# Patient Record
Sex: FEMALE | Race: WHITE | Employment: PART TIME | URBAN - METROPOLITAN AREA
[De-identification: names, ages, dates, MRNs, and addresses within clinical notes are randomized per-mention and may not be internally consistent; named-entity substitution may affect disease eponyms.]

---

## 2018-03-11 ENCOUNTER — HOSPITAL ENCOUNTER (EMERGENCY)
Age: 43
Discharge: HOME | End: 2018-03-11
Attending: EMERGENCY MEDICINE
Payer: MEDICAID

## 2018-03-11 VITALS
WEIGHT: 120 LBS | DIASTOLIC BLOOD PRESSURE: 62 MMHG | TEMPERATURE: 98.4 F | RESPIRATION RATE: 16 BRPM | OXYGEN SATURATION: 100 % | HEART RATE: 78 BPM | SYSTOLIC BLOOD PRESSURE: 120 MMHG | HEIGHT: 66 IN | BODY MASS INDEX: 19.29 KG/M2

## 2018-03-11 DIAGNOSIS — R10.2 CHRONIC PELVIC PAIN IN FEMALE: Primary | ICD-10-CM

## 2018-03-11 DIAGNOSIS — G89.29 CHRONIC PELVIC PAIN IN FEMALE: Primary | ICD-10-CM

## 2018-03-11 LAB
BACTERIA: ABNORMAL /HPF
BILIRUBIN URINE: NEGATIVE
BLOOD, URINE: NORMAL
CHP ED QC CHECK: NORMAL
CLARITY: NORMAL
COLOR: YELLOW
EPITHELIAL CELLS, UA: ABNORMAL /HPF
GLUCOSE URINE: NEGATIVE MG/DL
KETONES, URINE: NEGATIVE MG/DL
LEUKOCYTE ESTERASE, URINE: NEGATIVE
NITRITE, URINE: NEGATIVE
PH UA: 5 (ref 5–9)
PREGNANCY TEST URINE, POC: NEGATIVE
PROTEIN UA: NEGATIVE MG/DL
RBC UA: ABNORMAL /HPF (ref 0–2)
SPECIFIC GRAVITY UA: >=1.03 (ref 1–1.03)
UROBILINOGEN, URINE: 0.2 E.U./DL
WBC UA: ABNORMAL /HPF (ref 0–5)

## 2018-03-11 PROCEDURE — 96372 THER/PROPH/DIAG INJ SC/IM: CPT

## 2018-03-11 PROCEDURE — 81003 URINALYSIS AUTO W/O SCOPE: CPT

## 2018-03-11 PROCEDURE — 99283 EMERGENCY DEPT VISIT LOW MDM: CPT

## 2018-03-11 PROCEDURE — 81015 MICROSCOPIC EXAM OF URINE: CPT

## 2018-03-11 PROCEDURE — 6360000002 HC RX W HCPCS: Performed by: NURSE PRACTITIONER

## 2018-03-11 RX ORDER — KETOROLAC TROMETHAMINE 30 MG/ML
30 INJECTION, SOLUTION INTRAMUSCULAR; INTRAVENOUS ONCE
Status: COMPLETED | OUTPATIENT
Start: 2018-03-11 | End: 2018-03-11

## 2018-03-11 RX ORDER — NAPROXEN 500 MG/1
500 TABLET ORAL 2 TIMES DAILY
Qty: 14 TABLET | Refills: 0 | Status: SHIPPED | OUTPATIENT
Start: 2018-03-11 | End: 2019-06-24 | Stop reason: ALTCHOICE

## 2018-03-11 RX ORDER — NAPROXEN 250 MG/1
250 TABLET ORAL 2 TIMES DAILY PRN
COMMUNITY
End: 2018-03-11

## 2018-03-11 RX ADMIN — KETOROLAC TROMETHAMINE 30 MG: 30 INJECTION, SOLUTION INTRAMUSCULAR at 12:41

## 2018-03-11 ASSESSMENT — PAIN DESCRIPTION - ONSET: ONSET: ON-GOING

## 2018-03-11 ASSESSMENT — PAIN DESCRIPTION - FREQUENCY: FREQUENCY: CONTINUOUS

## 2018-03-11 ASSESSMENT — PAIN DESCRIPTION - LOCATION: LOCATION: PELVIS;BACK

## 2018-03-11 ASSESSMENT — PAIN SCALES - GENERAL
PAINLEVEL_OUTOF10: 9
PAINLEVEL_OUTOF10: 5
PAINLEVEL_OUTOF10: 9

## 2018-03-11 ASSESSMENT — PAIN DESCRIPTION - DESCRIPTORS: DESCRIPTORS: ACHING;THROBBING

## 2018-03-11 ASSESSMENT — PAIN DESCRIPTION - PROGRESSION: CLINICAL_PROGRESSION: GRADUALLY WORSENING

## 2018-03-11 ASSESSMENT — PAIN DESCRIPTION - PAIN TYPE: TYPE: ACUTE PAIN

## 2018-03-11 ASSESSMENT — PAIN DESCRIPTION - ORIENTATION: ORIENTATION: LOWER

## 2018-03-11 NOTE — ED PROVIDER NOTES
Procedure Laterality Date    BREAST SURGERY  2004    lumpectomy of the L    Social History:  reports that she has been smoking Cigarettes. She has a 4.00 pack-year smoking history. She has never used smokeless tobacco. She reports that she does not drink alcohol or use drugs. Family History: family history is not on file. Allergies: Patient has no known allergies. Physical Exam           ED Triage Vitals [03/11/18 1120]   BP Temp Temp Source Pulse Resp SpO2 Height Weight   121/62 98.4 °F (36.9 °C) Oral 78 16 100 % 5' 6\" (1.676 m) 120 lb (54.4 kg)      Oxygen Saturation Interpretation: Normal.    · General Appearance/Constitutional:  Alert, development consistent with age. · HEENT:  NC/NT. PERRLA. Airway patent. · Neck:  Supple. No lymphadenopathy. · Respiratory:  No retractions. Lungs Clear to auscultation and breath sounds equal.  · CV:  Regular rate and rhythm. · GI:  General Appearance: normal.         Bowel sounds: normal bowel sounds. Distension:  None. Tenderness: Mild bilateral pelvic tenderness. Liver: non-tender. Spleen:  non-palpable and non-tender. Pulsatile Mass: absent. Hernia:  no inguinal or femoral hernias noted. · Back: CVA Tenderness: No.  · : (chaperone present during examination). External Genitalia: General appearance; normal, Hair distribution; normal, Lesions absent. Urethral Meatus: Size normal, Location normal, Lesions absent, Prolapse absent. bilaterally\"}. Anus/Perineum:  normal.  · Integument:  Normal turgor. Warm, dry, without visible rash, unless noted elsewhere. · Lymphatics: No edema, cap.refill <3sec. · Neurological:  Orientation age-appropriate. Motor functions intact.     Lab / Imaging Results   (All laboratory and radiology results have been personally reviewed by myself)  Labs:  Results for orders placed or performed during the hospital encounter of home  Patient condition is good    New Medications     Discharge Medication List as of 3/11/2018  1:29 PM        Electronically signed by Sarah Galeana NP   DD: 3/11/18  **This report was transcribed using voice recognition software. Every effort was made to ensure accuracy; however, inadvertent computerized transcription errors may be present. END OF ED PROVIDER NOTE     Bharath Edmond NP  03/11/18 8251  ATTENDING PROVIDER ATTESTATION:     Sina Hunter presented to the emergency department for evaluation of Abdominal Pain (Pt recently diagnosed with endometriosis by OB; pt has constant pelvic and back pain that has recently changed and worsened since Friday; pt is concerned something may have ruptured; pt claims to have ovarian cysts and fibroid tumor) and Nausea    I have reviewed and discussed the case, including pertinent history (medical, surgical, family and social) and exam findings with the Midlevel and the Nurse assigned to Sina Hunter. I have personally performed and/or participated in the history, exam, medical decision making, and procedures and agree with all pertinent clinical information. I have reviewed my findings and recommendations with Sina Hunter and members of family present at the time of disposition. My findings/plan: The encounter diagnosis was Chronic pelvic pain in female.   Discharge Medication List as of 3/11/2018  1:58 PM        Joni López DO    Critical Care:  no       Joni López DO  03/11/18 1628

## 2018-03-21 ENCOUNTER — HOSPITAL ENCOUNTER (OUTPATIENT)
Age: 43
Discharge: HOME | End: 2018-03-23
Payer: MEDICAID

## 2018-03-21 ENCOUNTER — HOSPITAL ENCOUNTER (OUTPATIENT)
Dept: MAMMOGRAPHY | Age: 43
Discharge: HOME | End: 2018-03-23
Payer: MEDICAID

## 2018-03-21 DIAGNOSIS — Z12.31 VISIT FOR SCREENING MAMMOGRAM: ICD-10-CM

## 2018-03-21 PROCEDURE — 77067 SCR MAMMO BI INCL CAD: CPT

## 2018-03-21 PROCEDURE — 88305 TISSUE EXAM BY PATHOLOGIST: CPT

## 2018-05-16 ENCOUNTER — HOSPITAL ENCOUNTER (OUTPATIENT)
Age: 43
Discharge: HOME | End: 2018-05-18

## 2018-05-16 ENCOUNTER — HOSPITAL ENCOUNTER (OUTPATIENT)
Age: 43
Discharge: HOME | End: 2018-05-18
Payer: COMMERCIAL

## 2018-05-16 LAB
ANION GAP SERPL CALCULATED.3IONS-SCNC: 11 MMOL/L (ref 7–16)
BUN BLDV-MCNC: 12 MG/DL (ref 6–20)
CALCIUM SERPL-MCNC: 8.9 MG/DL (ref 8.6–10.2)
CHLORIDE BLD-SCNC: 103 MMOL/L (ref 98–107)
CO2: 26 MMOL/L (ref 22–29)
CREAT SERPL-MCNC: 0.5 MG/DL (ref 0.5–1)
GFR AFRICAN AMERICAN: >60
GFR NON-AFRICAN AMERICAN: >60 ML/MIN/1.73
GLUCOSE BLD-MCNC: 85 MG/DL (ref 74–109)
HCT VFR BLD CALC: 36.6 % (ref 34–48)
HEMOGLOBIN: 12 G/DL (ref 11.5–15.5)
MCH RBC QN AUTO: 31 PG (ref 26–35)
MCHC RBC AUTO-ENTMCNC: 32.8 % (ref 32–34.5)
MCV RBC AUTO: 94.6 FL (ref 80–99.9)
PDW BLD-RTO: 13.1 FL (ref 11.5–15)
PLATELET # BLD: 260 E9/L (ref 130–450)
PMV BLD AUTO: 9.9 FL (ref 7–12)
POTASSIUM SERPL-SCNC: 4 MMOL/L (ref 3.5–5)
RBC # BLD: 3.87 E12/L (ref 3.5–5.5)
SODIUM BLD-SCNC: 140 MMOL/L (ref 132–146)
WBC # BLD: 5 E9/L (ref 4.5–11.5)

## 2018-05-16 PROCEDURE — 85027 COMPLETE CBC AUTOMATED: CPT

## 2018-05-16 PROCEDURE — 87081 CULTURE SCREEN ONLY: CPT

## 2018-05-16 PROCEDURE — 80048 BASIC METABOLIC PNL TOTAL CA: CPT

## 2018-05-18 LAB — MRSA CULTURE ONLY: NORMAL

## 2018-05-29 ENCOUNTER — HOSPITAL ENCOUNTER (OUTPATIENT)
Age: 43
Discharge: HOME | End: 2018-05-31
Payer: COMMERCIAL

## 2018-05-29 ENCOUNTER — HOSPITAL ENCOUNTER (OUTPATIENT)
Age: 43
Discharge: HOME | End: 2018-05-31

## 2018-05-29 LAB
ABO/RH: NORMAL
ANTIBODY SCREEN: NORMAL

## 2018-05-29 PROCEDURE — 86900 BLOOD TYPING SEROLOGIC ABO: CPT

## 2018-05-29 PROCEDURE — 86850 RBC ANTIBODY SCREEN: CPT

## 2018-05-29 PROCEDURE — 88307 TISSUE EXAM BY PATHOLOGIST: CPT

## 2018-05-29 PROCEDURE — 86901 BLOOD TYPING SEROLOGIC RH(D): CPT

## 2018-05-30 ENCOUNTER — HOSPITAL ENCOUNTER (INPATIENT)
Age: 43
LOS: 1 days | Discharge: HOME | DRG: 254 | End: 2018-06-01
Attending: EMERGENCY MEDICINE | Admitting: SPECIALIST
Payer: COMMERCIAL

## 2018-05-30 ENCOUNTER — APPOINTMENT (OUTPATIENT)
Dept: CT IMAGING | Age: 43
DRG: 254 | End: 2018-05-30
Payer: COMMERCIAL

## 2018-05-30 ENCOUNTER — HOSPITAL ENCOUNTER (OUTPATIENT)
Age: 43
Discharge: HOME | End: 2018-06-01

## 2018-05-30 DIAGNOSIS — G89.18 POST-OPERATIVE PAIN: Primary | ICD-10-CM

## 2018-05-30 LAB
ALBUMIN SERPL-MCNC: 3.3 G/DL (ref 3.5–5.2)
ALP BLD-CCNC: 72 U/L (ref 35–104)
ALT SERPL-CCNC: 14 U/L (ref 0–32)
ANION GAP SERPL CALCULATED.3IONS-SCNC: 11 MMOL/L (ref 7–16)
AST SERPL-CCNC: 16 U/L (ref 0–31)
BACTERIA: NORMAL /HPF
BASOPHILS ABSOLUTE: 0.01 E9/L (ref 0–0.2)
BASOPHILS RELATIVE PERCENT: 0.1 % (ref 0–2)
BILIRUB SERPL-MCNC: 0.3 MG/DL (ref 0–1.2)
BILIRUBIN URINE: NEGATIVE
BLOOD, URINE: NORMAL
BUN BLDV-MCNC: 7 MG/DL (ref 6–20)
CALCIUM SERPL-MCNC: 8.5 MG/DL (ref 8.6–10.2)
CHLORIDE BLD-SCNC: 101 MMOL/L (ref 98–107)
CHP ED QC CHECK: NORMAL
CLARITY: CLEAR
CO2: 26 MMOL/L (ref 22–29)
COLOR: YELLOW
CREAT SERPL-MCNC: 0.5 MG/DL (ref 0.5–1)
EKG ATRIAL RATE: 84 BPM
EKG P AXIS: 78 DEGREES
EKG P-R INTERVAL: 122 MS
EKG Q-T INTERVAL: 374 MS
EKG QRS DURATION: 72 MS
EKG QTC CALCULATION (BAZETT): 441 MS
EKG R AXIS: 68 DEGREES
EKG T AXIS: 37 DEGREES
EKG VENTRICULAR RATE: 84 BPM
EOSINOPHILS ABSOLUTE: 0.05 E9/L (ref 0.05–0.5)
EOSINOPHILS RELATIVE PERCENT: 0.4 % (ref 0–6)
EPITHELIAL CELLS, UA: NORMAL /HPF
GFR AFRICAN AMERICAN: >60
GFR NON-AFRICAN AMERICAN: >60 ML/MIN/1.73
GLUCOSE BLD-MCNC: 122 MG/DL (ref 74–109)
GLUCOSE URINE: NEGATIVE MG/DL
HCT VFR BLD CALC: 29.4 % (ref 34–48)
HCT VFR BLD CALC: 29.8 % (ref 34–48)
HEMOGLOBIN: 9.8 G/DL (ref 11.5–15.5)
HEMOGLOBIN: 9.9 G/DL (ref 11.5–15.5)
IMMATURE GRANULOCYTES #: 0.04 E9/L
IMMATURE GRANULOCYTES %: 0.3 % (ref 0–5)
INR BLD: 2
KETONES, URINE: NEGATIVE MG/DL
LACTIC ACID: 2 MMOL/L (ref 0.5–2.2)
LEUKOCYTE ESTERASE, URINE: NEGATIVE
LYMPHOCYTES ABSOLUTE: 1.07 E9/L (ref 1.5–4)
LYMPHOCYTES RELATIVE PERCENT: 8.6 % (ref 20–42)
MCH RBC QN AUTO: 31.6 PG (ref 26–35)
MCHC RBC AUTO-ENTMCNC: 32.9 % (ref 32–34.5)
MCV RBC AUTO: 96.1 FL (ref 80–99.9)
MONOCYTES ABSOLUTE: 0.75 E9/L (ref 0.1–0.95)
MONOCYTES RELATIVE PERCENT: 6 % (ref 2–12)
NEUTROPHILS ABSOLUTE: 10.5 E9/L (ref 1.8–7.3)
NEUTROPHILS RELATIVE PERCENT: 84.6 % (ref 43–80)
NITRITE, URINE: NEGATIVE
PDW BLD-RTO: 13.2 FL (ref 11.5–15)
PH UA: 5.5 (ref 5–9)
PLATELET # BLD: 254 E9/L (ref 130–450)
PMV BLD AUTO: 10.3 FL (ref 7–12)
POTASSIUM REFLEX MAGNESIUM: 3.9 MMOL/L (ref 3.5–5)
PREGNANCY TEST URINE, POC: NEGATIVE
PROTEIN UA: NEGATIVE MG/DL
PROTHROMBIN TIME: 22.4 SEC (ref 9.3–12.4)
RBC # BLD: 3.1 E12/L (ref 3.5–5.5)
RBC UA: NORMAL /HPF (ref 0–2)
SODIUM BLD-SCNC: 138 MMOL/L (ref 132–146)
SPECIFIC GRAVITY UA: 1.02 (ref 1–1.03)
TOTAL PROTEIN: 5.7 G/DL (ref 6.4–8.3)
UROBILINOGEN, URINE: 0.2 E.U./DL
WBC # BLD: 12.4 E9/L (ref 4.5–11.5)
WBC UA: NORMAL /HPF (ref 0–5)

## 2018-05-30 PROCEDURE — 83605 ASSAY OF LACTIC ACID: CPT

## 2018-05-30 PROCEDURE — 36415 COLL VENOUS BLD VENIPUNCTURE: CPT

## 2018-05-30 PROCEDURE — 2580000003 HC RX 258: Performed by: EMERGENCY MEDICINE

## 2018-05-30 PROCEDURE — 74177 CT ABD & PELVIS W/CONTRAST: CPT

## 2018-05-30 PROCEDURE — 87088 URINE BACTERIA CULTURE: CPT

## 2018-05-30 PROCEDURE — 85730 THROMBOPLASTIN TIME PARTIAL: CPT

## 2018-05-30 PROCEDURE — 85018 HEMOGLOBIN: CPT

## 2018-05-30 PROCEDURE — 99285 EMERGENCY DEPT VISIT HI MDM: CPT

## 2018-05-30 PROCEDURE — 81001 URINALYSIS AUTO W/SCOPE: CPT

## 2018-05-30 PROCEDURE — 80053 COMPREHEN METABOLIC PANEL: CPT

## 2018-05-30 PROCEDURE — 93005 ELECTROCARDIOGRAM TRACING: CPT | Performed by: EMERGENCY MEDICINE

## 2018-05-30 PROCEDURE — 85610 PROTHROMBIN TIME: CPT

## 2018-05-30 PROCEDURE — 85025 COMPLETE CBC W/AUTO DIFF WBC: CPT

## 2018-05-30 PROCEDURE — 85014 HEMATOCRIT: CPT

## 2018-05-30 PROCEDURE — 6360000004 HC RX CONTRAST MEDICATION: Performed by: RADIOLOGY

## 2018-05-30 RX ORDER — 0.9 % SODIUM CHLORIDE 0.9 %
1000 INTRAVENOUS SOLUTION INTRAVENOUS ONCE
Status: COMPLETED | OUTPATIENT
Start: 2018-05-30 | End: 2018-05-30

## 2018-05-30 RX ADMIN — IOPAMIDOL 110 ML: 755 INJECTION, SOLUTION INTRAVENOUS at 23:50

## 2018-05-30 RX ADMIN — SODIUM CHLORIDE 1000 ML: 9 INJECTION, SOLUTION INTRAVENOUS at 22:35

## 2018-05-30 ASSESSMENT — PAIN SCALES - GENERAL: PAINLEVEL_OUTOF10: 8

## 2018-05-31 PROBLEM — I95.9 HYPOTENSION: Status: ACTIVE | Noted: 2018-05-31

## 2018-05-31 LAB
APTT: 25.2 SEC (ref 24.5–35.1)
APTT: >240 SEC (ref 24.5–35.1)
INR BLD: 1.1
PROTHROMBIN TIME: 12.9 SEC (ref 9.3–12.4)

## 2018-05-31 PROCEDURE — 85730 THROMBOPLASTIN TIME PARTIAL: CPT

## 2018-05-31 PROCEDURE — 96375 TX/PRO/DX INJ NEW DRUG ADDON: CPT

## 2018-05-31 PROCEDURE — 96374 THER/PROPH/DIAG INJ IV PUSH: CPT

## 2018-05-31 PROCEDURE — 6370000000 HC RX 637 (ALT 250 FOR IP): Performed by: SPECIALIST

## 2018-05-31 PROCEDURE — 85610 PROTHROMBIN TIME: CPT

## 2018-05-31 PROCEDURE — 6360000002 HC RX W HCPCS: Performed by: EMERGENCY MEDICINE

## 2018-05-31 PROCEDURE — 2580000003 HC RX 258: Performed by: EMERGENCY MEDICINE

## 2018-05-31 PROCEDURE — 36415 COLL VENOUS BLD VENIPUNCTURE: CPT

## 2018-05-31 PROCEDURE — 6370000000 HC RX 637 (ALT 250 FOR IP)

## 2018-05-31 PROCEDURE — 2060000000 HC ICU INTERMEDIATE R&B

## 2018-05-31 PROCEDURE — 2580000003 HC RX 258: Performed by: OBSTETRICS & GYNECOLOGY

## 2018-05-31 PROCEDURE — 6360000002 HC RX W HCPCS: Performed by: OBSTETRICS & GYNECOLOGY

## 2018-05-31 PROCEDURE — G0378 HOSPITAL OBSERVATION PER HR: HCPCS

## 2018-05-31 PROCEDURE — 6370000000 HC RX 637 (ALT 250 FOR IP): Performed by: SURGERY

## 2018-05-31 RX ORDER — FENTANYL CITRATE 50 UG/ML
50 INJECTION, SOLUTION INTRAMUSCULAR; INTRAVENOUS ONCE
Status: COMPLETED | OUTPATIENT
Start: 2018-05-31 | End: 2018-05-31

## 2018-05-31 RX ORDER — SODIUM PHOSPHATE, DIBASIC AND SODIUM PHOSPHATE, MONOBASIC 7; 19 G/133ML; G/133ML
1 ENEMA RECTAL
Status: DISCONTINUED | OUTPATIENT
Start: 2018-05-31 | End: 2018-05-31

## 2018-05-31 RX ORDER — 0.9 % SODIUM CHLORIDE 0.9 %
1000 INTRAVENOUS SOLUTION INTRAVENOUS ONCE
Status: COMPLETED | OUTPATIENT
Start: 2018-05-31 | End: 2018-05-31

## 2018-05-31 RX ORDER — DIMETHICONE, OXYBENZONE, AND PADIMATE O 2; 2.5; 6.6 G/100G; G/100G; G/100G
STICK TOPICAL PRN
Status: DISCONTINUED | OUTPATIENT
Start: 2018-05-31 | End: 2018-06-01 | Stop reason: HOSPADM

## 2018-05-31 RX ORDER — DEXTROSE, SODIUM CHLORIDE, SODIUM LACTATE, POTASSIUM CHLORIDE, AND CALCIUM CHLORIDE 5; .6; .31; .03; .02 G/100ML; G/100ML; G/100ML; G/100ML; G/100ML
INJECTION, SOLUTION INTRAVENOUS CONTINUOUS
Status: DISCONTINUED | OUTPATIENT
Start: 2018-05-31 | End: 2018-06-01

## 2018-05-31 RX ORDER — SODIUM CHLORIDE 0.9 % (FLUSH) 0.9 %
10 SYRINGE (ML) INJECTION PRN
Status: DISCONTINUED | OUTPATIENT
Start: 2018-05-31 | End: 2018-06-01 | Stop reason: HOSPADM

## 2018-05-31 RX ORDER — SODIUM CHLORIDE 0.9 % (FLUSH) 0.9 %
10 SYRINGE (ML) INJECTION EVERY 12 HOURS SCHEDULED
Status: DISCONTINUED | OUTPATIENT
Start: 2018-05-31 | End: 2018-06-01 | Stop reason: HOSPADM

## 2018-05-31 RX ORDER — SODIUM PHOSPHATE, DIBASIC AND SODIUM PHOSPHATE, MONOBASIC 7; 19 G/133ML; G/133ML
1 ENEMA RECTAL
Status: COMPLETED | OUTPATIENT
Start: 2018-05-31 | End: 2018-05-31

## 2018-05-31 RX ORDER — ONDANSETRON 2 MG/ML
4 INJECTION INTRAMUSCULAR; INTRAVENOUS EVERY 6 HOURS PRN
Status: DISCONTINUED | OUTPATIENT
Start: 2018-05-31 | End: 2018-06-01 | Stop reason: HOSPADM

## 2018-05-31 RX ORDER — ACETAMINOPHEN 325 MG/1
650 TABLET ORAL EVERY 4 HOURS PRN
Status: DISCONTINUED | OUTPATIENT
Start: 2018-05-31 | End: 2018-06-01 | Stop reason: HOSPADM

## 2018-05-31 RX ORDER — DIMETHICONE, OXYBENZONE, AND PADIMATE O 2; 2.5; 6.6 G/100G; G/100G; G/100G
STICK TOPICAL
Status: COMPLETED
Start: 2018-05-31 | End: 2018-05-31

## 2018-05-31 RX ADMIN — HYDROMORPHONE HYDROCHLORIDE 0.5 MG: 1 INJECTION, SOLUTION INTRAMUSCULAR; INTRAVENOUS; SUBCUTANEOUS at 08:08

## 2018-05-31 RX ADMIN — POLYETHYLENE GLYCOL-3350 AND ELECTROLYTES 4000 ML: 236; 6.74; 5.86; 2.97; 22.74 POWDER, FOR SOLUTION ORAL at 20:05

## 2018-05-31 RX ADMIN — Medication: at 08:15

## 2018-05-31 RX ADMIN — SODIUM CHLORIDE, SODIUM LACTATE, POTASSIUM CHLORIDE, CALCIUM CHLORIDE AND DEXTROSE MONOHYDRATE: 5; 600; 310; 30; 20 INJECTION, SOLUTION INTRAVENOUS at 20:08

## 2018-05-31 RX ADMIN — SODIUM CHLORIDE, SODIUM LACTATE, POTASSIUM CHLORIDE, CALCIUM CHLORIDE AND DEXTROSE MONOHYDRATE: 5; 600; 310; 30; 20 INJECTION, SOLUTION INTRAVENOUS at 06:13

## 2018-05-31 RX ADMIN — MAGESIUM CITRATE 296 ML: 1.75 LIQUID ORAL at 12:36

## 2018-05-31 RX ADMIN — ACETAMINOPHEN 650 MG: 325 TABLET ORAL at 12:35

## 2018-05-31 RX ADMIN — SODIUM CHLORIDE, SODIUM LACTATE, POTASSIUM CHLORIDE, CALCIUM CHLORIDE AND DEXTROSE MONOHYDRATE: 5; 600; 310; 30; 20 INJECTION, SOLUTION INTRAVENOUS at 13:57

## 2018-05-31 RX ADMIN — ONDANSETRON 4 MG: 2 INJECTION INTRAMUSCULAR; INTRAVENOUS at 08:09

## 2018-05-31 RX ADMIN — SODIUM CHLORIDE 1000 ML: 9 INJECTION, SOLUTION INTRAVENOUS at 00:28

## 2018-05-31 RX ADMIN — SODIUM PHOSPHATE 1 ENEMA: 7; 19 ENEMA RECTAL at 17:01

## 2018-05-31 RX ADMIN — ONDANSETRON 4 MG: 2 INJECTION INTRAMUSCULAR; INTRAVENOUS at 00:28

## 2018-05-31 RX ADMIN — MAGESIUM CITRATE 296 ML: 1.75 LIQUID ORAL at 17:02

## 2018-05-31 RX ADMIN — ACETAMINOPHEN 650 MG: 325 TABLET ORAL at 20:15

## 2018-05-31 RX ADMIN — FENTANYL CITRATE 50 MCG: 50 INJECTION, SOLUTION INTRAMUSCULAR; INTRAVENOUS at 00:28

## 2018-05-31 ASSESSMENT — PAIN DESCRIPTION - ORIENTATION
ORIENTATION: MID

## 2018-05-31 ASSESSMENT — PAIN DESCRIPTION - LOCATION
LOCATION: HEAD
LOCATION: ABDOMEN;HEAD

## 2018-05-31 ASSESSMENT — PAIN SCALES - GENERAL
PAINLEVEL_OUTOF10: 9
PAINLEVEL_OUTOF10: 8
PAINLEVEL_OUTOF10: 9
PAINLEVEL_OUTOF10: 5
PAINLEVEL_OUTOF10: 9
PAINLEVEL_OUTOF10: 7
PAINLEVEL_OUTOF10: 6
PAINLEVEL_OUTOF10: 9
PAINLEVEL_OUTOF10: 0

## 2018-05-31 ASSESSMENT — PAIN DESCRIPTION - FREQUENCY
FREQUENCY: CONTINUOUS

## 2018-05-31 ASSESSMENT — PAIN DESCRIPTION - PAIN TYPE
TYPE: ACUTE PAIN

## 2018-05-31 ASSESSMENT — PAIN DESCRIPTION - ONSET
ONSET: PROGRESSIVE
ONSET: ON-GOING
ONSET: ON-GOING

## 2018-05-31 ASSESSMENT — PAIN DESCRIPTION - PROGRESSION
CLINICAL_PROGRESSION: GRADUALLY WORSENING
CLINICAL_PROGRESSION: GRADUALLY IMPROVING
CLINICAL_PROGRESSION: GRADUALLY WORSENING
CLINICAL_PROGRESSION: NOT CHANGED

## 2018-05-31 ASSESSMENT — PAIN DESCRIPTION - DESCRIPTORS
DESCRIPTORS: ACHING;DISCOMFORT;DULL;HEADACHE
DESCRIPTORS: ACHING;DISCOMFORT;HEADACHE;DULL

## 2018-06-01 VITALS
WEIGHT: 130.6 LBS | DIASTOLIC BLOOD PRESSURE: 53 MMHG | BODY MASS INDEX: 20.99 KG/M2 | SYSTOLIC BLOOD PRESSURE: 118 MMHG | HEIGHT: 66 IN | OXYGEN SATURATION: 99 % | RESPIRATION RATE: 18 BRPM | TEMPERATURE: 97.7 F | HEART RATE: 82 BPM

## 2018-06-01 PROCEDURE — 96376 TX/PRO/DX INJ SAME DRUG ADON: CPT

## 2018-06-01 PROCEDURE — 6370000000 HC RX 637 (ALT 250 FOR IP): Performed by: SPECIALIST

## 2018-06-01 PROCEDURE — G0378 HOSPITAL OBSERVATION PER HR: HCPCS

## 2018-06-01 PROCEDURE — 6360000002 HC RX W HCPCS: Performed by: OBSTETRICS & GYNECOLOGY

## 2018-06-01 PROCEDURE — 2580000003 HC RX 258: Performed by: OBSTETRICS & GYNECOLOGY

## 2018-06-01 RX ORDER — BISACODYL 10 MG
10 SUPPOSITORY, RECTAL RECTAL DAILY PRN
Status: DISCONTINUED | OUTPATIENT
Start: 2018-06-01 | End: 2018-06-01 | Stop reason: HOSPADM

## 2018-06-01 RX ORDER — BISACODYL 10 MG
10 SUPPOSITORY, RECTAL RECTAL DAILY
Status: DISCONTINUED | OUTPATIENT
Start: 2018-06-01 | End: 2018-06-01

## 2018-06-01 RX ORDER — BISACODYL 10 MG
10 SUPPOSITORY, RECTAL RECTAL DAILY PRN
Status: DISCONTINUED | OUTPATIENT
Start: 2018-06-01 | End: 2018-06-01

## 2018-06-01 RX ORDER — POLYETHYLENE GLYCOL 3350 17 G/17G
17 POWDER, FOR SOLUTION ORAL DAILY
Status: DISCONTINUED | OUTPATIENT
Start: 2018-06-01 | End: 2018-06-01 | Stop reason: HOSPADM

## 2018-06-01 RX ORDER — SENNA AND DOCUSATE SODIUM 50; 8.6 MG/1; MG/1
1 TABLET, FILM COATED ORAL EVERY EVENING
Status: DISCONTINUED | OUTPATIENT
Start: 2018-06-01 | End: 2018-06-01 | Stop reason: HOSPADM

## 2018-06-01 RX ORDER — DOCUSATE SODIUM 100 MG/1
100 CAPSULE, LIQUID FILLED ORAL 2 TIMES DAILY
Status: DISCONTINUED | OUTPATIENT
Start: 2018-06-01 | End: 2018-06-01 | Stop reason: HOSPADM

## 2018-06-01 RX ADMIN — ACETAMINOPHEN 650 MG: 325 TABLET ORAL at 04:20

## 2018-06-01 RX ADMIN — SODIUM CHLORIDE, SODIUM LACTATE, POTASSIUM CHLORIDE, CALCIUM CHLORIDE AND DEXTROSE MONOHYDRATE: 5; 600; 310; 30; 20 INJECTION, SOLUTION INTRAVENOUS at 04:21

## 2018-06-01 RX ADMIN — HYDROMORPHONE HYDROCHLORIDE 0.5 MG: 1 INJECTION, SOLUTION INTRAMUSCULAR; INTRAVENOUS; SUBCUTANEOUS at 09:48

## 2018-06-01 ASSESSMENT — PAIN SCALES - GENERAL
PAINLEVEL_OUTOF10: 8
PAINLEVEL_OUTOF10: 8
PAINLEVEL_OUTOF10: 7

## 2018-06-01 ASSESSMENT — PAIN DESCRIPTION - PROGRESSION
CLINICAL_PROGRESSION: NOT CHANGED
CLINICAL_PROGRESSION: GRADUALLY WORSENING
CLINICAL_PROGRESSION: NOT CHANGED

## 2018-06-01 ASSESSMENT — PAIN DESCRIPTION - PAIN TYPE
TYPE: SURGICAL PAIN
TYPE: ACUTE PAIN
TYPE: SURGICAL PAIN

## 2018-06-01 ASSESSMENT — PAIN DESCRIPTION - FREQUENCY
FREQUENCY: CONTINUOUS

## 2018-06-01 ASSESSMENT — PAIN DESCRIPTION - ONSET
ONSET: ON-GOING

## 2018-06-01 ASSESSMENT — PAIN DESCRIPTION - ORIENTATION
ORIENTATION: MID
ORIENTATION: LOWER;MID
ORIENTATION: LOWER;MID

## 2018-06-01 ASSESSMENT — PAIN DESCRIPTION - DESCRIPTORS
DESCRIPTORS: ACHING;DISCOMFORT;DULL;PENETRATING
DESCRIPTORS: ACHING;DISCOMFORT;HEADACHE;DULL
DESCRIPTORS: ACHING;DISCOMFORT;DULL

## 2018-06-01 ASSESSMENT — PAIN DESCRIPTION - LOCATION
LOCATION: ABDOMEN
LOCATION: HEAD
LOCATION: ABDOMEN

## 2018-06-02 LAB — URINE CULTURE, ROUTINE: NORMAL

## 2019-06-24 ENCOUNTER — OFFICE VISIT (OUTPATIENT)
Dept: PRIMARY CARE CLINIC | Age: 44
End: 2019-06-24
Payer: COMMERCIAL

## 2019-06-24 VITALS
WEIGHT: 115.9 LBS | HEIGHT: 66 IN | HEART RATE: 76 BPM | OXYGEN SATURATION: 98 % | DIASTOLIC BLOOD PRESSURE: 68 MMHG | BODY MASS INDEX: 18.63 KG/M2 | SYSTOLIC BLOOD PRESSURE: 102 MMHG | RESPIRATION RATE: 16 BRPM

## 2019-06-24 DIAGNOSIS — L30.9 ECZEMA, UNSPECIFIED TYPE: ICD-10-CM

## 2019-06-24 DIAGNOSIS — F12.90 MARIJUANA USE: ICD-10-CM

## 2019-06-24 DIAGNOSIS — G44.029 CHRONIC CLUSTER HEADACHE, NOT INTRACTABLE: ICD-10-CM

## 2019-06-24 DIAGNOSIS — R63.0 POOR APPETITE: ICD-10-CM

## 2019-06-24 DIAGNOSIS — K59.00 CONSTIPATION, UNSPECIFIED CONSTIPATION TYPE: Primary | ICD-10-CM

## 2019-06-24 PROCEDURE — G8420 CALC BMI NORM PARAMETERS: HCPCS | Performed by: NURSE PRACTITIONER

## 2019-06-24 PROCEDURE — 4004F PT TOBACCO SCREEN RCVD TLK: CPT | Performed by: NURSE PRACTITIONER

## 2019-06-24 PROCEDURE — G8427 DOCREV CUR MEDS BY ELIG CLIN: HCPCS | Performed by: NURSE PRACTITIONER

## 2019-06-24 PROCEDURE — 99204 OFFICE O/P NEW MOD 45 MIN: CPT | Performed by: NURSE PRACTITIONER

## 2019-06-24 RX ORDER — MIRTAZAPINE 7.5 MG/1
7.5 TABLET, FILM COATED ORAL NIGHTLY
Qty: 30 TABLET | Refills: 5 | Status: SHIPPED | OUTPATIENT
Start: 2019-06-24 | End: 2019-10-28

## 2019-06-24 RX ORDER — BUTALBITAL, ACETAMINOPHEN AND CAFFEINE 50; 325; 40 MG/1; MG/1; MG/1
1 TABLET ORAL EVERY 6 HOURS PRN
Qty: 60 TABLET | Refills: 1 | Status: SHIPPED | OUTPATIENT
Start: 2019-06-24 | End: 2019-08-26 | Stop reason: SDUPTHER

## 2019-06-24 RX ORDER — BISACODYL 10 MG
10 SUPPOSITORY, RECTAL RECTAL PRN
Qty: 10 SUPPOSITORY | Refills: 2 | Status: SHIPPED | OUTPATIENT
Start: 2019-06-24 | End: 2019-07-24

## 2019-06-24 ASSESSMENT — PATIENT HEALTH QUESTIONNAIRE - PHQ9
SUM OF ALL RESPONSES TO PHQ QUESTIONS 1-9: 0
SUM OF ALL RESPONSES TO PHQ QUESTIONS 1-9: 0
1. LITTLE INTEREST OR PLEASURE IN DOING THINGS: 0
2. FEELING DOWN, DEPRESSED OR HOPELESS: 0
SUM OF ALL RESPONSES TO PHQ9 QUESTIONS 1 & 2: 0

## 2019-06-24 ASSESSMENT — ENCOUNTER SYMPTOMS
PHOTOPHOBIA: 0
CONSTIPATION: 1
BACK PAIN: 1
DIARRHEA: 0
SHORTNESS OF BREATH: 0
SINUS PRESSURE: 0
NAUSEA: 0
VOMITING: 0
COUGH: 0

## 2019-06-24 NOTE — PROGRESS NOTES
[address] [address] PRIMARY CARE  [address] [facility] [place] [address]  Dept: [phone]    Elise Rowland is a 53 y.o. female who presents today as an new patient for her medical conditionsas noted below. Chief Complaint   Patient presents with    New Patient     Patient is here to get established, headaches, sleep issues and constipation.  Other     Patient has a rash on her left elbow that comes and goes usually will come out when she is stressed. She has tried Hydrocortisone cream and OTC cream.        HPI:     HPI  Headaches - everyday, last most of the day, Takes sinus pill and it helps but they come back, Not effected by weather, rates at  8/10 on bad days, not effected by light or sound, no nausea, states she has had them for years but getting worse,, stress makes them worse    Eczema- left elbow, OTC hydrocortisone helps a little. Constipation - once per week, sometimes more, makes back hurt, she has tried stool softness and suppositories. Problem all her life.     Does not sleep through the night  Marijuana use daily  No results found for: LDLCHOLESTEROL, LDLCALC    (goal LDL is <100)   AST (U/L)   Date Value   05/30/2018 16     ALT (U/L)   Date Value   05/30/2018 14     BUN (mg/dL)   Date Value   05/30/2018 7     BP Readings from Last 3 Encounters:   06/24/19 102/68   06/01/18 (!) 118/53   03/11/18 120/62          (goal 120/80)    Past Medical History:   Diagnosis Date    Endometriosis     Fibroid tumor 2017    Ovarian cyst       Past Surgical History:   Procedure Laterality Date    BREAST SURGERY  2004    lumpectomy of the L     HYSTERECTOMY      cervix was taken       Family History   Problem Relation Age of Onset    Colon Cancer Paternal Grandfather        Social History     Tobacco Use    Smoking status: Current Every Day Smoker     Packs/day: 0.50     Years: 8.00     Pack years: 4.00     Types: Cigarettes    Smokeless tobacco: Never Used   Substance Use Topics    Alcohol use: No     Comment: social      Current Outpatient Medications   Medication Sig Dispense Refill    triamcinolone (KENALOG) 0.1 % ointment Apply topically 2 times daily 1 Tube 3    bisacodyl (BISAC-EVAC) 10 MG suppository Place 1 suppository rectally as needed for Constipation 10 suppository 2    butalbital-acetaminophen-caffeine (FIORICET, ESGIC) -40 MG per tablet Take 1 tablet by mouth every 6 hours as needed for Headaches 60 tablet 1    mirtazapine (REMERON) 7.5 MG tablet Take 1 tablet by mouth nightly 30 tablet 5     No current facility-administered medications for this visit. Allergies   Allergen Reactions    Diphenhydramine Itching       Health Maintenance   Topic Date Due    Pneumococcal 0-64 years Vaccine (1 of 1 - PPSV23) 03/27/1979    HIV screen  03/27/1988    DTaP/Tdap/Td vaccine (1 - Tdap) 03/27/1992    Cervical cancer screen  03/27/1994    Lipid screen  03/27/2013    Flu vaccine (Season Ended) 09/01/2019       Subjective:     Review of Systems   Constitutional: Positive for fatigue. Negative for chills and fever. Poor appetite   HENT: Negative for congestion, ear pain, nosebleeds and sinus pressure. Eyes: Negative for photophobia and visual disturbance. Respiratory: Negative for cough and shortness of breath. Cardiovascular: Negative for chest pain, palpitations and leg swelling. Gastrointestinal: Positive for constipation. Negative for diarrhea, nausea and vomiting. Genitourinary: Negative for difficulty urinating and dysuria. Musculoskeletal: Positive for back pain. Negative for arthralgias. Skin: Positive for rash. Negative for wound. Neurological: Negative for dizziness, light-headedness and numbness. Psychiatric/Behavioral: Positive for sleep disturbance. Negative for dysphoric mood. The patient is not nervous/anxious.         Objective:     /68   Pulse 76   Resp 16   Ht 5' 6\" (1.676 m) Wt 115 lb 14.4 oz (52.6 kg)   LMP 02/26/2018   SpO2 98%   BMI 18.71 kg/m²   Physical Exam   Constitutional: She is oriented to person, place, and time. She appears well-developed and well-nourished. HENT:   Head: Normocephalic and atraumatic. Right Ear: External ear normal.   Left Ear: External ear normal.   Eyes: Pupils are equal, round, and reactive to light. EOM are normal.   Neck: Normal range of motion. Neck supple. No thyromegaly present. Cardiovascular: Normal rate, regular rhythm and normal heart sounds. No murmur heard. Pulses:       Dorsalis pedis pulses are 2+ on the right side, and 2+ on the left side. Posterior tibial pulses are 1+ on the right side, and 1+ on the left side. No carotid bruit   Pulmonary/Chest: Effort normal and breath sounds normal. No respiratory distress. Abdominal: Soft. Bowel sounds are normal. There is no tenderness. Musculoskeletal: Normal range of motion. She exhibits no edema. Feet:   Right Foot:   Skin Integrity: Negative for skin breakdown. Left Foot:   Skin Integrity: Negative for skin breakdown. Neurological: She is alert and oriented to person, place, and time. Skin: Skin is warm and dry. Capillary refill takes less than 2 seconds. Left elbow dry, erythema, flaking skin   Psychiatric: She has a normal mood and affect. Her behavior is normal. Thought content normal.   Nursing note and vitals reviewed. Assessment:       Diagnosis Orders   1. Constipation, unspecified constipation type  bisacodyl (BISAC-EVAC) 10 MG suppository   2. Eczema, unspecified type  triamcinolone (KENALOG) 0.1 % ointment   3. Chronic cluster headache, not intractable  butalbital-acetaminophen-caffeine (FIORICET, ESGIC) -40 MG per tablet   4. Poor appetite  mirtazapine (REMERON) 7.5 MG tablet   5.  Marijuana use          Plan:    Fioricet  Bisacodyl suppository  Triamcinolone ointment  Mirtazapine   Return in about 2 months (around 8/24/2019) for med check. No orders of the defined types were placed in this encounter. Orders Placed This Encounter   Medications    triamcinolone (KENALOG) 0.1 % ointment     Sig: Apply topically 2 times daily     Dispense:  1 Tube     Refill:  3    bisacodyl (BISAC-EVAC) 10 MG suppository     Sig: Place 1 suppository rectally as needed for Constipation     Dispense:  10 suppository     Refill:  2    butalbital-acetaminophen-caffeine (FIORICET, ESGIC) -40 MG per tablet     Sig: Take 1 tablet by mouth every 6 hours as needed for Headaches     Dispense:  60 tablet     Refill:  1    mirtazapine (REMERON) 7.5 MG tablet     Sig: Take 1 tablet by mouth nightly     Dispense:  30 tablet     Refill:  5       Patient given educationalmaterials - see patient instructions. Discussed use, benefit, and side effectsof prescribed medications. All patient questions answered. Pt voiced understanding. Reviewed health maintenance. Instructed to continue current medications, diet andexercise. Patient agreed with treatment plan. Follow up as directed.      Electronicallysigned by ANITA Castañeda CNP on 6/24/2019 at 10:39 PM

## 2019-06-24 NOTE — PATIENT INSTRUCTIONS
Patient Education        Dermatitis: Care Instructions  Your Care Instructions  Dermatitis is the general name used for any rash or inflammation of the skin. Different kinds of dermatitis cause different kinds of rashes. Common causes of a rash include new medicines, plants (such as poison oak or poison ivy), heat, and stress. Certain illnesses can also cause a rash. An allergic reaction to something that touches your skin, such as latex, nickel, or poison ivy, is called contact dermatitis. Contact dermatitis may also be caused by something that irritates the skin, such as bleach, a chemical, or soap. These types of rashes cannot be spread from person to person. How long your rash will last depends on what caused it. Rashes may last a few days or months. Follow-up care is a key part of your treatment and safety. Be sure to make and go to all appointments, and call your doctor if you are having problems. It's also a good idea to know your test results and keep a list of the medicines you take. How can you care for yourself at home? · Do not scratch the rash. Cut your nails short, and file them smooth. Or wear gloves if this helps keep you from scratching. · Wash the area with water only. Pat dry. · Put cold, wet cloths on the rash to reduce itching. · Keep cool, and stay out of the sun. · Leave the rash open to the air as much as possible. · If the rash itches, use hydrocortisone cream. Follow the directions on the label. Calamine lotion may help for plant rashes. · Take an over-the-counter antihistamine, such as diphenhydramine (Benadryl) or loratadine (Claritin), to help calm the itching. Read and follow all instructions on the label. · If your doctor prescribed a cream, use it as directed. If your doctor prescribed medicine, take it exactly as directed. When should you call for help?   Call your doctor now or seek immediate medical care if:    · You have symptoms of infection, such as:  ? Increased pain, swelling, warmth, or redness. ? Red streaks leading from the area. ? Pus draining from the area. ? A fever.     · You have joint pain along with the rash.    Watch closely for changes in your health, and be sure to contact your doctor if:    · Your rash is changing or getting worse.     · You are not getting better as expected. Where can you learn more? Go to https://[employer]. org and sign in to your [employer] account. Enter ([phone] in the [facility] box to learn more about \"Dermatitis: Care Instructions. \"     If you do not have an account, please click on the \"Sign Up Now\" link. Current as of: April 17, 2018  Content Version: 12.0  © 6401-6206 [employer]wise, Incorporated. Care instructions adapted under license by [facility] ([facility]). If you have questions about a medical condition or this instruction, always ask your healthcare professional. [place] [address] any warranty or liability for your use of this information.

## 2019-08-26 ENCOUNTER — TELEPHONE (OUTPATIENT)
Dept: PRIMARY CARE CLINIC | Age: 44
End: 2019-08-26

## 2019-08-26 ENCOUNTER — OFFICE VISIT (OUTPATIENT)
Dept: PRIMARY CARE CLINIC | Age: 44
End: 2019-08-26
Payer: COMMERCIAL

## 2019-08-26 VITALS
RESPIRATION RATE: 16 BRPM | OXYGEN SATURATION: 98 % | BODY MASS INDEX: 17.15 KG/M2 | HEIGHT: 66 IN | DIASTOLIC BLOOD PRESSURE: 88 MMHG | SYSTOLIC BLOOD PRESSURE: 128 MMHG | WEIGHT: 106.7 LBS | HEART RATE: 69 BPM

## 2019-08-26 DIAGNOSIS — Z79.899 MEDICATION MANAGEMENT: ICD-10-CM

## 2019-08-26 DIAGNOSIS — R11.0 NAUSEA: ICD-10-CM

## 2019-08-26 DIAGNOSIS — G44.029 CHRONIC CLUSTER HEADACHE, NOT INTRACTABLE: ICD-10-CM

## 2019-08-26 DIAGNOSIS — F41.9 ANXIETY: ICD-10-CM

## 2019-08-26 DIAGNOSIS — R63.4 WEIGHT LOSS: ICD-10-CM

## 2019-08-26 DIAGNOSIS — Z79.899 HIGH RISK MEDICATION USE: ICD-10-CM

## 2019-08-26 DIAGNOSIS — R63.0 POOR APPETITE: Primary | ICD-10-CM

## 2019-08-26 DIAGNOSIS — G89.29 CHRONIC MIDLINE LOW BACK PAIN WITHOUT SCIATICA: ICD-10-CM

## 2019-08-26 DIAGNOSIS — M54.50 CHRONIC MIDLINE LOW BACK PAIN WITHOUT SCIATICA: ICD-10-CM

## 2019-08-26 LAB
AMPHETAMINE SCREEN, URINE: NEGATIVE
BARBITURATE SCREEN, URINE: NEGATIVE
BENZODIAZEPINE SCREEN, URINE: POSITIVE
BUPRENORPHINE URINE: ABNORMAL
COCAINE METABOLITE SCREEN URINE: NEGATIVE
GABAPENTIN SCREEN, URINE: ABNORMAL
MDMA URINE: NEGATIVE
METHADONE SCREEN, URINE: NEGATIVE
METHAMPHETAMINE, URINE: NEGATIVE
OPIATE SCREEN URINE: NEGATIVE
OXYCODONE SCREEN URINE: NEGATIVE
PHENCYCLIDINE SCREEN URINE: NEGATIVE
PROPOXYPHENE SCREEN, URINE: ABNORMAL
THC SCREEN, URINE: POSITIVE
TRICYCLIC ANTIDEPRESSANTS, UR: NEGATIVE

## 2019-08-26 PROCEDURE — G8427 DOCREV CUR MEDS BY ELIG CLIN: HCPCS | Performed by: NURSE PRACTITIONER

## 2019-08-26 PROCEDURE — 80305 DRUG TEST PRSMV DIR OPT OBS: CPT | Performed by: NURSE PRACTITIONER

## 2019-08-26 PROCEDURE — 99214 OFFICE O/P EST MOD 30 MIN: CPT | Performed by: NURSE PRACTITIONER

## 2019-08-26 PROCEDURE — G8419 CALC BMI OUT NRM PARAM NOF/U: HCPCS | Performed by: NURSE PRACTITIONER

## 2019-08-26 PROCEDURE — 4004F PT TOBACCO SCREEN RCVD TLK: CPT | Performed by: NURSE PRACTITIONER

## 2019-08-26 RX ORDER — ALPRAZOLAM 0.25 MG/1
0.25 TABLET ORAL 3 TIMES DAILY PRN
Qty: 60 TABLET | Refills: 0 | Status: SHIPPED | OUTPATIENT
Start: 2019-08-26 | End: 2019-09-30 | Stop reason: SDUPTHER

## 2019-08-26 RX ORDER — BUTALBITAL, ACETAMINOPHEN AND CAFFEINE 50; 325; 40 MG/1; MG/1; MG/1
1 TABLET ORAL EVERY 6 HOURS PRN
Qty: 60 TABLET | Refills: 1 | Status: SHIPPED | OUTPATIENT
Start: 2019-08-26 | End: 2019-10-28 | Stop reason: SDUPTHER

## 2019-08-26 RX ORDER — IBUPROFEN 800 MG/1
800 TABLET ORAL
Qty: 90 TABLET | Refills: 1 | Status: SHIPPED | OUTPATIENT
Start: 2019-08-26 | End: 2020-03-10 | Stop reason: SDUPTHER

## 2019-08-26 RX ORDER — MEGESTROL ACETATE 40 MG/ML
400 SUSPENSION ORAL DAILY
Qty: 300 ML | Refills: 2 | Status: SHIPPED | OUTPATIENT
Start: 2019-08-26 | End: 2019-12-30

## 2019-08-26 RX ORDER — LACTOSE-REDUCED FOOD
1 LIQUID (ML) ORAL 2 TIMES DAILY
Qty: 36 CAN | Refills: 3 | Status: SHIPPED | OUTPATIENT
Start: 2019-08-26 | End: 2019-10-28

## 2019-08-26 RX ORDER — ONDANSETRON 4 MG/1
4 TABLET, FILM COATED ORAL 3 TIMES DAILY PRN
Qty: 15 TABLET | Refills: 0 | Status: SHIPPED | OUTPATIENT
Start: 2019-08-26 | End: 2019-09-25

## 2019-08-26 ASSESSMENT — ENCOUNTER SYMPTOMS
BACK PAIN: 1
EYE DISCHARGE: 0
EYE ITCHING: 0
DIARRHEA: 0
NAUSEA: 0
COUGH: 0
SHORTNESS OF BREATH: 0
CONSTIPATION: 0
EYE PAIN: 0

## 2019-08-26 NOTE — PROGRESS NOTES
[address] [address] PRIMARY CARE  [address]  Dept: [phone]    Kylie Tripp is a 53 y.o. female who presents today for her medical conditions/complaintsas noted below. Chief Complaint   Patient presents with    Medication Check     Patient is here for a med check. She is having a very hard time with sleeping and eating, since last Monday she has not been able to sleep or eat, states  is still addicted to crack and alcohol. She does not want any antidepressant pills the last time she was on one she said it messed with her head. HPI:     HPI   is still using crack - he is using about every two weeks. She can not eat or sleep. She has lost 9 lbs since last visit.  has used 2x since last visit. He was clean for 56 days but has messed up 3x since out of rehab. She states televisions and air conditioner are out of the house. She does not know what he did with them. He is drinking alcohol - There was a hidden bottle of Vodka at home. Last Monday - Friday she only ate a box of pop tarts. She is not smoking pot. No bad thoughts of hurting self or others. Daughter is 24 & local and one daughter out of town. She ate a little bit with her daughter  She took two melatonin last night and it did not work. She is getting 2-3 hours per night. She does not want any antidepressants because that last one she took it made crazy. Two weeks ago she had a fever and she was at home. And  went out and smoked crack. He has brought drugs into the house before. He hides alcohol bottles in house. She never took mirtazapine - her  hid it someplace due to fear of side effects. Headaches continue, back pain, neck hurts. Back hurts everyday - it comes and goes. She has thrown out back tieing show. Ibuprofen helps. She is staying at her dad's with her dog.     No results found for:

## 2019-09-03 ENCOUNTER — TELEPHONE (OUTPATIENT)
Dept: PRIMARY CARE CLINIC | Age: 44
End: 2019-09-03

## 2019-09-03 DIAGNOSIS — Z79.899 MEDICATION MANAGEMENT: Primary | ICD-10-CM

## 2019-09-03 DIAGNOSIS — F51.04 PSYCHOPHYSIOLOGICAL INSOMNIA: ICD-10-CM

## 2019-09-04 DIAGNOSIS — Z79.899 HIGH RISK MEDICATION USE: ICD-10-CM

## 2019-09-04 RX ORDER — ZOLPIDEM TARTRATE 5 MG/1
5 TABLET ORAL NIGHTLY PRN
Qty: 14 TABLET | Refills: 0 | Status: SHIPPED | OUTPATIENT
Start: 2019-09-04 | End: 2019-09-19 | Stop reason: SDUPTHER

## 2019-09-19 ENCOUNTER — TELEPHONE (OUTPATIENT)
Dept: PRIMARY CARE CLINIC | Age: 44
End: 2019-09-19

## 2019-09-19 DIAGNOSIS — F51.04 PSYCHOPHYSIOLOGICAL INSOMNIA: ICD-10-CM

## 2019-09-19 DIAGNOSIS — Z79.899 MEDICATION MANAGEMENT: ICD-10-CM

## 2019-09-19 RX ORDER — ZOLPIDEM TARTRATE 10 MG/1
10 TABLET ORAL NIGHTLY PRN
Qty: 14 TABLET | Refills: 0 | Status: SHIPPED | OUTPATIENT
Start: 2019-09-19 | End: 2019-09-30 | Stop reason: ALTCHOICE

## 2019-09-19 NOTE — TELEPHONE ENCOUNTER
Pt called stating she need a refill on her meds that helps her sleep. She also wants to know if the Dr can up her dose she cannot sleep at night. She only has 1 left.     Call patient to let her know if a script is sent over    Last 08/26/19  Next 09/30/19

## 2019-09-30 ENCOUNTER — OFFICE VISIT (OUTPATIENT)
Dept: PRIMARY CARE CLINIC | Age: 44
End: 2019-09-30
Payer: COMMERCIAL

## 2019-09-30 VITALS
DIASTOLIC BLOOD PRESSURE: 80 MMHG | RESPIRATION RATE: 16 BRPM | WEIGHT: 119.6 LBS | OXYGEN SATURATION: 97 % | SYSTOLIC BLOOD PRESSURE: 120 MMHG | HEIGHT: 66 IN | BODY MASS INDEX: 19.22 KG/M2 | HEART RATE: 70 BPM

## 2019-09-30 DIAGNOSIS — Z79.899 MEDICATION MANAGEMENT: ICD-10-CM

## 2019-09-30 DIAGNOSIS — Z23 NEED FOR IMMUNIZATION AGAINST INFLUENZA: ICD-10-CM

## 2019-09-30 DIAGNOSIS — F51.04 PSYCHOPHYSIOLOGICAL INSOMNIA: ICD-10-CM

## 2019-09-30 DIAGNOSIS — F17.219 CIGARETTE NICOTINE DEPENDENCE WITH NICOTINE-INDUCED DISORDER: ICD-10-CM

## 2019-09-30 DIAGNOSIS — F41.9 ANXIETY: ICD-10-CM

## 2019-09-30 DIAGNOSIS — Z23 ENCOUNTER FOR IMMUNIZATION: ICD-10-CM

## 2019-09-30 DIAGNOSIS — Z79.899 HIGH RISK MEDICATION USE: ICD-10-CM

## 2019-09-30 DIAGNOSIS — S93.402D SPRAIN OF LEFT ANKLE, UNSPECIFIED LIGAMENT, SUBSEQUENT ENCOUNTER: Primary | ICD-10-CM

## 2019-09-30 LAB
AMPHETAMINE SCREEN, URINE: NEGATIVE
BARBITURATE SCREEN, URINE: POSITIVE
BENZODIAZEPINE SCREEN, URINE: POSITIVE
BUPRENORPHINE URINE: NORMAL
COCAINE METABOLITE SCREEN URINE: NEGATIVE
GABAPENTIN SCREEN, URINE: NORMAL
MDMA URINE: NEGATIVE
METHADONE SCREEN, URINE: NEGATIVE
METHAMPHETAMINE, URINE: NEGATIVE
OPIATE SCREEN URINE: NEGATIVE
OXYCODONE SCREEN URINE: NEGATIVE
PHENCYCLIDINE SCREEN URINE: NEGATIVE
PROPOXYPHENE SCREEN, URINE: NORMAL
THC SCREEN, URINE: NEGATIVE
TRICYCLIC ANTIDEPRESSANTS, UR: NEGATIVE

## 2019-09-30 PROCEDURE — G8420 CALC BMI NORM PARAMETERS: HCPCS | Performed by: NURSE PRACTITIONER

## 2019-09-30 PROCEDURE — 80305 DRUG TEST PRSMV DIR OPT OBS: CPT | Performed by: NURSE PRACTITIONER

## 2019-09-30 PROCEDURE — G8427 DOCREV CUR MEDS BY ELIG CLIN: HCPCS | Performed by: NURSE PRACTITIONER

## 2019-09-30 PROCEDURE — 90472 IMMUNIZATION ADMIN EACH ADD: CPT | Performed by: NURSE PRACTITIONER

## 2019-09-30 PROCEDURE — 90732 PPSV23 VACC 2 YRS+ SUBQ/IM: CPT | Performed by: NURSE PRACTITIONER

## 2019-09-30 PROCEDURE — 99214 OFFICE O/P EST MOD 30 MIN: CPT | Performed by: NURSE PRACTITIONER

## 2019-09-30 PROCEDURE — 4004F PT TOBACCO SCREEN RCVD TLK: CPT | Performed by: NURSE PRACTITIONER

## 2019-09-30 PROCEDURE — 90471 IMMUNIZATION ADMIN: CPT | Performed by: NURSE PRACTITIONER

## 2019-09-30 PROCEDURE — 90686 IIV4 VACC NO PRSV 0.5 ML IM: CPT | Performed by: NURSE PRACTITIONER

## 2019-09-30 RX ORDER — ZOLPIDEM TARTRATE 12.5 MG/1
12.5 TABLET, FILM COATED, EXTENDED RELEASE ORAL NIGHTLY PRN
Qty: 14 TABLET | Refills: 0 | Status: SHIPPED | OUTPATIENT
Start: 2019-09-30 | End: 2019-10-11 | Stop reason: SDUPTHER

## 2019-09-30 RX ORDER — ALPRAZOLAM 0.5 MG/1
0.5 TABLET ORAL 3 TIMES DAILY PRN
Qty: 60 TABLET | Refills: 0 | Status: SHIPPED | OUTPATIENT
Start: 2019-09-30 | End: 2019-10-28 | Stop reason: SDUPTHER

## 2019-09-30 RX ORDER — TRAMADOL HYDROCHLORIDE 50 MG/1
50 TABLET ORAL EVERY 8 HOURS PRN
Qty: 20 TABLET | Refills: 0 | Status: SHIPPED | OUTPATIENT
Start: 2019-09-30 | End: 2019-10-11 | Stop reason: SDUPTHER

## 2019-09-30 ASSESSMENT — ENCOUNTER SYMPTOMS
CONSTIPATION: 0
EYE DISCHARGE: 0
BACK PAIN: 1
NAUSEA: 0
DIARRHEA: 0
SHORTNESS OF BREATH: 0
EYE ITCHING: 0
COUGH: 0

## 2019-09-30 NOTE — PROGRESS NOTES
[address] [address] PRIMARY CARE  [address]  Dept: [phone]    Francisca Blackwell is a 53 y.o. female who presents today for her medical conditions/complaintsas noted below. Chief Complaint   Patient presents with    Follow-up     Patient is here for a 1 month follow up, states she has gained weight after the new medication was prescribed.  Follow-Up from Hospital     Patient went to [facility] ED after dropping a plastic garbage can full of food while moving. She had bruisied her foot, bilateral arms, abrasion of right elbow, hit head. The hospital states she did not break any bone but severly sprained and bruised. HPI:     HPI  9/23 [facility]  - after dropping garbage can full of food on self    Ambien knocks her out but she wakes up at 11 - 11:30 pm each night and then can not go back to sleep.   Megestrol is working well for appetite  She is no smoking marijuana  Still very anxious   is rehab again after her spent all money over 2 week period    No results found for: LDLCHOLESTEROL, [facility]    (goal LDL is <100)   AST (U/L)   Date Value   05/30/2018 16     ALT (U/L)   Date Value   05/30/2018 14     BUN (mg/dL)   Date Value   05/30/2018 7     BP Readings from Last 3 Encounters:   09/30/19 120/80   08/26/19 128/88   06/24/19 102/68          (goal 120/80)    Past Medical History:   Diagnosis Date    Endometriosis     Fibroid tumor 2017    Ovarian cyst       Past Surgical History:   Procedure Laterality Date    BREAST SURGERY  2004    lumpectomy of the L     HYSTERECTOMY      cervix was taken       Family History   Problem Relation Age of Onset    Colon Cancer Paternal Grandfather        Social History     Tobacco Use    Smoking status: Current Every Day Smoker     Packs/day: 0.50     Years: 8.00     Pack years: 4.00     Types: Cigarettes    Smokeless tobacco: Never Used   Substance Use Topics  Alcohol use: No     Comment: social      Current Outpatient Medications   Medication Sig Dispense Refill    ALPRAZolam (XANAX) 0.5 MG tablet Take 1 tablet by mouth 3 times daily as needed for Sleep or Anxiety for up to 30 days. 60 tablet 0    zolpidem (AMBIEN CR) 12.5 MG extended release tablet Take 1 tablet by mouth nightly as needed for Sleep for up to 14 days. 14 tablet 0    traMADol (ULTRAM) 50 MG tablet Take 1 tablet by mouth every 8 hours as needed for Pain for up to 7 days. 20 tablet 0    megestrol (MEGACE) 40 MG/ML suspension Take 10 mLs by mouth daily 300 mL 2    ibuprofen (ADVIL;MOTRIN) 800 MG tablet Take 1 tablet by mouth 3 times daily (with meals) 90 tablet 1    butalbital-acetaminophen-caffeine (FIORICET, ESGIC) -40 MG per tablet Take 1 tablet by mouth every 6 hours as needed for Headaches 60 tablet 1    triamcinolone (KENALOG) 0.1 % ointment Apply topically 2 times daily 1 Tube 3    mirtazapine (REMERON) 7.5 MG tablet Take 1 tablet by mouth nightly 30 tablet 5    Nutritional Supplements (BOOST) LIQD Take 1 Can by mouth 2 times daily (Patient not taking: Reported on 9/30/2019) 36 Can 3     No current facility-administered medications for this visit. Allergies   Allergen Reactions    Diphenhydramine Itching       Health Maintenance   Topic Date Due    Pneumococcal 0-64 years Vaccine (1 of 1 - PPSV23) 03/27/1979    HIV screen  03/27/1988    DTaP/Tdap/Td vaccine (1 - Tdap) 03/27/1992    Cervical cancer screen  03/27/1994    Lipid screen  03/27/2013    Flu vaccine (1) 09/01/2019       Subjective:      Review of Systems   Constitutional: Positive for fatigue. Negative for chills and fever. HENT: Negative for congestion, ear pain, hearing loss and nosebleeds. Eyes: Negative for discharge and itching. Respiratory: Negative for cough and shortness of breath. Cardiovascular: Negative for chest pain, palpitations and leg swelling.    Gastrointestinal: Negative for drug abuse or diversion identified and OARRS report reviewed today- activity consistent with treatment plan. Urine drug screen    Assessment:       Diagnosis Orders   1. Sprain of left ankle, unspecified ligament, subsequent encounter  traMADol (ULTRAM) 50 MG tablet   2. Medication management  ALPRAZolam (XANAX) 0.5 MG tablet   3. Psychophysiological insomnia  zolpidem (AMBIEN CR) 12.5 MG extended release tablet   4. Anxiety  ALPRAZolam (XANAX) 0.5 MG tablet   5. High risk medication use  POCT Rapid Drug Screen   6. Cigarette nicotine dependence with nicotine-induced disorder  PNEUMOVAX 23 subcutaneous/IM (Pneumococcal polysaccharide vaccine 23-valent >= 3yo)   7. Need for immunization against influenza  INFLUENZA, QUADV, 3 YRS AND OLDER, IM PF, PREFILL SYR OR SDV, 0.5ML (AFLURIA QUADV, PF)   8. Encounter for immunization  PNEUMOVAX 23 subcutaneous/IM (Pneumococcal polysaccharide vaccine 23-valent >= 3yo)        Plan:    Increase alprazolam to 0. 5 mg  Change zolpidem to 12.5 mg ER  Continue megestrol and mirtazapine  Flu and pneumovax 23 vaccine today  Tramadol X 1 wk  Return in about 4 weeks (around 10/28/2019) for meed check. Orders Placed This Encounter   Procedures    INFLUENZA, QUADV, 3 YRS AND OLDER, IM PF, PREFILL SYR OR SDV, 0.5ML (AFLURIA QUADV, PF)    PNEUMOVAX 23 subcutaneous/IM (Pneumococcal polysaccharide vaccine 23-valent >= 3yo)    POCT Rapid Drug Screen     Orders Placed This Encounter   Medications    ALPRAZolam (XANAX) 0.5 MG tablet     Sig: Take 1 tablet by mouth 3 times daily as needed for Sleep or Anxiety for up to 30 days. Dispense:  60 tablet     Refill:  0    zolpidem (AMBIEN CR) 12.5 MG extended release tablet     Sig: Take 1 tablet by mouth nightly as needed for Sleep for up to 14 days. Dispense:  14 tablet     Refill:  0    traMADol (ULTRAM) 50 MG tablet     Sig: Take 1 tablet by mouth every 8 hours as needed for Pain for up to 7 days.      Dispense:  20 tablet     Refill:

## 2019-09-30 NOTE — PATIENT INSTRUCTIONS
exercises    1. While sitting, put your feet together flat on the floor. 2. Press your injured foot inward against your other foot. Hold for about 6 seconds, and relax. Repeat 8 to 12 times. 3. Then place the heel of your other foot on top of the injured one. Push down with the top heel while trying to push up with your injured foot. Hold for about 6 seconds, and relax. Repeat 8 to 12 times. Resisted ankle inversion    1. Sit on the floor with your good leg crossed over your other leg. 2. Hold both ends of an exercise band and loop the band around the inside of your affected foot. Then press your other foot against the band. 3. Keeping your legs crossed, slowly push your affected foot against the band so that foot moves away from your other foot. Then slowly relax. 4. Repeat 8 to 12 times. Resisted ankle eversion    1. Sit on the floor with your legs straight. 2. Hold both ends of an exercise band and loop the band around the outside of your affected foot. Then press your other foot against the band. 3. Keeping your leg straight, slowly push your affected foot outward against the band and away from your other foot without letting your leg rotate. Then slowly relax. 4. Repeat 8 to 12 times. Resisted ankle dorsiflexion    1. Tie the ends of an exercise band together to form a loop. Attach one end of the loop to a secure object or shut a door on it to hold it in place. (Or you can have someone hold one end of the loop to provide resistance.)  2. While sitting on the floor or in a chair, loop the other end of the band over the top of your affected foot. 3. Keeping your knee and leg straight, slowly flex your foot to pull back on the exercise band, and then slowly relax. 4. Repeat 8 to 12 times. Single-leg balance    1. Stand on a flat surface with your arms stretched out to your sides like you are making the letter \"T. \" Then lift your good leg off the floor, bending it at the knee.  If you are not steady on your feet, use one hand to hold on to a chair, counter, or wall. 2. Standing on the leg with your affected ankle, keep that knee straight. Try to balance on that leg for up to 30 seconds. Then rest for up to 10 seconds. 3. Repeat 6 to 8 times. 4. When you can balance on your affected leg for 30 seconds with your eyes open, try to balance on it with your eyes closed. 5. When you can do this exercise with your eyes closed for 30 seconds and with ease and no pain, try standing on a pillow or piece of foam, and repeat steps 1 through 4. Follow-up care is a key part of your treatment and safety. Be sure to make and go to all appointments, and call your doctor if you are having problems. It's also a good idea to know your test results and keep a list of the medicines you take. Where can you learn more? Go to [email or web address] and sign in to your [facility] account. Enter Tyree Ruth in the [facility] box to learn more about \"Ankle Sprain: Rehab Exercises. \"     If you do not have an account, please click on the \"Sign Up Now\" link. Current as of: September 20, 2018  Content Version: 12.1  © 5326-2276 Healthwise, Incorporated. Care instructions adapted under license by [facility] ([facility]). If you have questions about a medical condition or this instruction, always ask your healthcare professional. [place] [address] any warranty or liability for your use of this information. Patient Education        Influenza (Flu) Vaccine (Inactivated or Recombinant): What You Need to Know  Why get vaccinated? Influenza (\"flu\") is a contagious disease that spreads around the [place] every winter, usually between October and May. Flu is caused by influenza viruses and is spread mainly by coughing, sneezing, and close contact. Anyone can get flu. Flu strikes suddenly and can last several days. Symptoms vary by age, but can include:  · Fever/chills.   · Sore serious reactions are also possible. About half of people who get PPSV have mild side effects, such as redness or pain where the shot is given, which go away within about two days. Less than 1 out of 100 people develop a fever, muscle aches, or more severe local reactions. Problems that could happen after any vaccine:  · People sometimes faint after a medical procedure, including vaccination. Sitting or lying down for about 15 minutes can help prevent fainting, and injuries caused by a fall. Tell your doctor if you feel dizzy, or have vision changes or ringing in the ears. · Some people get severe pain in the shoulder and have difficulty moving the arm where a shot was given. This happens very rarely. · Any medication can cause a severe allergic reaction. Such reactions from a vaccine are very rare, estimated at about 1 in a million doses, and would happen within a few minutes to a few hours after the vaccination. As with any medicine, there is a very remote chance of a vaccine causing a serious injury or death. The safety of vaccines is always being monitored. For more information, visit: www.cdc.gov/vaccinesafety/  What if there is a serious reaction? What should I look for? Look for anything that concerns you, such as signs of a severe allergic reaction, very high fever, or unusual behavior. Signs of a severe allergic reaction can include hives, swelling of the face and throat, difficulty breathing, a fast heartbeat, dizziness, and weakness. These would usually start a few minutes to a few hours after the vaccination. What should I do? If you think it is a severe allergic reaction or other emergency that can't wait, call 9-1-1 or get to the nearest hospital. Otherwise, call your doctor. Afterward, the reaction should be reported to the Vaccine Adverse Event Reporting System (VAERS). Your doctor might file this report, or you can do it yourself through the VAERS web site at www.vaers. hhs.gov, or by calling 4-584.621.5861. PAT does not give medical advice. How can I learn more? · Ask your doctor. He or she can give you the vaccine package insert or suggest other sources of information. · Call your local or state health department. · Contact the Centers for Disease Control and Prevention (CDC):  ? Call 1-353.910.4977 (9-095-KDV-INFO) or  ? Visit CDC's website at www.cdc.gov/vaccines  Vaccine Information Statement  PPSV Vaccine  (04/24/2015)  Department of Health and Human Services  Centers for Disease Control and Prevention  Many Vaccine Information Statements are available in [place] and other languages. See www.immunize.org/vis. Hojas de información Sobre Vacunas están disponibles en español y en muchos otros idiomas. Visite Laurent.si. Care instructions adapted under license by [facility] ([facility]). If you have questions about a medical condition or this instruction, always ask your healthcare professional. [address] any warranty or liability for your use of this information.

## 2019-10-11 DIAGNOSIS — F51.04 PSYCHOPHYSIOLOGICAL INSOMNIA: ICD-10-CM

## 2019-10-11 DIAGNOSIS — S93.402D SPRAIN OF LEFT ANKLE, UNSPECIFIED LIGAMENT, SUBSEQUENT ENCOUNTER: ICD-10-CM

## 2019-10-11 RX ORDER — ZOLPIDEM TARTRATE 12.5 MG/1
12.5 TABLET, FILM COATED, EXTENDED RELEASE ORAL NIGHTLY PRN
Qty: 14 TABLET | Refills: 0 | Status: SHIPPED | OUTPATIENT
Start: 2019-10-11 | End: 2019-10-14 | Stop reason: SDUPTHER

## 2019-10-11 RX ORDER — TRAMADOL HYDROCHLORIDE 50 MG/1
50 TABLET ORAL EVERY 8 HOURS PRN
Qty: 20 TABLET | Refills: 0 | Status: SHIPPED | OUTPATIENT
Start: 2019-10-11 | End: 2019-10-18

## 2019-10-14 DIAGNOSIS — F51.04 PSYCHOPHYSIOLOGICAL INSOMNIA: ICD-10-CM

## 2019-10-14 RX ORDER — ZOLPIDEM TARTRATE 12.5 MG/1
12.5 TABLET, FILM COATED, EXTENDED RELEASE ORAL NIGHTLY PRN
Qty: 14 TABLET | Refills: 0 | Status: SHIPPED | OUTPATIENT
Start: 2019-10-14 | End: 2019-10-28 | Stop reason: SDUPTHER

## 2019-10-28 ENCOUNTER — OFFICE VISIT (OUTPATIENT)
Dept: PRIMARY CARE CLINIC | Age: 44
End: 2019-10-28
Payer: COMMERCIAL

## 2019-10-28 VITALS
WEIGHT: 127.3 LBS | DIASTOLIC BLOOD PRESSURE: 74 MMHG | HEART RATE: 95 BPM | SYSTOLIC BLOOD PRESSURE: 122 MMHG | BODY MASS INDEX: 20.46 KG/M2 | OXYGEN SATURATION: 98 % | HEIGHT: 66 IN

## 2019-10-28 DIAGNOSIS — F41.9 ANXIETY: ICD-10-CM

## 2019-10-28 DIAGNOSIS — G44.029 CHRONIC CLUSTER HEADACHE, NOT INTRACTABLE: ICD-10-CM

## 2019-10-28 DIAGNOSIS — F51.04 PSYCHOPHYSIOLOGICAL INSOMNIA: ICD-10-CM

## 2019-10-28 DIAGNOSIS — Z79.899 MEDICATION MANAGEMENT: ICD-10-CM

## 2019-10-28 PROCEDURE — G8420 CALC BMI NORM PARAMETERS: HCPCS | Performed by: NURSE PRACTITIONER

## 2019-10-28 PROCEDURE — 99213 OFFICE O/P EST LOW 20 MIN: CPT | Performed by: NURSE PRACTITIONER

## 2019-10-28 PROCEDURE — G8482 FLU IMMUNIZE ORDER/ADMIN: HCPCS | Performed by: NURSE PRACTITIONER

## 2019-10-28 PROCEDURE — G8427 DOCREV CUR MEDS BY ELIG CLIN: HCPCS | Performed by: NURSE PRACTITIONER

## 2019-10-28 PROCEDURE — 4004F PT TOBACCO SCREEN RCVD TLK: CPT | Performed by: NURSE PRACTITIONER

## 2019-10-28 RX ORDER — ZOLPIDEM TARTRATE 12.5 MG/1
12.5 TABLET, FILM COATED, EXTENDED RELEASE ORAL NIGHTLY PRN
Qty: 14 TABLET | Refills: 0 | Status: SHIPPED | OUTPATIENT
Start: 2019-10-28 | End: 2019-11-19 | Stop reason: SDUPTHER

## 2019-10-28 RX ORDER — ALPRAZOLAM 1 MG/1
1 TABLET ORAL 2 TIMES DAILY PRN
Qty: 60 TABLET | Refills: 0 | Status: SHIPPED | OUTPATIENT
Start: 2019-10-28 | End: 2019-12-11 | Stop reason: SDUPTHER

## 2019-10-28 RX ORDER — BUTALBITAL, ACETAMINOPHEN AND CAFFEINE 50; 325; 40 MG/1; MG/1; MG/1
1 TABLET ORAL EVERY 6 HOURS PRN
Qty: 60 TABLET | Refills: 1 | Status: SHIPPED | OUTPATIENT
Start: 2019-10-28 | End: 2019-12-11 | Stop reason: SDUPTHER

## 2019-10-28 ASSESSMENT — ENCOUNTER SYMPTOMS
COUGH: 0
NAUSEA: 0
SINUS PAIN: 0
SHORTNESS OF BREATH: 0
EYE DISCHARGE: 0
CONSTIPATION: 0
EYE ITCHING: 0
EYE PAIN: 0
DIARRHEA: 0

## 2019-11-19 DIAGNOSIS — F51.04 PSYCHOPHYSIOLOGICAL INSOMNIA: ICD-10-CM

## 2019-11-19 RX ORDER — ZOLPIDEM TARTRATE 12.5 MG/1
12.5 TABLET, FILM COATED, EXTENDED RELEASE ORAL NIGHTLY PRN
Qty: 14 TABLET | Refills: 0 | Status: SHIPPED | OUTPATIENT
Start: 2019-11-19 | End: 2019-12-11 | Stop reason: SDUPTHER

## 2019-12-11 DIAGNOSIS — F41.9 ANXIETY: ICD-10-CM

## 2019-12-11 DIAGNOSIS — F51.04 PSYCHOPHYSIOLOGICAL INSOMNIA: ICD-10-CM

## 2019-12-11 DIAGNOSIS — G44.029 CHRONIC CLUSTER HEADACHE, NOT INTRACTABLE: ICD-10-CM

## 2019-12-11 DIAGNOSIS — Z79.899 MEDICATION MANAGEMENT: ICD-10-CM

## 2019-12-11 RX ORDER — BUTALBITAL, ACETAMINOPHEN AND CAFFEINE 50; 325; 40 MG/1; MG/1; MG/1
1 TABLET ORAL EVERY 6 HOURS PRN
Qty: 60 TABLET | Refills: 1 | Status: SHIPPED | OUTPATIENT
Start: 2019-12-11 | End: 2020-03-18 | Stop reason: SDUPTHER

## 2019-12-11 RX ORDER — ALPRAZOLAM 1 MG/1
1 TABLET ORAL 2 TIMES DAILY PRN
Qty: 60 TABLET | Refills: 0 | Status: SHIPPED | OUTPATIENT
Start: 2019-12-11 | End: 2020-01-16

## 2019-12-11 RX ORDER — ZOLPIDEM TARTRATE 12.5 MG/1
12.5 TABLET, FILM COATED, EXTENDED RELEASE ORAL NIGHTLY PRN
Qty: 14 TABLET | Refills: 0 | Status: SHIPPED | OUTPATIENT
Start: 2019-12-11 | End: 2019-12-30 | Stop reason: SDUPTHER

## 2019-12-30 ENCOUNTER — OFFICE VISIT (OUTPATIENT)
Dept: PRIMARY CARE CLINIC | Age: 44
End: 2019-12-30
Payer: COMMERCIAL

## 2019-12-30 VITALS
OXYGEN SATURATION: 98 % | HEART RATE: 87 BPM | WEIGHT: 126.8 LBS | BODY MASS INDEX: 20.38 KG/M2 | HEIGHT: 66 IN | DIASTOLIC BLOOD PRESSURE: 80 MMHG | SYSTOLIC BLOOD PRESSURE: 118 MMHG | RESPIRATION RATE: 16 BRPM

## 2019-12-30 DIAGNOSIS — J01.00 ACUTE NON-RECURRENT MAXILLARY SINUSITIS: Primary | ICD-10-CM

## 2019-12-30 DIAGNOSIS — F51.01 PRIMARY INSOMNIA: ICD-10-CM

## 2019-12-30 DIAGNOSIS — F51.04 PSYCHOPHYSIOLOGICAL INSOMNIA: ICD-10-CM

## 2019-12-30 DIAGNOSIS — Z79.899 MEDICATION MANAGEMENT: ICD-10-CM

## 2019-12-30 DIAGNOSIS — F41.9 ANXIETY: ICD-10-CM

## 2019-12-30 PROBLEM — F12.90 MARIJUANA USE: Status: RESOLVED | Noted: 2019-06-24 | Resolved: 2019-12-30

## 2019-12-30 PROCEDURE — 4004F PT TOBACCO SCREEN RCVD TLK: CPT | Performed by: NURSE PRACTITIONER

## 2019-12-30 PROCEDURE — G8427 DOCREV CUR MEDS BY ELIG CLIN: HCPCS | Performed by: NURSE PRACTITIONER

## 2019-12-30 PROCEDURE — G8420 CALC BMI NORM PARAMETERS: HCPCS | Performed by: NURSE PRACTITIONER

## 2019-12-30 PROCEDURE — G8482 FLU IMMUNIZE ORDER/ADMIN: HCPCS | Performed by: NURSE PRACTITIONER

## 2019-12-30 PROCEDURE — 99214 OFFICE O/P EST MOD 30 MIN: CPT | Performed by: NURSE PRACTITIONER

## 2019-12-30 RX ORDER — ZOLPIDEM TARTRATE 12.5 MG/1
12.5 TABLET, FILM COATED, EXTENDED RELEASE ORAL NIGHTLY PRN
COMMUNITY
End: 2019-12-30 | Stop reason: SDUPTHER

## 2019-12-30 RX ORDER — BENZONATATE 200 MG/1
200 CAPSULE ORAL 3 TIMES DAILY PRN
Qty: 30 CAPSULE | Refills: 0 | Status: SHIPPED | OUTPATIENT
Start: 2019-12-30 | End: 2020-01-09

## 2019-12-30 RX ORDER — ZOLPIDEM TARTRATE 12.5 MG/1
12.5 TABLET, FILM COATED, EXTENDED RELEASE ORAL NIGHTLY PRN
Qty: 14 TABLET | Refills: 0 | Status: SHIPPED | OUTPATIENT
Start: 2019-12-30 | End: 2020-01-16

## 2019-12-30 RX ORDER — ECHINACEA PURPUREA EXTRACT 125 MG
TABLET ORAL
Qty: 1 BOTTLE | Refills: 3 | Status: SHIPPED | OUTPATIENT
Start: 2019-12-30 | End: 2020-06-05 | Stop reason: ALTCHOICE

## 2019-12-30 RX ORDER — AMOXICILLIN AND CLAVULANATE POTASSIUM 875; 125 MG/1; MG/1
1 TABLET, FILM COATED ORAL 2 TIMES DAILY
Qty: 14 TABLET | Refills: 0 | Status: SHIPPED | OUTPATIENT
Start: 2019-12-30 | End: 2020-02-13 | Stop reason: SDUPTHER

## 2019-12-30 ASSESSMENT — ENCOUNTER SYMPTOMS
CHOKING: 0
SHORTNESS OF BREATH: 0
BACK PAIN: 0
SINUS PAIN: 0
COUGH: 1
NAUSEA: 0
CONSTIPATION: 0
DIARRHEA: 0
EYE REDNESS: 0
SINUS PRESSURE: 1
EYE PAIN: 0

## 2020-01-16 RX ORDER — BENZONATATE 200 MG/1
200 CAPSULE ORAL
COMMUNITY
Start: 2019-12-06 | End: 2020-02-14 | Stop reason: SDUPTHER

## 2020-01-16 RX ORDER — ALPRAZOLAM 1 MG/1
TABLET ORAL
Qty: 60 TABLET | Refills: 0 | Status: SHIPPED | OUTPATIENT
Start: 2020-01-16 | End: 2020-02-18 | Stop reason: SDUPTHER

## 2020-01-16 RX ORDER — ALPRAZOLAM 1 MG/1
TABLET ORAL
COMMUNITY
Start: 2020-01-16 | End: 2020-01-16 | Stop reason: SDUPTHER

## 2020-01-16 RX ORDER — ZOLPIDEM TARTRATE 12.5 MG/1
12.5 TABLET, FILM COATED, EXTENDED RELEASE ORAL NIGHTLY PRN
Qty: 30 TABLET | Refills: 0 | Status: SHIPPED | OUTPATIENT
Start: 2020-01-16 | End: 2020-02-15

## 2020-01-16 RX ORDER — ZOLPIDEM TARTRATE 6.25 MG/1
6.25 TABLET, FILM COATED, EXTENDED RELEASE ORAL
COMMUNITY
End: 2020-01-16 | Stop reason: SDUPTHER

## 2020-01-16 RX ORDER — ALPRAZOLAM 0.5 MG/1
0.5 TABLET ORAL
COMMUNITY
End: 2020-01-16 | Stop reason: SDUPTHER

## 2020-02-13 RX ORDER — AMOXICILLIN AND CLAVULANATE POTASSIUM 875; 125 MG/1; MG/1
1 TABLET, FILM COATED ORAL 2 TIMES DAILY
Qty: 14 TABLET | Refills: 0 | Status: SHIPPED | OUTPATIENT
Start: 2020-02-13 | End: 2020-03-06 | Stop reason: SDUPTHER

## 2020-02-14 RX ORDER — BENZONATATE 200 MG/1
200 CAPSULE ORAL 3 TIMES DAILY PRN
Qty: 30 CAPSULE | Refills: 0 | Status: SHIPPED | OUTPATIENT
Start: 2020-02-14 | End: 2020-03-31

## 2020-02-18 RX ORDER — ALPRAZOLAM 1 MG/1
TABLET ORAL
Qty: 60 TABLET | Refills: 0 | Status: SHIPPED | OUTPATIENT
Start: 2020-02-18 | End: 2020-03-18 | Stop reason: SDUPTHER

## 2020-02-18 RX ORDER — ZOLPIDEM TARTRATE 10 MG/1
10 TABLET ORAL NIGHTLY PRN
Qty: 30 TABLET | Refills: 0 | Status: SHIPPED | OUTPATIENT
Start: 2020-02-18 | End: 2020-03-19

## 2020-02-18 NOTE — TELEPHONE ENCOUNTER
Zolpidem 10 mg sent to pharmacy. Take one tablet nightly as needed for insomnia. Please advise that it is not recommended to take it every night. She can  Become dependent it on it.

## 2020-03-01 ENCOUNTER — HOSPITAL ENCOUNTER (EMERGENCY)
Age: 45
Discharge: HOME | End: 2020-03-01
Attending: EMERGENCY MEDICINE
Payer: COMMERCIAL

## 2020-03-01 VITALS
DIASTOLIC BLOOD PRESSURE: 82 MMHG | WEIGHT: 130 LBS | HEIGHT: 66 IN | OXYGEN SATURATION: 98 % | HEART RATE: 113 BPM | RESPIRATION RATE: 20 BRPM | BODY MASS INDEX: 20.89 KG/M2 | SYSTOLIC BLOOD PRESSURE: 136 MMHG | TEMPERATURE: 100.8 F

## 2020-03-01 LAB
DIRECT EXAM: NORMAL
DIRECT EXAM: NORMAL
Lab: NORMAL
Lab: NORMAL
SPECIMEN DESCRIPTION: NORMAL
SPECIMEN DESCRIPTION: NORMAL

## 2020-03-01 PROCEDURE — 87804 INFLUENZA ASSAY W/OPTIC: CPT

## 2020-03-01 PROCEDURE — 87880 STREP A ASSAY W/OPTIC: CPT

## 2020-03-01 PROCEDURE — 6370000000 HC RX 637 (ALT 250 FOR IP): Performed by: EMERGENCY MEDICINE

## 2020-03-01 PROCEDURE — 99283 EMERGENCY DEPT VISIT LOW MDM: CPT

## 2020-03-01 RX ORDER — IBUPROFEN 800 MG/1
800 TABLET ORAL ONCE
Status: COMPLETED | OUTPATIENT
Start: 2020-03-01 | End: 2020-03-01

## 2020-03-01 RX ORDER — ACETAMINOPHEN 500 MG
1000 TABLET ORAL ONCE
Status: COMPLETED | OUTPATIENT
Start: 2020-03-01 | End: 2020-03-01

## 2020-03-01 RX ADMIN — IBUPROFEN 800 MG: 800 TABLET, FILM COATED ORAL at 09:19

## 2020-03-01 RX ADMIN — ACETAMINOPHEN 1000 MG: 500 TABLET, FILM COATED ORAL at 09:19

## 2020-03-01 ASSESSMENT — PAIN DESCRIPTION - PAIN TYPE: TYPE: ACUTE PAIN

## 2020-03-01 ASSESSMENT — PAIN SCALES - GENERAL
PAINLEVEL_OUTOF10: 9
PAINLEVEL_OUTOF10: 9

## 2020-03-01 ASSESSMENT — PAIN DESCRIPTION - LOCATION: LOCATION: THROAT

## 2020-03-01 NOTE — ED NOTES
Mode of arrival (squad #, walk in, police, etc) : walk in        Chief complaint(s): pharyngitis/fever/generalized body aches        Arrival Note (brief scenario, treatment PTA, etc). : Pt reports onset of symptoms yesterday. C= \"Have you ever felt that you should Cut down on your drinking? \"  No  A= \"Have people Annoyed you by criticizing your drinking? \"  No  G= \"Have you ever felt bad or Guilty about your drinking? \"  No  E= \"Have you ever had a drink as an Eye-opener first thing in the morning to steady your nerves or to help a hangover? \"  No      Deferred []      Reason for deferring: N/A    *If yes to two or more: probable alcohol abuse. Mary Benton RN  03/01/20 4242

## 2020-03-03 ASSESSMENT — ENCOUNTER SYMPTOMS
COUGH: 0
SINUS PRESSURE: 0
SHORTNESS OF BREATH: 0
DIARRHEA: 0
COLOR CHANGE: 0
NAUSEA: 0
ABDOMINAL PAIN: 0
PHOTOPHOBIA: 0
SORE THROAT: 1

## 2020-03-04 NOTE — ED PROVIDER NOTES
MG tablet Take 1 tablet by mouth 3 times daily (with meals)  ANITA Little CNP   triamcinolone (KENALOG) 0.1 % ointment Apply topically 2 times daily  ANITA Little CNP     Please note that medications prescribed at discharge will auto-populate into this medication list when note is refreshed. Please look at prescription date andprescriber to clarify. Family History:family history includes Colon Cancer in her paternal grandfather. Social History: She reports that she has been smoking cigarettes. She has a 4.00 pack-year smoking history. She has never used smokeless tobacco. She reports current drug use. Frequency: 7.00 times per week. Drug: Marijuana. She reports that she does not drink alcohol. She has no history on file for sexual activity. REVIEW OF SYSTEMS    (2-9 systems for level 4, 10 or more for level 5)      Review of Systems   Constitutional: Positive for chills, fatigue and fever. HENT: Positive for sore throat. Negative for congestion, nosebleeds and sinus pressure. Eyes: Negative for photophobia and visual disturbance. Respiratory: Negative for cough and shortness of breath. Cardiovascular: Negative for chest pain and palpitations. Gastrointestinal: Negative for abdominal pain, diarrhea and nausea. Genitourinary: Negative for difficulty urinating. Musculoskeletal: Positive for myalgias. Negative for arthralgias. Skin: Negative for color change and rash. Neurological: Positive for headaches. Negative for dizziness and light-headedness. PHYSICAL EXAM   (up to 7 for level 4, 8 or more for level 5)      Initial Vitals   ED Triage Vitals [03/01/20 0905]   BP Temp Temp src Pulse Resp SpO2 Height Weight   136/82 100.8 °F (38.2 °C) -- 113 20 98 % 5' 6\" (1.676 m) 130 lb (59 kg)       Physical Exam  Vitals signs and nursing note reviewed. Constitutional:       General: She is not in acute distress. Appearance: Normal appearance.    HENT:      Head: CNP  [address] [address]  [phone]    Schedule an appointment as soon as possible for a visit       [facility] ED  [place] [address]  [phone]  Go to   As needed, If symptoms worsen      DISCHARGE MEDICATIONS:  Discharge Medication List as of 3/1/2020 10:34 AM          Jolene Tran MD  Emergency Medicine Attending      (Please note that portions of this note were completed with a voice recognition program.  Efforts were made to edit the dictations but occasionallywords are mis-transcribed.)          Jolene Tran MD  03/03/20 2033

## 2020-03-06 ENCOUNTER — TELEPHONE (OUTPATIENT)
Dept: PRIMARY CARE CLINIC | Age: 45
End: 2020-03-06

## 2020-03-06 RX ORDER — AMOXICILLIN AND CLAVULANATE POTASSIUM 875; 125 MG/1; MG/1
1 TABLET, FILM COATED ORAL 2 TIMES DAILY
Qty: 14 TABLET | Refills: 0 | Status: SHIPPED | OUTPATIENT
Start: 2020-03-06 | End: 2020-03-31

## 2020-03-10 RX ORDER — IBUPROFEN 800 MG/1
800 TABLET ORAL
Qty: 90 TABLET | Refills: 1 | Status: SHIPPED | OUTPATIENT
Start: 2020-03-10 | End: 2020-06-05 | Stop reason: ALTCHOICE

## 2020-03-18 RX ORDER — BUTALBITAL, ACETAMINOPHEN AND CAFFEINE 50; 325; 40 MG/1; MG/1; MG/1
1 TABLET ORAL EVERY 6 HOURS PRN
Qty: 60 TABLET | Refills: 1 | Status: SHIPPED | OUTPATIENT
Start: 2020-03-18 | End: 2020-07-28 | Stop reason: ALTCHOICE

## 2020-03-18 RX ORDER — ALPRAZOLAM 1 MG/1
TABLET ORAL
Qty: 60 TABLET | Refills: 0 | Status: SHIPPED | OUTPATIENT
Start: 2020-03-18 | End: 2020-04-20 | Stop reason: SDUPTHER

## 2020-03-31 ENCOUNTER — OFFICE VISIT (OUTPATIENT)
Dept: PRIMARY CARE CLINIC | Age: 45
End: 2020-03-31
Payer: COMMERCIAL

## 2020-03-31 VITALS
SYSTOLIC BLOOD PRESSURE: 122 MMHG | OXYGEN SATURATION: 98 % | WEIGHT: 121 LBS | DIASTOLIC BLOOD PRESSURE: 76 MMHG | HEART RATE: 99 BPM | BODY MASS INDEX: 19.53 KG/M2

## 2020-03-31 PROCEDURE — G8482 FLU IMMUNIZE ORDER/ADMIN: HCPCS | Performed by: FAMILY MEDICINE

## 2020-03-31 PROCEDURE — 99213 OFFICE O/P EST LOW 20 MIN: CPT | Performed by: FAMILY MEDICINE

## 2020-03-31 PROCEDURE — G8420 CALC BMI NORM PARAMETERS: HCPCS | Performed by: FAMILY MEDICINE

## 2020-03-31 PROCEDURE — 4004F PT TOBACCO SCREEN RCVD TLK: CPT | Performed by: FAMILY MEDICINE

## 2020-03-31 PROCEDURE — G8427 DOCREV CUR MEDS BY ELIG CLIN: HCPCS | Performed by: FAMILY MEDICINE

## 2020-03-31 RX ORDER — BENZONATATE 200 MG/1
200 CAPSULE ORAL 3 TIMES DAILY PRN
Qty: 60 CAPSULE | Refills: 0 | Status: SHIPPED | OUTPATIENT
Start: 2020-03-31 | End: 2020-06-05 | Stop reason: ALTCHOICE

## 2020-03-31 RX ORDER — ZOLPIDEM TARTRATE 10 MG/1
TABLET ORAL NIGHTLY PRN
COMMUNITY
End: 2020-03-31

## 2020-03-31 RX ORDER — MEGESTROL ACETATE 40 MG/ML
10 SUSPENSION ORAL DAILY
COMMUNITY
Start: 2020-03-06 | End: 2021-07-12

## 2020-03-31 RX ORDER — AMOXICILLIN AND CLAVULANATE POTASSIUM 875; 125 MG/1; MG/1
1 TABLET, FILM COATED ORAL 2 TIMES DAILY
Qty: 28 TABLET | Refills: 0 | Status: SHIPPED | OUTPATIENT
Start: 2020-03-31 | End: 2020-04-14

## 2020-03-31 RX ORDER — ESZOPICLONE 3 MG/1
3 TABLET, FILM COATED ORAL NIGHTLY
Qty: 30 TABLET | Refills: 0 | Status: SHIPPED | OUTPATIENT
Start: 2020-03-31 | End: 2020-04-29 | Stop reason: SDUPTHER

## 2020-03-31 RX ORDER — ASCORBIC ACID 500 MG
500 TABLET ORAL 2 TIMES DAILY
COMMUNITY
End: 2020-06-05 | Stop reason: ALTCHOICE

## 2020-03-31 ASSESSMENT — PATIENT HEALTH QUESTIONNAIRE - PHQ9
SUM OF ALL RESPONSES TO PHQ QUESTIONS 1-9: 0
2. FEELING DOWN, DEPRESSED OR HOPELESS: 0
1. LITTLE INTEREST OR PLEASURE IN DOING THINGS: 0
SUM OF ALL RESPONSES TO PHQ9 QUESTIONS 1 & 2: 0
SUM OF ALL RESPONSES TO PHQ QUESTIONS 1-9: 0

## 2020-03-31 ASSESSMENT — ENCOUNTER SYMPTOMS
EYE REDNESS: 0
COUGH: 0
VOMITING: 0
SHORTNESS OF BREATH: 0
DIARRHEA: 0
SORE THROAT: 0
WHEEZING: 0
EYE DISCHARGE: 0
RHINORRHEA: 0
ABDOMINAL PAIN: 0
NAUSEA: 0

## 2020-03-31 NOTE — PROGRESS NOTES
abdominal pain, diarrhea, nausea and vomiting. Genitourinary: Negative for dysuria and frequency. Musculoskeletal: Negative for arthralgias and myalgias. Neurological: Negative for dizziness, light-headedness and headaches. Psychiatric/Behavioral: Negative for sleep disturbance. Objective:     /76   Pulse 99   Wt 121 lb (54.9 kg)   LMP 02/26/2018   SpO2 98%   BMI 19.53 kg/m²   Physical Exam  Vitals signs and nursing note reviewed. Constitutional:       General: She is not in acute distress. Appearance: She is well-developed. HENT:      Head: Normocephalic and atraumatic. Eyes:      General: No scleral icterus. Right eye: No discharge. Left eye: No discharge. Conjunctiva/sclera: Conjunctivae normal.      Pupils: Pupils are equal, round, and reactive to light. Neck:      Thyroid: No thyromegaly. Trachea: No tracheal deviation. Cardiovascular:      Rate and Rhythm: Normal rate and regular rhythm. Heart sounds: Normal heart sounds. Comments: No carotid bruits  Pulmonary:      Effort: Pulmonary effort is normal. No respiratory distress. Breath sounds: Normal breath sounds. No wheezing. Lymphadenopathy:      Cervical: No cervical adenopathy. Skin:     General: Skin is warm. Findings: No rash. Neurological:      Mental Status: She is alert and oriented to person, place, and time. Psychiatric:         Behavior: Behavior normal.         Thought Content: Thought content normal.         Assessment:       Diagnosis Orders   1. Primary insomnia  eszopiclone (ESZOPICLONE) 3 MG TABS   2. Acute non-recurrent maxillary sinusitis  amoxicillin-clavulanate (AUGMENTIN) 875-125 MG per tablet    benzonatate (TESSALON) 200 MG capsule        Plan:    Augmentin for 2 weeks for sinus infection. Trial of Lunesta 3 mg at bedtime for sleep. Tessalon Perles for cough. Return if symptoms worsen or fail to improve.     No orders of the defined types were

## 2020-04-20 RX ORDER — ALPRAZOLAM 1 MG/1
TABLET ORAL
Qty: 60 TABLET | Refills: 0 | Status: SHIPPED | OUTPATIENT
Start: 2020-04-20 | End: 2020-05-19

## 2020-04-29 RX ORDER — ESZOPICLONE 3 MG/1
3 TABLET, FILM COATED ORAL NIGHTLY
Qty: 30 TABLET | Refills: 0 | Status: SHIPPED | OUTPATIENT
Start: 2020-04-29 | End: 2020-05-21 | Stop reason: SDUPTHER

## 2020-05-21 RX ORDER — ALPRAZOLAM 0.5 MG/1
0.5 TABLET ORAL 2 TIMES DAILY PRN
Qty: 60 TABLET | Refills: 0 | Status: SHIPPED | OUTPATIENT
Start: 2020-05-21 | End: 2020-06-19 | Stop reason: SDUPTHER

## 2020-05-21 RX ORDER — ESZOPICLONE 3 MG/1
3 TABLET, FILM COATED ORAL NIGHTLY
Qty: 30 TABLET | Refills: 0 | Status: SHIPPED | OUTPATIENT
Start: 2020-05-21 | End: 2020-06-05 | Stop reason: SDUPTHER

## 2020-05-21 NOTE — TELEPHONE ENCOUNTER
LOV 03/31/20-  Last Rx 04/29/20- Not due until Monday but we are closed she is asking for it to be filled early due to this.    C/J Luis Camacho   She is asking for a refill on Xanax but she would like this to be lowered to the 0.5 mg.

## 2020-05-28 ENCOUNTER — TELEPHONE (OUTPATIENT)
Dept: PRIMARY CARE CLINIC | Age: 45
End: 2020-05-28

## 2020-06-05 ENCOUNTER — OFFICE VISIT (OUTPATIENT)
Dept: PRIMARY CARE CLINIC | Age: 45
End: 2020-06-05
Payer: COMMERCIAL

## 2020-06-05 ENCOUNTER — TELEPHONE (OUTPATIENT)
Dept: PRIMARY CARE CLINIC | Age: 45
End: 2020-06-05

## 2020-06-05 VITALS
OXYGEN SATURATION: 98 % | TEMPERATURE: 98.1 F | BODY MASS INDEX: 20.11 KG/M2 | SYSTOLIC BLOOD PRESSURE: 116 MMHG | DIASTOLIC BLOOD PRESSURE: 72 MMHG | WEIGHT: 124.6 LBS | HEART RATE: 90 BPM

## 2020-06-05 PROCEDURE — 4004F PT TOBACCO SCREEN RCVD TLK: CPT | Performed by: NURSE PRACTITIONER

## 2020-06-05 PROCEDURE — 99214 OFFICE O/P EST MOD 30 MIN: CPT | Performed by: NURSE PRACTITIONER

## 2020-06-05 PROCEDURE — G8427 DOCREV CUR MEDS BY ELIG CLIN: HCPCS | Performed by: NURSE PRACTITIONER

## 2020-06-05 PROCEDURE — G8420 CALC BMI NORM PARAMETERS: HCPCS | Performed by: NURSE PRACTITIONER

## 2020-06-05 RX ORDER — TEMAZEPAM 15 MG/1
15 CAPSULE ORAL NIGHTLY PRN
Qty: 30 CAPSULE | Refills: 0 | Status: SHIPPED | OUTPATIENT
Start: 2020-06-05 | End: 2020-07-02 | Stop reason: SDUPTHER

## 2020-06-05 RX ORDER — FLUTICASONE PROPIONATE 50 MCG
2 SPRAY, SUSPENSION (ML) NASAL DAILY
Qty: 1 BOTTLE | Refills: 1 | Status: SHIPPED | OUTPATIENT
Start: 2020-06-05 | End: 2020-09-01

## 2020-06-05 RX ORDER — ESZOPICLONE 2 MG/1
2 TABLET, FILM COATED ORAL NIGHTLY
Qty: 30 TABLET | Refills: 0 | Status: SHIPPED | OUTPATIENT
Start: 2020-06-05 | End: 2020-06-05

## 2020-06-05 RX ORDER — LIDOCAINE 4 G/G
1 PATCH TOPICAL DAILY
Qty: 30 PATCH | Refills: 1 | Status: SHIPPED | OUTPATIENT
Start: 2020-06-05 | End: 2020-07-05

## 2020-06-05 ASSESSMENT — ENCOUNTER SYMPTOMS
VOMITING: 0
ABDOMINAL DISTENTION: 0
DIARRHEA: 0
RHINORRHEA: 1
NAUSEA: 1
ABDOMINAL PAIN: 1
BACK PAIN: 1
CONSTIPATION: 0

## 2020-06-05 NOTE — PROGRESS NOTES
Outpatient Medications   Medication Sig Dispense Refill    eszopiclone (LUNESTA) 2 MG TABS Take 1 tablet by mouth nightly for 30 days. 30 tablet 0    diclofenac (VOLTAREN) 50 MG EC tablet Take 1 tablet by mouth 3 times daily (with meals) 90 tablet 1    lidocaine 4 % external patch Place 1 patch onto the skin daily 30 patch 1    fluticasone (FLONASE) 50 MCG/ACT nasal spray 2 sprays by Each Nostril route daily 1 Bottle 1    ALPRAZolam (XANAX) 0.5 MG tablet Take 1 tablet by mouth 2 times daily as needed for Sleep or Anxiety for up to 30 days. 60 tablet 0    Chlorpheniramine-Phenylephrine (SINUS & ALLERGY PO) Take by mouth      megestrol (MEGACE) 40 MG/ML suspension Take 10 mLs by mouth daily      butalbital-acetaminophen-caffeine (FIORICET, ESGIC) -40 MG per tablet Take 1 tablet by mouth every 6 hours as needed for Headaches 60 tablet 1    triamcinolone (KENALOG) 0.1 % ointment Apply topically 2 times daily 1 Tube 3     No current facility-administered medications for this visit. Allergies   Allergen Reactions    Diphenhydramine Itching    Sleep Aid [Diphenhydramine Hcl (Sleep)]        Health Maintenance   Topic Date Due    HIV screen  03/27/1988    DTaP/Tdap/Td vaccine (1 - Tdap) 03/27/1992    Cervical cancer screen  03/27/1994    Lipid screen  03/27/2013    Flu vaccine  Completed    Pneumococcal 0-64 years Vaccine  Completed    Hepatitis A vaccine  Aged Out    Hepatitis B vaccine  Aged Out    Hib vaccine  Aged Out    Meningococcal (ACWY) vaccine  Aged Out       Subjective:      Review of Systems   Constitutional: Negative for chills, fatigue and fever. HENT: Positive for congestion and rhinorrhea. Negative for ear pain and nosebleeds. Cardiovascular: Negative for chest pain, palpitations and leg swelling. Gastrointestinal: Positive for abdominal pain and nausea. Negative for abdominal distention, constipation, diarrhea and vomiting.    Genitourinary: Negative for difficulty urinating and dysuria. Musculoskeletal: Positive for back pain. Negative for gait problem. Skin: Negative for rash and wound. Neurological: Negative for dizziness, light-headedness and headaches. Hematological: Negative for adenopathy. Does not bruise/bleed easily. Psychiatric/Behavioral: Positive for sleep disturbance. Negative for dysphoric mood. The patient is nervous/anxious. Objective:     /72   Pulse 90   Temp 98.1 °F (36.7 °C)   Wt 124 lb 9.6 oz (56.5 kg)   LMP 02/26/2018   SpO2 98%   BMI 20.11 kg/m²   Physical Exam  Vitals signs and nursing note reviewed. Constitutional:       Appearance: Normal appearance. HENT:      Head: Normocephalic and atraumatic. Right Ear: External ear normal.      Left Ear: External ear normal.      Nose: Nose normal.   Eyes:      Extraocular Movements: Extraocular movements intact. Conjunctiva/sclera: Conjunctivae normal.   Neck:      Musculoskeletal: Normal range of motion and neck supple. Cardiovascular:      Rate and Rhythm: Normal rate and regular rhythm. Heart sounds: Normal heart sounds. Pulmonary:      Effort: Pulmonary effort is normal.      Breath sounds: Normal breath sounds. Abdominal:      General: Abdomen is flat. Bowel sounds are normal.      Palpations: There is no hepatomegaly or splenomegaly. Tenderness: There is abdominal tenderness in the right lower quadrant and suprapubic area. Musculoskeletal: Normal range of motion. Right lower leg: No edema. Left lower leg: No edema. Skin:     General: Skin is warm and dry. Neurological:      Mental Status: She is alert and oriented to person, place, and time. Motor: No weakness. Psychiatric:         Mood and Affect: Mood normal.         Thought Content:  Thought content normal.         Judgment: Judgment normal.     Controlled substances monitoring: possible medication side effects, risk of tolerance and/or dependence, and alternative

## 2020-06-05 NOTE — PATIENT INSTRUCTIONS
Refer to Dr. Kaylee Connolly, GYN  Lunesta 2 mg nightly as needed for sleep  Lidocaine patch for back pain  Diclofenac 3x/day as needed for pain  Fluticasone for allergies    Patient Education        Functional Ovarian Cyst: Care Instructions  Your Care Instructions     A functional ovarian cyst is a sac that forms on the surface of a woman's ovary during ovulation. The sac holds a maturing egg. Usually the sac goes away after the egg is released. But if the egg is not released, or if the sac closes up after the egg is released, the sac can swell up with fluid and form a cyst.  Functional ovarian cysts are different than ovarian growths caused by other problems, such as cancer. Most functional ovarian cysts cause no symptoms and go away on their own. Some cause mild pain. Others can cause severe pain when they rupture or bleed. Follow-up care is a key part of your treatment and safety. Be sure to make and go to all appointments, and call your doctor if you are having problems. It's also a good idea to know your test results and keep a list of the medicines you take. How can you care for yourself at home? · Use heat, such as a hot water bottle, a heating pad set on low, or a warm bath, to relax tense muscles and relieve cramping. · Be safe with medicines. Take pain medicines exactly as directed. ? If the doctor gave you a prescription medicine for pain, take it as prescribed. ? If you are not taking a prescription pain medicine, ask your doctor if you can take an over-the-counter medicine. · Avoid constipation. Make sure you drink enough fluids and include fruits, vegetables, and fiber in your diet each day. Constipation does not cause ovarian cysts, but it may make your pelvic pain worse. When should you call for help? Call your doctor now or seek immediate medical care if:  · You have severe vaginal bleeding. · You have new or worse belly or pelvic pain.   Watch closely for changes in your health, and be sure to

## 2020-06-19 RX ORDER — ALPRAZOLAM 0.5 MG/1
0.5 TABLET ORAL 2 TIMES DAILY PRN
Qty: 60 TABLET | Refills: 0 | Status: SHIPPED | OUTPATIENT
Start: 2020-06-19 | End: 2020-07-13 | Stop reason: SDUPTHER

## 2020-06-22 ENCOUNTER — OFFICE VISIT (OUTPATIENT)
Dept: OBGYN CLINIC | Age: 45
End: 2020-06-22
Payer: COMMERCIAL

## 2020-06-22 ENCOUNTER — HOSPITAL ENCOUNTER (OUTPATIENT)
Age: 45
Setting detail: SPECIMEN
Discharge: HOME | End: 2020-06-22
Payer: COMMERCIAL

## 2020-06-22 VITALS
DIASTOLIC BLOOD PRESSURE: 80 MMHG | HEIGHT: 66 IN | SYSTOLIC BLOOD PRESSURE: 116 MMHG | TEMPERATURE: 97.5 F | WEIGHT: 122 LBS | BODY MASS INDEX: 19.61 KG/M2

## 2020-06-22 LAB
-: NORMAL
AMORPHOUS: NORMAL
BACTERIA: NORMAL
BILIRUBIN URINE: NEGATIVE
CASTS UA: NORMAL /LPF
COLOR: YELLOW
COMMENT UA: ABNORMAL
CRYSTALS, UA: NORMAL /HPF
DIRECT EXAM: ABNORMAL
EPITHELIAL CELLS UA: NORMAL /HPF
GLUCOSE URINE: NEGATIVE
KETONES, URINE: NEGATIVE
LEUKOCYTE ESTERASE, URINE: ABNORMAL
Lab: ABNORMAL
MUCUS: NORMAL
NITRITE, URINE: NEGATIVE
OTHER OBSERVATIONS UA: NORMAL
PH UA: 5.5 (ref 5–8)
PROTEIN UA: NEGATIVE
RBC UA: NORMAL /HPF
RENAL EPITHELIAL, UA: NORMAL /HPF
SPECIFIC GRAVITY UA: 1.03 (ref 1–1.03)
SPECIMEN DESCRIPTION: ABNORMAL
TRICHOMONAS: NORMAL
TURBIDITY: CLEAR
URINE HGB: NEGATIVE
UROBILINOGEN, URINE: NORMAL
WBC UA: NORMAL /HPF
YEAST: NORMAL

## 2020-06-22 PROCEDURE — 87591 N.GONORRHOEAE DNA AMP PROB: CPT

## 2020-06-22 PROCEDURE — 81001 URINALYSIS AUTO W/SCOPE: CPT

## 2020-06-22 PROCEDURE — 99386 PREV VISIT NEW AGE 40-64: CPT | Performed by: NURSE PRACTITIONER

## 2020-06-22 PROCEDURE — 87491 CHLMYD TRACH DNA AMP PROBE: CPT

## 2020-06-22 PROCEDURE — 87480 CANDIDA DNA DIR PROBE: CPT

## 2020-06-22 PROCEDURE — 87510 GARDNER VAG DNA DIR PROBE: CPT

## 2020-06-22 PROCEDURE — 87660 TRICHOMONAS VAGIN DIR PROBE: CPT

## 2020-06-22 ASSESSMENT — PATIENT HEALTH QUESTIONNAIRE - PHQ9
SUM OF ALL RESPONSES TO PHQ9 QUESTIONS 1 & 2: 0
SUM OF ALL RESPONSES TO PHQ QUESTIONS 1-9: 0
2. FEELING DOWN, DEPRESSED OR HOPELESS: 0
SUM OF ALL RESPONSES TO PHQ QUESTIONS 1-9: 0
1. LITTLE INTEREST OR PLEASURE IN DOING THINGS: 0

## 2020-06-22 NOTE — PROGRESS NOTES
Take 1 capsule by mouth nightly as needed for Sleep for up to 30 days. 30 capsule 0    Chlorpheniramine-Phenylephrine (SINUS & ALLERGY PO) Take by mouth      megestrol (MEGACE) 40 MG/ML suspension Take 10 mLs by mouth daily      butalbital-acetaminophen-caffeine (FIORICET, ESGIC) -40 MG per tablet Take 1 tablet by mouth every 6 hours as needed for Headaches 60 tablet 1    triamcinolone (KENALOG) 0.1 % ointment Apply topically 2 times daily 1 Tube 3     No current facility-administered medications for this visit. ALLERGIES:  Allergies as of 06/22/2020 - Review Complete 06/22/2020   Allergen Reaction Noted    Diphenhydramine Itching 06/01/2018    Sleep aid [diphenhydramine hcl (sleep)]  12/30/2019       Symptoms of decreased mood absent  Symptoms of anhedonia absent    **If either question is answered in a  positive fashion then complete the PHQ9 Scoring Evaluation and make the appropriate referral**      Immunization status: stated as current, but no records available. Gynecologic History:  Menarche: 13 yo  Menopause at [address] [facility] yo     Patient's last menstrual period was 02/26/2018. Sexually Active: Yes    STD History: No     Permanent Sterilization: Yes hysterectomy   Reversible Birth Control: No        Hormone Replacement Exposure: No      Genetic Qualified Family History of Breast, Ovarian , Colon or Uterine Cancer: No     If YES see scanned worksheet.     Preventative Health Testing:    Health Maintenance:  Health Maintenance Due   Topic Date Due    HIV screen  03/27/1988    DTaP/Tdap/Td vaccine (1 - Tdap) 03/27/1992    Cervical cancer screen  03/27/1994    Lipid screen  03/27/2013       Date of Last Pap Smear: 2 years ago- normal- per patient  Abnormal Pap Smear History: denies  Colposcopy History:   Date of Last Mammogram: 3/21/2018 negative  Date of Last Colonoscopy:   Date of Last Bone Density:      ________________________________________________________________________        REVIEW OF SYSTEMS:    yes   A minimum of an eleven point review of systems was completed. Review Of Systems (11 point):  Constitutional: No fever, chills or malaise; No weight change or fatigue  Head and Eyes: No vision, Headache, Dizziness or trauma in last 12 months  ENT ROS: No hearing, Tinnitis, sinus or taste problems  Hematological and Lymphatic ROS:No Lymphoma, Von Willebrand's, Hemophillia or Bleeding History  Psych ROS: No Depression, Homicidal thoughts,suicidal thoughts, or anxiety  Breast ROS: No prior breast abnormalities or lumps  Respiratory ROS: No SOB, Pneumoniae,Cough, or Pulmonary Embolism History  Cardiovascular ROS: No Chest Pain with Exertion, Palpitations, Syncope, Edema, Arrhythmia  Gastrointestinal ROS: No Indigestion, Heartburn, Nausea, vomiting, Diarrhea, Constipation,or Bowel Changes; No Bloody Stools or melena  Genito-Urinary ROS: No Dysuria, Hematuria or Nocturia. No Urinary Incontinence or Vaginal Discharge. + pelvic pain  Musculoskeletal ROS: No Arthralgia, Arthritis,Gout,Osteoporosis or Rheumatism  Neurological ROS: No CVA, Migraines, Epilepsy, Seizure Hx, or Limb Weakness  Dermatological ROS: No Rash, Itching, Hives, Mole Changes or Cancer                                                                                                                                                                                                                                  PHYSICAL Exam:     Constitutional:  Vitals:    06/22/20 1440   BP: 116/80   Site: Right Upper Arm   Position: Sitting   Cuff Size: Medium Adult   Temp: 97.5 °F (36.4 °C)   Weight: 122 lb (55.3 kg)   Height: 5' 6\" (1.676 m)         General Appearance: This  is a well Developed, well Nourished, well groomed female. Her BMI was reviewed. Nutritional decision making was discussed.     Skin:  There was a Normal Inspection of the skin without rashes or lesions. There were no rashes. (Papular, Maculopapular, Hives, Pustular, Macular)     There were no lesions (Ulcers, Erythema, Abn. Appearing Nevi)            Lymphatic:  No Lymph Nodes were Palpable in the neck , axilla or groin.  0 # Of Lymph Nodes; Location ; Character [Normal]  [Shotty] [Tender] [Enlarged]     Neck and EENT:  The neck was supple. There were no masses   The thyroid was not enlarged and had no masses. Perrla, EOMI B/L, TMI B/L No Abnormalities. Throat inspected-No exudates or Masses, Nares Patent No Masses        Respiratory: The lungs were auscultated and found to be clear. There were no rales, rhonchi or wheezes. There was a good respiratory effort. Cardiovascular: The heart was in a regular rate and rhythm. . No S3 or S4. There was no murmur appreciated. Location, grade, and radiation are not applicable. Extremities: The patients extremities were without calf tenderness, edema, or varicosities. There was full range of motion in all four extremities. Pulses in all four extremities were appreciated and are 2/4. Abdomen: The abdomen was soft and non-tender. There were good bowel sounds in all quadrants and there was no guarding, rebound or rigidity. On evaluation there was no evidence of hepatosplenomegaly and there was no costal vertebral gloria tenderness bilaterally. No hernias were appreciated. Abdominal Scars: C/W previous surgeries    Psych: The patient had a normal Orientation to: Time, Place, Person, and Situation  There is no Mood / Affect changes    Breast:  (Chest)  normal appearance, no masses or tenderness  Self breast exams were reviewed in detail. Literature was given. Pelvic Exam:  Vulva and vagina appear normal. Bimanual exam reveals normal cuff intact. Rectal Exam:  exam declined by patient          Musculosk:  Normal Gait and station was noted. Digits were evaluated without abnormal findings.   Range of motion, stability and smears. Vaginal cultures and UA C&S obtained. Lab slips given for [facility]. Mammogram ordered. Repeat Annual every 1 year  Cervical Cytology Evaluation begins at 24years old. If Negative Cytology, Follow-up screening per current guidelines. Mammograms every 1 year. If 35 yo and last mammogram was negative. Calcium and Vitamin D dosing reviewed. Colonoscopy screening reviewed as well as onset for bone density testing. Birth control and barrier recommendations discussed. STD counseling and prevention reviewed. Gardisil counseling completed for all patients 10-35 yo. Routine health maintenance per patients PCP. Orders Placed This Encounter   Procedures    VAGINITIS DNA PROBE     Standing Status:   Future     Standing Expiration Date:   6/22/2021    C.trachomatis N.gonorrhoeae DNA     Standing Status:   Future     Standing Expiration Date:   7/22/2020     Order Specific Question:   Source: Answer:   Vaginal    ROXY DIGITAL SCREEN W CAD BILATERAL     Standing Status:   Future     Standing Expiration Date:   6/18/2021     Order Specific Question:   Reason for exam:     Answer:   SCREENING-PREVENTATIVE    US Pelvis Complete     Standing Status:   Future     Standing Expiration Date:   9/22/2020     Order Specific Question:   Reason for exam:     Answer:   pelvic pain    US Non OB Transvaginal     Standing Status:   Future     Standing Expiration Date:   12/22/2020     Order Specific Question:   Reason for exam:     Answer:   pelvic pain    Urinalysis Reflex to Culture     Standing Status:   Future     Standing Expiration Date:   6/22/2021     Order Specific Question:   SPECIFY(EX-CATH,MIDSTREAM,CYSTO,ETC)?      Answer:   midstream    Follicle Stimulating Hormone     Standing Status:   Future     Standing Expiration Date:   6/22/2021    Estradiol     Standing Status:   Future     Standing Expiration Date:   6/22/2021

## 2020-06-23 ENCOUNTER — TELEPHONE (OUTPATIENT)
Dept: OBGYN CLINIC | Age: 45
End: 2020-06-23

## 2020-06-23 ENCOUNTER — HOSPITAL ENCOUNTER (OUTPATIENT)
Age: 45
Setting detail: SPECIMEN
Discharge: HOME | End: 2020-06-23
Payer: COMMERCIAL

## 2020-06-23 LAB
C TRACH DNA GENITAL QL NAA+PROBE: NEGATIVE
ESTRADIOL LEVEL: <5 PG/ML (ref 27–314)
FOLLICLE STIMULATING HORMONE: 119.5 U/L (ref 1.7–21.5)
N. GONORRHOEAE DNA: NEGATIVE
SPECIMEN DESCRIPTION: NORMAL

## 2020-06-23 PROCEDURE — 82670 ASSAY OF TOTAL ESTRADIOL: CPT

## 2020-06-23 PROCEDURE — 83001 ASSAY OF GONADOTROPIN (FSH): CPT

## 2020-06-23 PROCEDURE — 36415 COLL VENOUS BLD VENIPUNCTURE: CPT

## 2020-06-23 RX ORDER — METRONIDAZOLE 500 MG/1
500 TABLET ORAL 2 TIMES DAILY
Qty: 14 TABLET | Refills: 0 | Status: SHIPPED | OUTPATIENT
Start: 2020-06-23 | End: 2020-06-30

## 2020-07-02 ENCOUNTER — TELEPHONE (OUTPATIENT)
Dept: PRIMARY CARE CLINIC | Age: 45
End: 2020-07-02

## 2020-07-02 RX ORDER — TEMAZEPAM 22.5 MG/1
22.5 CAPSULE ORAL NIGHTLY PRN
Qty: 30 CAPSULE | Refills: 0 | Status: SHIPPED | OUTPATIENT
Start: 2020-07-02 | End: 2020-07-06 | Stop reason: DRUGHIGH

## 2020-07-02 NOTE — TELEPHONE ENCOUNTER
Pt asking to increase dose of restoril due to not being able to sleep. States she is only getting 4-5 hrs sleep per night.  Pharm [facility] [place]

## 2020-07-06 ENCOUNTER — TELEPHONE (OUTPATIENT)
Dept: PRIMARY CARE CLINIC | Age: 45
End: 2020-07-06

## 2020-07-06 RX ORDER — TEMAZEPAM 30 MG/1
30 CAPSULE ORAL NIGHTLY PRN
Qty: 30 CAPSULE | Refills: 0 | Status: SHIPPED | OUTPATIENT
Start: 2020-07-06 | End: 2020-07-13 | Stop reason: ALTCHOICE

## 2020-07-06 NOTE — TELEPHONE ENCOUNTER
Call and make sure the 22.5 Restoril is cancelled. Will send in 30 mg at HS. For thehe pain she will have to wait for Clarence to come back next week or talk to her gyn about this.

## 2020-07-06 NOTE — TELEPHONE ENCOUNTER
Pt states the restoril 22.5 mg is not covered by insurance but 15 and 30 mg are. Also, asking for something stronger for ovarian pain. States voltaren isn't helping.

## 2020-07-13 ENCOUNTER — OFFICE VISIT (OUTPATIENT)
Dept: PRIMARY CARE CLINIC | Age: 45
End: 2020-07-13
Payer: COMMERCIAL

## 2020-07-13 VITALS
SYSTOLIC BLOOD PRESSURE: 128 MMHG | BODY MASS INDEX: 19.53 KG/M2 | TEMPERATURE: 99.5 F | DIASTOLIC BLOOD PRESSURE: 80 MMHG | WEIGHT: 121 LBS | HEART RATE: 85 BPM | OXYGEN SATURATION: 97 %

## 2020-07-13 LAB
AMPHETAMINE SCREEN, URINE: NORMAL
BARBITURATE SCREEN, URINE: NORMAL
BENZODIAZEPINE SCREEN, URINE: NORMAL
BUPRENORPHINE URINE: NORMAL
COCAINE METABOLITE SCREEN URINE: NORMAL
GABAPENTIN SCREEN, URINE: NORMAL
MDMA URINE: NORMAL
METHADONE SCREEN, URINE: NORMAL
METHAMPHETAMINE, URINE: NORMAL
OPIATE SCREEN URINE: NORMAL
OXYCODONE SCREEN URINE: NORMAL
PHENCYCLIDINE SCREEN URINE: NORMAL
PROPOXYPHENE SCREEN, URINE: NORMAL
THC SCREEN, URINE: NORMAL
TRICYCLIC ANTIDEPRESSANTS, UR: NORMAL

## 2020-07-13 PROCEDURE — G8420 CALC BMI NORM PARAMETERS: HCPCS | Performed by: NURSE PRACTITIONER

## 2020-07-13 PROCEDURE — 4004F PT TOBACCO SCREEN RCVD TLK: CPT | Performed by: NURSE PRACTITIONER

## 2020-07-13 PROCEDURE — 99214 OFFICE O/P EST MOD 30 MIN: CPT | Performed by: NURSE PRACTITIONER

## 2020-07-13 PROCEDURE — G8427 DOCREV CUR MEDS BY ELIG CLIN: HCPCS | Performed by: NURSE PRACTITIONER

## 2020-07-13 PROCEDURE — 80305 DRUG TEST PRSMV DIR OPT OBS: CPT | Performed by: NURSE PRACTITIONER

## 2020-07-13 RX ORDER — ALPRAZOLAM 0.5 MG/1
0.5 TABLET ORAL 2 TIMES DAILY PRN
Qty: 60 TABLET | Refills: 0 | Status: SHIPPED | OUTPATIENT
Start: 2020-07-13 | End: 2020-07-15 | Stop reason: SDUPTHER

## 2020-07-13 RX ORDER — ACETAMINOPHEN AND CODEINE PHOSPHATE 300; 30 MG/1; MG/1
1 TABLET ORAL EVERY 6 HOURS PRN
Qty: 20 TABLET | Refills: 0 | Status: SHIPPED | OUTPATIENT
Start: 2020-07-13 | End: 2020-07-18

## 2020-07-13 RX ORDER — ZALEPLON 5 MG/1
5 CAPSULE ORAL NIGHTLY PRN
Qty: 30 CAPSULE | Refills: 0 | Status: SHIPPED | OUTPATIENT
Start: 2020-07-13 | End: 2020-08-07 | Stop reason: ALTCHOICE

## 2020-07-13 ASSESSMENT — ENCOUNTER SYMPTOMS
BACK PAIN: 1
CONSTIPATION: 0
DIARRHEA: 0
NAUSEA: 1

## 2020-07-13 NOTE — PROGRESS NOTES
Brennan Lat) 0.5 MG tablet Take 1 tablet by mouth 2 times daily as needed for Sleep or Anxiety for up to 30 days. 60 tablet 0    zaleplon (SONATA) 5 MG capsule Take 1 capsule by mouth nightly as needed (insmonia) for up to 30 days. 30 capsule 0    acetaminophen-codeine (TYLENOL/CODEINE #3) 300-30 MG per tablet Take 1 tablet by mouth every 6 hours as needed for Pain for up to 5 days. Intended supply: 3 days. Take lowest dose possible to manage pain 20 tablet 0    diclofenac (VOLTAREN) 50 MG EC tablet Take 1 tablet by mouth 3 times daily (with meals) (Patient taking differently: Take 50 mg by mouth 3 times daily (with meals) Viryher Bermudez it is not working.) 90 tablet 1    fluticasone (FLONASE) 50 MCG/ACT nasal spray 2 sprays by Each Nostril route daily 1 Bottle 1    megestrol (MEGACE) 40 MG/ML suspension Take 10 mLs by mouth daily      triamcinolone (KENALOG) 0.1 % ointment Apply topically 2 times daily 1 Tube 3    Chlorpheniramine-Phenylephrine (SINUS & ALLERGY PO) Take by mouth      butalbital-acetaminophen-caffeine (FIORICET, ESGIC) -40 MG per tablet Take 1 tablet by mouth every 6 hours as needed for Headaches (Patient not taking: Reported on 7/13/2020) 60 tablet 1     No current facility-administered medications for this visit. Allergies   Allergen Reactions    Diphenhydramine Itching    Sleep Aid [Diphenhydramine Hcl (Sleep)]        Health Maintenance   Topic Date Due    HIV screen  03/27/1988    DTaP/Tdap/Td vaccine (1 - Tdap) 03/27/1992    Cervical cancer screen  03/27/1994    Lipid screen  03/27/2013    Flu vaccine (1) 09/01/2020    Pneumococcal 0-64 years Vaccine  Completed    Hepatitis A vaccine  Aged Out    Hepatitis B vaccine  Aged Out    Hib vaccine  Aged Out    Meningococcal (ACWY) vaccine  Aged Out       Subjective:      Review of Systems   Constitutional: Positive for fatigue. Negative for chills and fever. HENT: Negative for congestion and ear pain.     Eyes: Negative for pain and redness. Respiratory: Negative for cough and shortness of breath. Cardiovascular: Negative for chest pain, palpitations and leg swelling. Gastrointestinal: Positive for nausea. Negative for constipation and diarrhea. Endocrine: Negative for polydipsia, polyphagia and polyuria. Genitourinary: Positive for pelvic pain. Negative for frequency and hematuria. Musculoskeletal: Positive for back pain. Skin: Negative for rash and wound. Neurological: Negative for dizziness, light-headedness and headaches. Psychiatric/Behavioral: Positive for sleep disturbance. Negative for dysphoric mood. The patient is nervous/anxious. Objective:     /80   Pulse 85   Temp 99.5 °F (37.5 °C)   Wt 121 lb (54.9 kg)   LMP 02/26/2018   SpO2 97%   BMI 19.53 kg/m²   Physical Exam  Vitals signs and nursing note reviewed. HENT:      Head: Normocephalic and atraumatic. Eyes:      Extraocular Movements: Extraocular movements intact. Pupils: Pupils are equal, round, and reactive to light. Neck:      Musculoskeletal: Normal range of motion and neck supple. Cardiovascular:      Rate and Rhythm: Regular rhythm. Heart sounds: Normal heart sounds. Abdominal:      General: Abdomen is flat. Bowel sounds are normal.      Palpations: Abdomen is soft. Tenderness: There is abdominal tenderness in the right lower quadrant and suprapubic area. Skin:     General: Skin is warm and dry. Capillary Refill: Capillary refill takes less than 2 seconds. Neurological:      Mental Status: She is alert and oriented to person, place, and time. Cranial Nerves: No cranial nerve deficit. Psychiatric:         Mood and Affect: Mood normal.         Thought Content:  Thought content normal.         Judgment: Judgment normal.       Controlled substances monitoring: possible medication side effects, risk of tolerance and/or dependence, and alternative treatments discussed, no signs of potential drug abuse or diversion identified, OARRS report reviewed today- activity consistent with treatment plan, medication contract signed today and random urine drug screen sent today. Assessment:       Diagnosis Orders   1. Primary insomnia  zaleplon (SONATA) 5 MG capsule   2. Anxiety  ALPRAZolam (XANAX) 0.5 MG tablet   3. Medication management  ALPRAZolam (XANAX) 0.5 MG tablet    zaleplon (SONATA) 5 MG capsule   4. Pelvic pain  acetaminophen-codeine (TYLENOL/CODEINE #3) 300-30 MG per tablet   5. High risk medication use  POCT Rapid Drug Screen        Plan:     Zaleplon 5 mg as needed at night for sleep  Acetaminophen/Codeine 300/30 mg as needed for pelvic pain  Alprazolam as needed for anxiety. Return in about 3 months (around 10/13/2020) for Zaleplon . Orders Placed This Encounter   Procedures    POCT Rapid Drug Screen     Orders Placed This Encounter   Medications    ALPRAZolam (XANAX) 0.5 MG tablet     Sig: Take 1 tablet by mouth 2 times daily as needed for Sleep or Anxiety for up to 30 days. Dispense:  60 tablet     Refill:  0    zaleplon (SONATA) 5 MG capsule     Sig: Take 1 capsule by mouth nightly as needed (insmonia) for up to 30 days. Dispense:  30 capsule     Refill:  0    acetaminophen-codeine (TYLENOL/CODEINE #3) 300-30 MG per tablet     Sig: Take 1 tablet by mouth every 6 hours as needed for Pain for up to 5 days. Intended supply: 3 days. Take lowest dose possible to manage pain     Dispense:  20 tablet     Refill:  0     Reduce doses taken as pain becomes manageable       Patient given educationalmaterials - see patient instructions. Discussed use, benefit, and side effectsof prescribed medications. All patient questions answered. Pt voiced understanding. Reviewed health maintenance. Instructed to continue current medications, diet andexercise. Patient agreed with treatment plan. Follow up as directed.      Electronicallysigned by ANITA Lewis CNP on 7/13/2020 at 5:12 PM

## 2020-07-13 NOTE — PATIENT INSTRUCTIONS
Zaleplon 5 mg as needed at night for sleep  Acetaminophen/Codeine 300/30 mg as needed for pelvic pain  Alprazolam as needed for anxiety.

## 2020-07-14 ASSESSMENT — ENCOUNTER SYMPTOMS
SHORTNESS OF BREATH: 0
COUGH: 0
EYE REDNESS: 0
EYE PAIN: 0

## 2020-07-15 ENCOUNTER — TELEPHONE (OUTPATIENT)
Dept: PRIMARY CARE CLINIC | Age: 45
End: 2020-07-15

## 2020-07-15 RX ORDER — ALPRAZOLAM 1 MG/1
1 TABLET ORAL 2 TIMES DAILY PRN
Qty: 60 TABLET | Refills: 0 | Status: SHIPPED | OUTPATIENT
Start: 2020-07-15 | End: 2020-08-12 | Stop reason: SDUPTHER

## 2020-07-15 NOTE — TELEPHONE ENCOUNTER
Patient calling and states that she is super irritable, ready to \"bite peoples heads off\" and high anxiety and she tried to get her ALPRAZolam Christa Pennant) 0.5 MG tablet ut It can not be filled until the 18th so she is asking to have increased to 1 mg so she can get filled. Please advise.

## 2020-07-22 ENCOUNTER — OFFICE VISIT (OUTPATIENT)
Dept: OBGYN CLINIC | Age: 45
End: 2020-07-22
Payer: COMMERCIAL

## 2020-07-22 PROCEDURE — 76830 TRANSVAGINAL US NON-OB: CPT | Performed by: OBSTETRICS & GYNECOLOGY

## 2020-07-22 PROCEDURE — 76856 US EXAM PELVIC COMPLETE: CPT | Performed by: OBSTETRICS & GYNECOLOGY

## 2020-07-28 ENCOUNTER — OFFICE VISIT (OUTPATIENT)
Dept: OBGYN CLINIC | Age: 45
End: 2020-07-28
Payer: COMMERCIAL

## 2020-07-28 VITALS
TEMPERATURE: 98.1 F | DIASTOLIC BLOOD PRESSURE: 78 MMHG | BODY MASS INDEX: 19.29 KG/M2 | SYSTOLIC BLOOD PRESSURE: 126 MMHG | WEIGHT: 120 LBS | HEART RATE: 96 BPM | HEIGHT: 66 IN | RESPIRATION RATE: 18 BRPM

## 2020-07-28 PROCEDURE — G8420 CALC BMI NORM PARAMETERS: HCPCS | Performed by: OBSTETRICS & GYNECOLOGY

## 2020-07-28 PROCEDURE — G8427 DOCREV CUR MEDS BY ELIG CLIN: HCPCS | Performed by: OBSTETRICS & GYNECOLOGY

## 2020-07-28 PROCEDURE — 4004F PT TOBACCO SCREEN RCVD TLK: CPT | Performed by: OBSTETRICS & GYNECOLOGY

## 2020-07-28 PROCEDURE — 99214 OFFICE O/P EST MOD 30 MIN: CPT | Performed by: OBSTETRICS & GYNECOLOGY

## 2020-07-28 NOTE — PROGRESS NOTES
Jose Levin  7/28/2020      Jose Levin is a 50 y.o. female [id number]      The patient was seen today. She was here to follow-up regarding her labs and diagnostics ordered at her last visit for the diagnosis of:    ICD-10-CM    1. Pelvic pain  R10.2        Her bowels are regular and she is voiding without difficulty. Pt states symptoms are affecting her wor ADL's. Wishes ovary removed. Counseled pt on the ovary may not be causing the problem. I reviewed the L-Scope procedure she states ok to perform X-Lap. Ovary not identified on ultrasound. I reviewed I may not remove anything pending findings. Gen surgery referral for possible Lap-Appy      Past Medical History:   Diagnosis Date    Endometriosis     Fibroid tumor 2017    Ovarian cyst          Past Surgical History:   Procedure Laterality Date    BREAST SURGERY  2004    lumpectomy of the L     HYSTERECTOMY      cervix was taken         Family History   Problem Relation Age of Onset    Colon Cancer Paternal Grandfather          Social History     Tobacco Use    Smoking status: Current Every Day Smoker     Packs/day: 0.50     Years: 8.00     Pack years: 4.00     Types: Cigarettes    Smokeless tobacco: Never Used   Substance Use Topics    Alcohol use: No     Comment: social    Drug use: Yes     Frequency: 7.0 times per week     Types: Marijuana         MEDICATIONS:  Current Outpatient Medications   Medication Sig Dispense Refill    ALPRAZolam (XANAX) 1 MG tablet Take 1 tablet by mouth 2 times daily as needed for Sleep or Anxiety for up to 30 days. 60 tablet 0    zaleplon (SONATA) 5 MG capsule Take 1 capsule by mouth nightly as needed (insmonia) for up to 30 days.  30 capsule 0    diclofenac (VOLTAREN) 50 MG EC tablet Take 1 tablet by mouth 3 times daily (with meals) (Patient taking differently: Take 50 mg by mouth 3 times daily (with meals) Laura Carnes it is not working.) 90 tablet 1    fluticasone (FLONASE) 50 MCG/ACT nasal spray 2 sprays by Each The Right Ovary is not seen on today's study 4. There is not an abnormal amount of cul-de-sac fluid Electronically signed by Joseph Howell DO on 7/23/20 at 2:34 PM EDT     1. The Uterus is surgically absent by the patient's verbal history and is not imaged on today's study 2. The Left Ovary is surgically absent per the patient's verbal history and is not visualized on today's study 3. The Right Ovary is not seen on today's study 4.  There is not an abnormal amount of cul-de-sac fluid    Us Pelvis Complete    Result Date: 7/22/2020  See transvaginal report from same day        Lab Results:  Results for orders placed or performed in visit on 07/13/20   POCT Rapid Drug Screen   Result Value Ref Range    Amphetamine Screen, Urine NEG     Barbiturate Screen, Urine NEG     Benzodiazepine Screen, Urine POS     Buprenorphine Urine NEG     Cocaine Metabolite Screen, Urine NEG     Gabapentin Screen, Urine NEG     MDMA, Urine NEG     Methamphetamine, Urine NEG     Methadone Screen, Urine NEG     Opiate Scrn, Ur NEG     Oxycodone Screen, Ur NEG     PCP Screen, Urine NEG     Propoxyphene Screen, Urine NEG     THC Screen, Urine NEG     Tricyclic Antidepressants, Urine NEG        Office Visit on 07/13/2020   Component Date Value Ref Range Status    Amphetamine Screen, Urine 07/13/2020 NEG   Final    Barbiturate Screen, Urine 07/13/2020 NEG   Final    Benzodiazepine Screen, Urine 07/13/2020 POS   Final    Buprenorphine Urine 07/13/2020 NEG   Final    Cocaine Metabolite Screen, Urine 07/13/2020 NEG   Final    Gabapentin Screen, Urine 07/13/2020 NEG   Final    MDMA, Urine 07/13/2020 NEG   Final    Methamphetamine, Urine 07/13/2020 NEG   Final    Methadone Screen, Urine 07/13/2020 NEG   Final    Opiate Scrn, Ur 07/13/2020 NEG   Final    Oxycodone Screen, Ur 07/13/2020 NEG   Final    PCP Screen, Urine 07/13/2020 NEG   Final    Propoxyphene Screen, Urine 07/13/2020 NEG   Final    THC Screen, Urine 07/13/2020 NEG diagnosis of C. trachomatis and N. gonorrhoeae infections. Per 2014  CDC recommendations, this test does not include confirmation of positive results   by an alternative nucleic acid target.  Specimen Description 06/22/2020 . VAGINA   Final    Special Requests 06/22/2020 NOT REPORTED   Final    Direct Exam 06/22/2020 POSITIVE for Gardnerella vaginalis. *  Final    Direct Exam 06/22/2020 NEGATIVE for Trichomonas vaginalis   Final    Direct Exam 06/22/2020 NEGATIVE for Candida sp. Final    Direct Exam 06/22/2020 Method of testing is a DNA probe intended for detection and identification of Candida species, Gardnerella vaginalis, and Trichomonas vaginalis nucleic acid in vaginal fluid specimens from patients with symptoms of vaginitis/vaginosis.    Final    Color, UA 06/22/2020 YELLOW  YELLOW Final    Turbidity UA 06/22/2020 CLEAR  CLEAR Final    Glucose, Ur 06/22/2020 NEGATIVE  NEGATIVE Final    Bilirubin Urine 06/22/2020 NEGATIVE  NEGATIVE Final    Ketones, Urine 06/22/2020 NEGATIVE  NEGATIVE Final    Specific [place], UA 06/22/2020 1.026  1.000 - 1.030 Final    Urine Hgb 06/22/2020 NEGATIVE  NEGATIVE Final    pH, UA 06/22/2020 5.5  5.0 - 8.0 Final    Protein, UA 06/22/2020 NEGATIVE  NEGATIVE Final    Urobilinogen, Urine 06/22/2020 Normal  Normal Final    Nitrite, Urine 06/22/2020 NEGATIVE  NEGATIVE Final    Leukocyte Esterase, Urine 06/22/2020 SMALL* NEGATIVE Final    Urinalysis Comments 06/22/2020 NOT REPORTED   Final    - 06/22/2020        Final    WBC, UA 06/22/2020 5 TO 10  /HPF Final    RBC, UA 06/22/2020 2 TO 5  /HPF Final    Casts UA 06/22/2020 NOT REPORTED  /LPF Final    Crystals, UA 06/22/2020 NOT REPORTED  None /HPF Final    Epithelial Cells UA 06/22/2020 NOT REPORTED  /HPF Final    Renal Epithelial, UA 06/22/2020 NOT REPORTED  0 /HPF Final    Bacteria, UA 06/22/2020 NOT REPORTED  None Final    Mucus, UA 06/22/2020 NOT REPORTED  None Final    Trichomonas, UA 06/22/2020 NOT

## 2020-08-07 ENCOUNTER — OFFICE VISIT (OUTPATIENT)
Dept: PRIMARY CARE CLINIC | Age: 45
End: 2020-08-07
Payer: COMMERCIAL

## 2020-08-07 VITALS
DIASTOLIC BLOOD PRESSURE: 84 MMHG | WEIGHT: 124.8 LBS | BODY MASS INDEX: 20.14 KG/M2 | SYSTOLIC BLOOD PRESSURE: 126 MMHG | HEART RATE: 90 BPM | TEMPERATURE: 97.5 F | OXYGEN SATURATION: 98 %

## 2020-08-07 PROCEDURE — G8420 CALC BMI NORM PARAMETERS: HCPCS | Performed by: NURSE PRACTITIONER

## 2020-08-07 PROCEDURE — G8427 DOCREV CUR MEDS BY ELIG CLIN: HCPCS | Performed by: NURSE PRACTITIONER

## 2020-08-07 PROCEDURE — 4004F PT TOBACCO SCREEN RCVD TLK: CPT | Performed by: NURSE PRACTITIONER

## 2020-08-07 PROCEDURE — 80305 DRUG TEST PRSMV DIR OPT OBS: CPT | Performed by: NURSE PRACTITIONER

## 2020-08-07 PROCEDURE — 99214 OFFICE O/P EST MOD 30 MIN: CPT | Performed by: NURSE PRACTITIONER

## 2020-08-07 RX ORDER — TRAMADOL HYDROCHLORIDE 50 MG/1
50 TABLET ORAL EVERY 8 HOURS PRN
Qty: 20 TABLET | Refills: 0 | Status: SHIPPED | OUTPATIENT
Start: 2020-08-07 | End: 2020-08-27 | Stop reason: SDUPTHER

## 2020-08-07 RX ORDER — ACETAMINOPHEN AND CODEINE PHOSPHATE 300; 30 MG/1; MG/1
1 TABLET ORAL EVERY 6 HOURS PRN
Qty: 20 TABLET | Refills: 0 | OUTPATIENT
Start: 2020-08-07 | End: 2020-08-12

## 2020-08-07 RX ORDER — TRAZODONE HYDROCHLORIDE 100 MG/1
100 TABLET ORAL NIGHTLY
Qty: 30 TABLET | Refills: 0 | Status: SHIPPED | OUTPATIENT
Start: 2020-08-07 | End: 2020-09-03 | Stop reason: SDUPTHER

## 2020-08-07 ASSESSMENT — ENCOUNTER SYMPTOMS
EYE REDNESS: 0
SHORTNESS OF BREATH: 0
SORE THROAT: 0
BACK PAIN: 0
CONSTIPATION: 0
NAUSEA: 0
EYE PAIN: 0
COUGH: 1
DIARRHEA: 0

## 2020-08-07 NOTE — PATIENT INSTRUCTIONS
DC insomnia  Decrease Mountain Dew with caffeine  Start trazodone 100 mg nightly  Tramadol 50 mg for pain as needed  Follow up with Dr. Mindy Peters and Dr. Piotr Lowe as scheduled  Stop zaleplon and tylenol #3
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
## 2020-08-13 RX ORDER — ALPRAZOLAM 1 MG/1
1 TABLET ORAL 2 TIMES DAILY PRN
Qty: 60 TABLET | Refills: 0 | Status: SHIPPED | OUTPATIENT
Start: 2020-08-13 | End: 2020-09-10 | Stop reason: SDUPTHER

## 2020-08-27 RX ORDER — TRAMADOL HYDROCHLORIDE 50 MG/1
50 TABLET ORAL EVERY 8 HOURS PRN
Qty: 20 TABLET | Refills: 0 | Status: SHIPPED | OUTPATIENT
Start: 2020-08-27 | End: 2020-09-03

## 2020-09-01 ENCOUNTER — OFFICE VISIT (OUTPATIENT)
Dept: OBGYN CLINIC | Age: 45
End: 2020-09-01
Payer: COMMERCIAL

## 2020-09-01 ENCOUNTER — PREP FOR PROCEDURE (OUTPATIENT)
Dept: OBGYN CLINIC | Age: 45
End: 2020-09-01

## 2020-09-01 VITALS
HEIGHT: 66 IN | BODY MASS INDEX: 20.25 KG/M2 | WEIGHT: 126 LBS | SYSTOLIC BLOOD PRESSURE: 112 MMHG | TEMPERATURE: 98.1 F | DIASTOLIC BLOOD PRESSURE: 70 MMHG | HEART RATE: 72 BPM

## 2020-09-01 DIAGNOSIS — Z01.818 PREOP TESTING: Primary | ICD-10-CM

## 2020-09-01 PROBLEM — R10.2 PELVIC PAIN: Status: ACTIVE | Noted: 2020-09-01

## 2020-09-01 PROBLEM — Z90.710 HX OF HYSTERECTOMY: Status: ACTIVE | Noted: 2020-09-01

## 2020-09-01 PROBLEM — N80.30 ENDOMETRIOSIS OF PELVIC PERITONEUM: Status: ACTIVE | Noted: 2020-09-01

## 2020-09-01 PROCEDURE — G8420 CALC BMI NORM PARAMETERS: HCPCS | Performed by: OBSTETRICS & GYNECOLOGY

## 2020-09-01 PROCEDURE — G8427 DOCREV CUR MEDS BY ELIG CLIN: HCPCS | Performed by: OBSTETRICS & GYNECOLOGY

## 2020-09-01 PROCEDURE — 4004F PT TOBACCO SCREEN RCVD TLK: CPT | Performed by: OBSTETRICS & GYNECOLOGY

## 2020-09-01 PROCEDURE — 99213 OFFICE O/P EST LOW 20 MIN: CPT | Performed by: OBSTETRICS & GYNECOLOGY

## 2020-09-01 RX ORDER — SODIUM CHLORIDE 0.9 % (FLUSH) 0.9 %
10 SYRINGE (ML) INJECTION EVERY 12 HOURS SCHEDULED
Status: CANCELLED | OUTPATIENT
Start: 2020-09-01

## 2020-09-01 RX ORDER — SODIUM CHLORIDE, SODIUM LACTATE, POTASSIUM CHLORIDE, CALCIUM CHLORIDE 600; 310; 30; 20 MG/100ML; MG/100ML; MG/100ML; MG/100ML
INJECTION, SOLUTION INTRAVENOUS CONTINUOUS
Status: CANCELLED | OUTPATIENT
Start: 2020-09-01

## 2020-09-01 RX ORDER — SODIUM CHLORIDE 0.9 % (FLUSH) 0.9 %
10 SYRINGE (ML) INJECTION PRN
Status: CANCELLED | OUTPATIENT
Start: 2020-09-01

## 2020-09-01 RX ORDER — FLUTICASONE PROPIONATE 50 MCG
2 SPRAY, SUSPENSION (ML) NASAL DAILY PRN
COMMUNITY
Start: 2020-06-05 | End: 2020-10-09 | Stop reason: SDUPTHER

## 2020-09-01 NOTE — PROGRESS NOTES
[address] [facility] Gynecology  [facility] [address]; [address]  [place] [phone] Fax        Juan Leal  1975  Primary Care Physician: ANITA Lewis CNP        HISTORY AND PHYSICAL      Hospital: [facility]      2020  MEDICAID CONSENT COMPLETED: No      HPI: Juan Leal is a 50 y.o. female   Pt with pelvic pain hx of Hysterectomy  LSO for pain. This was completed and the pain persisted. She was told she had endometriosis Stage II-III. She has a family hx of this. She denies any abnormal pap smear. She is now menopausal confirmed by lab and still has pain, non cyclical. She has seen a general surgeon and plans to have her appendix removed. I discussed laparoscopy and potential laparotomy, large skin incision  Hospitalization longer and a lengthy 6 week recovery. I answered all questions and risk and complications were reviewed. I informed the pt that she is menopausal and the endometriosis is most likely not the cause of her pain and she may awake with the same symptoms. I also reviewed that we may not be able to remove the ovary if it is severely scarred and not accessible. SHe may need a second surgery was discussed.     Relevant Past History:   Patient Active Problem List    Diagnosis Date Noted    Hx of hysterectomy with LSO (laparoscopic) 2020     Priority: High    Endometriosis of pelvic peritoneum 2020     Priority: High    Pelvic pain 2020     Priority: High    Hypotension 2018     Priority: Medium         OB History    Para Term  AB Living   2 2 2 0 0 2   SAB TAB Ectopic Molar Multiple Live Births   0 0 0 0 0 2      # Outcome Date GA Lbr Khanh/2nd Weight Sex Delivery Anes PTL Lv   2 Term 99    F Vag-Spont   HITESH   1 Term 95    F Vag-Spont   HITESH       Past Medical History:   Diagnosis Date    Endometriosis     Fibroid tumor 2017    Ovarian cyst        Past Surgical History: Procedure Laterality Date    BREAST SURGERY  2004    lumpectomy of the L     HYSTERECTOMY      cervix was taken       Social History     Socioeconomic History    Marital status: Unknown     Spouse name: Not on file    Number of children: Not on file    Years of education: Not on file    Highest education level: Not on file   Occupational History    Not on file   Social Needs    Financial resource strain: Not on file    Food insecurity     Worry: Not on file     Inability: Not on file    Transportation needs     Medical: Not on file     Non-medical: Not on file   Tobacco Use    Smoking status: Current Every Day Smoker     Packs/day: 0.50     Years: 8.00     Pack years: 4.00     Types: Cigarettes    Smokeless tobacco: Never Used   Substance and Sexual Activity    Alcohol use: No     Comment: social    Drug use: Yes     Frequency: 7.0 times per week     Types: Marijuana    Sexual activity: Not on file   Lifestyle    Physical activity     Days per week: Not on file     Minutes per session: Not on file    Stress: Not on file   Relationships    Social connections     Talks on phone: Not on file     Gets together: Not on file     Attends Uatsdin service: Not on file     Active member of club or organization: Not on file     Attends meetings of clubs or organizations: Not on file     Relationship status: Not on file    Intimate partner violence     Fear of current or ex partner: Not on file     Emotionally abused: Not on file     Physically abused: Not on file     Forced sexual activity: Not on file   Other Topics Concern    Not on file   Social History Narrative    ** Merged History Encounter **            Psychosocial History: stable    MEDICATIONS:  Current Outpatient Medications   Medication Sig Dispense Refill    fluticasone (FLONASE) 50 MCG/ACT nasal spray 2 sprays by Nasal route daily as needed      diclofenac (VOLTAREN) 50 MG EC tablet Take 50 mg by mouth nightly as needed      traMADol Pharynx without erythema, edema or exudate. Lungs:  Normal expansion. Clear to auscultation. No rales, rhonchi, or wheezing. Heart:  Heart sounds are normal.  Regular rate and rhythm without murmur, gallop or rub. Breast:  Inspection negative. No nipple discharge or bleeding. No palpable mass  Abdomen:  Soft, non-tender, normal bowel sounds. No bruits, organomegaly or masses. Extremities: Extremities warm to touch, pink, with no edema. Musculoskeletal:  negative  Peripheral Pulses:  Normal  Neurologic:  Gait normal. Reflexes normal and symmetric. Sensation grossly intact. Female Urogen:  Declined  Female Rectal: Declined    OMM Structural Component:  The patient did not complain of a Chief complaint requiring OMM. Chief Complaint:none    Structural Exam: No Interest              Diagnostics:  Us Non Ob Transvaginal     Result Date: 7/22/2020  GYNECOLOGY ULTRASOUND REPORT [facility] [address]; [address] [phone] [place]         [phone] Fax 7/23/2020 MRN: [id number] Contact Serial #: [id number] Lexi March YOB: 1975 Age: 50 y.o. The ultrasound images were reviewed. Please see the attached ultrasound report. Assessment: Lexi March is a 50 y.o. female Pelvic pain Specific Ultrasound Imaging Obtained: Transabdominal Approach: Yes Transvaginal Approach: Yes Limitations of Study Encountered: Overlying bowel & gas limiting the study: Yes Poor prep for procedure limiting study: No Elevated BMI limiting study: No Ovaries are NOT seen on Transabdominal imaging. Transvaginal imaging required: Yes Findings: 1. The Uterus is surgically absent by the patient's verbal history and is not imaged on today's study 2. The Left Ovary is surgically absent per the patient's verbal history and is not visualized on today's study 3. The Right Ovary is not seen on today's study 4.  There is not an abnormal amount of cul-de-sac fluid Electronically signed by La Kelley DO on 7/23/20 at 2:34 PM EDT      1. The Uterus is surgically absent by the patient's verbal history and is not imaged on today's study 2. The Left Ovary is surgically absent per the patient's verbal history and is not visualized on today's study 3. The Right Ovary is not seen on today's study 4.  There is not an abnormal amount of cul-de-sac fluid     Us Pelvis Complete     Result Date: 7/22/2020  See transvaginal report from same day    Lab Results:  Results for orders placed or performed in visit on 08/07/20   POCT Rapid Drug Screen   Result Value Ref Range    Amphetamine Screen, Urine neg     Barbiturate Screen, Urine neg     Benzodiazepine Screen, Urine pos     Buprenorphine Urine neg     Cocaine Metabolite Screen, Urine neg     Gabapentin Screen, Urine neg     MDMA, Urine neg     Methamphetamine, Urine neg     Methadone Screen, Urine neg     Opiate Scrn, Ur pos     Oxycodone Screen, Ur neg     PCP Screen, Urine neg     Propoxyphene Screen, Urine neg     THC Screen, Urine neg     Tricyclic Antidepressants, Urine neg      Component Date Value Ref Range Status    Amphetamine Screen, Urine 07/13/2020 NEG    Final    Barbiturate Screen, Urine 07/13/2020 NEG    Final    Benzodiazepine Screen, Urine 07/13/2020 POS    Final    Buprenorphine Urine 07/13/2020 NEG    Final    Cocaine Metabolite Screen, Urine 07/13/2020 NEG    Final    Gabapentin Screen, Urine 07/13/2020 NEG    Final    MDMA, Urine 07/13/2020 NEG    Final    Methamphetamine, Urine 07/13/2020 NEG    Final    Methadone Screen, Urine 07/13/2020 NEG    Final    Opiate Scrn, Ur 07/13/2020 NEG    Final    Oxycodone Screen, Ur 07/13/2020 NEG    Final    PCP Screen, Urine 07/13/2020 NEG    Final    Propoxyphene Screen, Urine 07/13/2020 NEG    Final    THC Screen, Urine 07/13/2020 NEG    Final    Tricyclic Antidepressants, Urine 07/13/2020 NEG    Final   Hospital Outpatient Visit on 06/23/2020   Component Date Value Ref Range Status    Estradiol 06/23/2020 <5* 27 - 314 pg/mL Final     Comment:       FEMALES:  Normally menstruating  Luteal phase                 Follicular phase             Midcycle phase               Postmenopausal (untreated)  5-50        Fulvestrant treatment will show an increased estradiol concentration with this methodology. Alternate methodologies are available upon request.             [facility] 06/23/2020 119.5* 1.7 - 21.5 U/L Final     Comment: Reference Range:  Male:                        1. 5-12.4  Ovulating Female: Follicular Phase           3.5-12.5    Ovulation Phase            4.7-21.5    Luteal Phase               1.7-7.7  Postmenopausal Female:      25.8-134.8      Hospital Outpatient Visit on 06/22/2020   Component Date Value Ref Range Status    Specimen Description 06/22/2020 . VAGINAL SPECIMEN    Final    C. trachomatis DNA 06/22/2020 NEGATIVE  NEGATIVE Final     Comment: CHLAMYDIA TRACHOMATIS DNA not detected by nucleic acid amplification. This test is intended for medical purposes only and is not valid for the evaluation of   suspected sexual abuse or for other forensic purposes. In certain contexts, culture may be required to meet applicable laws and regulations for   diagnosis of C. trachomatis and N. gonorrhoeae infections. Per 2014  CDC recommendations, this test does not include confirmation of positive results   by an alternative nucleic acid target.       N. gonorrhoeae DNA 06/22/2020 NEGATIVE  NEGATIVE Final     Comment: NEISSERIA GONORRHOEAE DNA not detected by nucleic acid amplification. This test is intended for medical purposes only and is not valid for the evaluation of   suspected sexual abuse or for other forensic purposes. In certain contexts, culture may be required to meet applicable laws and regulations for   diagnosis of C. trachomatis and N. gonorrhoeae infections.   Per 2014  CDC recommendations, this test does not include confirmation of positive results   by an alternative nucleic acid target. The patient was counseled at length about the risks of yamile Covid-19 in the khanh-operative and post-operative states including the recovery window of their procedure. The patient was made aware that yamile Covid-19 after a surgical procedure may worsen their prognosis for recovering from the virus and lend to a higher morbidity and or mortality risk. The patient was given the options of postponing their procedure. All of the risks, benefits, and alternatives were discussed. The patient  does wish to proceed with the procedure. Assessment:     Diagnosis Orders   1. Pelvic pain     2. Endometriosis of pelvic peritoneum     3. Hx of hysterectomy with LSO (laparoscopic)         Patient Active Problem List    Diagnosis Date Noted    Hx of hysterectomy with LSO (laparoscopic) 09/01/2020     Priority: High    Endometriosis of pelvic peritoneum 09/01/2020     Priority: High    Pelvic pain 09/01/2020     Priority: High    Hypotension 05/31/2018     Priority: Medium           Plan:  1. Operative Laparoscopy with Oophorectomy  2. Possible Laparotomy-Exploratory with oophorectomy  3. Co-procedure with Dr. Maycol Chamorro for Lap APPY  No orders of the defined types were placed in this encounter. Counseling: The patient was counseled on all options both medical and surgical, conservative as well as definitive. She has elected to proceed with the procedure as stated above. The patient was counseled on the procedure. Risks and complications were reviewed in detail. The patients orders, labs, consents have been completed. The history and physical as well as all supporting surgical documentation will be forwarded to the pre-operative holding area. The patient is aware that this procedure may not alleviate her symptoms.  That there may be a necessity for a second surgery and that there may be an incomplete removal of abnormal tissue. See above for additional counseling completed                  ________________________________________D. O. Date:_______________  Maxx Martinez DO        Patient was seen with total face to face time of 15 minutes. More than 50% of this visit was on counseling and education regarding her    Diagnosis Orders   1. Pelvic pain     2. Endometriosis of pelvic peritoneum     3. Hx of hysterectomy with LSO (laparoscopic)      and her options. She was also counseled on her preventative health maintenance recommendations and follow-up.

## 2020-09-03 RX ORDER — TRAZODONE HYDROCHLORIDE 100 MG/1
100 TABLET ORAL NIGHTLY
Qty: 30 TABLET | Refills: 0 | Status: SHIPPED | OUTPATIENT
Start: 2020-09-03 | End: 2020-09-29 | Stop reason: SDUPTHER

## 2020-09-09 ENCOUNTER — HOSPITAL ENCOUNTER (OUTPATIENT)
Dept: PREADMISSION TESTING | Age: 45
Discharge: HOME | End: 2020-09-13
Payer: COMMERCIAL

## 2020-09-09 ENCOUNTER — HOSPITAL ENCOUNTER (OUTPATIENT)
Dept: GENERAL RADIOLOGY | Age: 45
Discharge: HOME | End: 2020-09-11
Payer: COMMERCIAL

## 2020-09-09 VITALS
BODY MASS INDEX: 19.77 KG/M2 | RESPIRATION RATE: 16 BRPM | TEMPERATURE: 97.1 F | HEIGHT: 66 IN | DIASTOLIC BLOOD PRESSURE: 64 MMHG | WEIGHT: 123 LBS | SYSTOLIC BLOOD PRESSURE: 116 MMHG | OXYGEN SATURATION: 98 % | HEART RATE: 86 BPM

## 2020-09-09 LAB
-: NORMAL
ABO/RH: NORMAL
ABSOLUTE EOS #: 0.2 K/UL (ref 0–0.4)
ABSOLUTE IMMATURE GRANULOCYTE: ABNORMAL K/UL (ref 0–0.3)
ABSOLUTE LYMPH #: 2.6 K/UL (ref 1–4.8)
ABSOLUTE MONO #: 0.6 K/UL (ref 0.1–1.3)
AMORPHOUS: NORMAL
ANION GAP SERPL CALCULATED.3IONS-SCNC: 10 MMOL/L (ref 9–17)
ANTIBODY SCREEN: NEGATIVE
ARM BAND NUMBER: NORMAL
BACTERIA: NORMAL
BASOPHILS # BLD: 0 % (ref 0–2)
BASOPHILS ABSOLUTE: 0 K/UL (ref 0–0.2)
BILIRUBIN URINE: NEGATIVE
BLOOD BANK COMMENT: NORMAL
BUN BLDV-MCNC: 9 MG/DL (ref 6–20)
BUN/CREAT BLD: NORMAL (ref 9–20)
CALCIUM SERPL-MCNC: 9.4 MG/DL (ref 8.6–10.4)
CASTS UA: NORMAL /LPF
CHLORIDE BLD-SCNC: 100 MMOL/L (ref 98–107)
CO2: 27 MMOL/L (ref 20–31)
COLOR: YELLOW
COMMENT UA: ABNORMAL
CREAT SERPL-MCNC: 0.5 MG/DL (ref 0.5–0.9)
CRYSTALS, UA: NORMAL /HPF
DIFFERENTIAL TYPE: ABNORMAL
EOSINOPHILS RELATIVE PERCENT: 3 % (ref 0–4)
EPITHELIAL CELLS UA: NORMAL /HPF
EXPIRATION DATE: NORMAL
GFR AFRICAN AMERICAN: >60 ML/MIN
GFR NON-AFRICAN AMERICAN: >60 ML/MIN
GFR SERPL CREATININE-BSD FRML MDRD: NORMAL ML/MIN/{1.73_M2}
GFR SERPL CREATININE-BSD FRML MDRD: NORMAL ML/MIN/{1.73_M2}
GLUCOSE BLD-MCNC: 90 MG/DL (ref 70–99)
GLUCOSE URINE: NEGATIVE
HCG QUANTITATIVE: 3 IU/L
HCT VFR BLD CALC: 39.8 % (ref 36–46)
HEMOGLOBIN: 13.6 G/DL (ref 12–16)
IMMATURE GRANULOCYTES: ABNORMAL %
INR BLD: 0.9
KETONES, URINE: NEGATIVE
LEUKOCYTE ESTERASE, URINE: NEGATIVE
LYMPHOCYTES # BLD: 36 % (ref 24–44)
MCH RBC QN AUTO: 32.2 PG (ref 26–34)
MCHC RBC AUTO-ENTMCNC: 34.2 G/DL (ref 31–37)
MCV RBC AUTO: 94.2 FL (ref 80–100)
MONOCYTES # BLD: 8 % (ref 1–7)
MUCUS: NORMAL
NITRITE, URINE: NEGATIVE
NRBC AUTOMATED: ABNORMAL PER 100 WBC
OTHER OBSERVATIONS UA: NORMAL
PARTIAL THROMBOPLASTIN TIME: 28.3 SEC (ref 24–36)
PDW BLD-RTO: 13.6 % (ref 11.5–14.9)
PH UA: 6.5 (ref 5–8)
PLATELET # BLD: 333 K/UL (ref 150–450)
PLATELET ESTIMATE: ABNORMAL
PMV BLD AUTO: 7.5 FL (ref 6–12)
POTASSIUM SERPL-SCNC: 4.1 MMOL/L (ref 3.7–5.3)
PROTEIN UA: NEGATIVE
PROTHROMBIN TIME: 12.2 SEC (ref 11.8–14.6)
RBC # BLD: 4.23 M/UL (ref 4–5.2)
RBC # BLD: ABNORMAL 10*6/UL
RBC UA: NORMAL /HPF
RENAL EPITHELIAL, UA: NORMAL /HPF
SEG NEUTROPHILS: 53 % (ref 36–66)
SEGMENTED NEUTROPHILS ABSOLUTE COUNT: 3.8 K/UL (ref 1.3–9.1)
SODIUM BLD-SCNC: 137 MMOL/L (ref 135–144)
SPECIFIC GRAVITY UA: 1 (ref 1–1.03)
TRICHOMONAS: NORMAL
TURBIDITY: ABNORMAL
URINE HGB: NEGATIVE
UROBILINOGEN, URINE: NORMAL
WBC # BLD: 7.1 K/UL (ref 3.5–11)
WBC # BLD: ABNORMAL 10*3/UL
WBC UA: NORMAL /HPF
YEAST: NORMAL

## 2020-09-09 PROCEDURE — 71046 X-RAY EXAM CHEST 2 VIEWS: CPT

## 2020-09-09 PROCEDURE — 93005 ELECTROCARDIOGRAM TRACING: CPT

## 2020-09-09 PROCEDURE — 84702 CHORIONIC GONADOTROPIN TEST: CPT

## 2020-09-09 PROCEDURE — 85025 COMPLETE CBC W/AUTO DIFF WBC: CPT

## 2020-09-09 PROCEDURE — 36415 COLL VENOUS BLD VENIPUNCTURE: CPT

## 2020-09-09 PROCEDURE — 86850 RBC ANTIBODY SCREEN: CPT

## 2020-09-09 PROCEDURE — 81001 URINALYSIS AUTO W/SCOPE: CPT

## 2020-09-09 PROCEDURE — 85730 THROMBOPLASTIN TIME PARTIAL: CPT

## 2020-09-09 PROCEDURE — 86901 BLOOD TYPING SEROLOGIC RH(D): CPT

## 2020-09-09 PROCEDURE — 85610 PROTHROMBIN TIME: CPT

## 2020-09-09 PROCEDURE — 87086 URINE CULTURE/COLONY COUNT: CPT

## 2020-09-09 PROCEDURE — 80048 BASIC METABOLIC PNL TOTAL CA: CPT

## 2020-09-09 PROCEDURE — 86900 BLOOD TYPING SEROLOGIC ABO: CPT

## 2020-09-09 RX ORDER — TRAMADOL HYDROCHLORIDE 50 MG/1
50 TABLET ORAL DAILY PRN
COMMUNITY
End: 2020-10-09 | Stop reason: ALTCHOICE

## 2020-09-09 ASSESSMENT — PAIN DESCRIPTION - LOCATION: LOCATION: ABDOMEN

## 2020-09-09 ASSESSMENT — PAIN SCALES - GENERAL: PAINLEVEL_OUTOF10: 7

## 2020-09-09 ASSESSMENT — PAIN DESCRIPTION - ORIENTATION: ORIENTATION: LOWER

## 2020-09-09 ASSESSMENT — PAIN DESCRIPTION - DESCRIPTORS: DESCRIPTORS: ACHING;CRAMPING

## 2020-09-09 ASSESSMENT — PAIN DESCRIPTION - PAIN TYPE: TYPE: CHRONIC PAIN

## 2020-09-09 NOTE — PROGRESS NOTES
Dr. Lor Warner, anesthesia, was contacted and informed of the patient's planned surgery, history, and unconfirmed abnormal EKG results from EKG done today in [facility]. Medical clearance required. Surgery scheduling will notify Dr. Lencho Patel office who will be responsible for making sure the clearance is obtained and is in the chart for surgery.

## 2020-09-10 ENCOUNTER — TELEPHONE (OUTPATIENT)
Dept: PRIMARY CARE CLINIC | Age: 45
End: 2020-09-10

## 2020-09-10 ENCOUNTER — ANESTHESIA EVENT (OUTPATIENT)
Dept: OPERATING ROOM | Age: 45
End: 2020-09-10
Payer: COMMERCIAL

## 2020-09-10 LAB
CULTURE: NORMAL
EKG ATRIAL RATE: 69 BPM
EKG P AXIS: 76 DEGREES
EKG P-R INTERVAL: 150 MS
EKG Q-T INTERVAL: 410 MS
EKG QRS DURATION: 78 MS
EKG QTC CALCULATION (BAZETT): 439 MS
EKG R AXIS: 77 DEGREES
EKG T AXIS: 54 DEGREES
EKG VENTRICULAR RATE: 69 BPM
Lab: NORMAL
SPECIMEN DESCRIPTION: NORMAL

## 2020-09-10 PROCEDURE — 93010 ELECTROCARDIOGRAM REPORT: CPT | Performed by: INTERNAL MEDICINE

## 2020-09-10 RX ORDER — LIDOCAINE HYDROCHLORIDE 10 MG/ML
1 INJECTION, SOLUTION EPIDURAL; INFILTRATION; INTRACAUDAL; PERINEURAL
Status: CANCELLED | OUTPATIENT
Start: 2020-09-10 | End: 2020-09-10

## 2020-09-10 RX ORDER — SODIUM CHLORIDE 0.9 % (FLUSH) 0.9 %
10 SYRINGE (ML) INJECTION PRN
Status: CANCELLED | OUTPATIENT
Start: 2020-09-10

## 2020-09-10 RX ORDER — SODIUM CHLORIDE 0.9 % (FLUSH) 0.9 %
10 SYRINGE (ML) INJECTION EVERY 12 HOURS SCHEDULED
Status: CANCELLED | OUTPATIENT
Start: 2020-09-10

## 2020-09-10 RX ORDER — SODIUM CHLORIDE, SODIUM LACTATE, POTASSIUM CHLORIDE, CALCIUM CHLORIDE 600; 310; 30; 20 MG/100ML; MG/100ML; MG/100ML; MG/100ML
INJECTION, SOLUTION INTRAVENOUS CONTINUOUS
Status: CANCELLED | OUTPATIENT
Start: 2020-09-10

## 2020-09-10 RX ORDER — ALPRAZOLAM 1 MG/1
1 TABLET ORAL 2 TIMES DAILY PRN
Qty: 60 TABLET | Refills: 0 | Status: SHIPPED | OUTPATIENT
Start: 2020-09-10 | End: 2020-10-09 | Stop reason: SDUPTHER

## 2020-09-10 NOTE — TELEPHONE ENCOUNTER
Teresa Dickey office calling and states that the pt has a surgery scheduled on 09/24/20 and pre-op came back and needs to have medical clearance for the surgery. She will be faxing over the information as well.

## 2020-09-18 ENCOUNTER — OFFICE VISIT (OUTPATIENT)
Dept: PRIMARY CARE CLINIC | Age: 45
End: 2020-09-18
Payer: COMMERCIAL

## 2020-09-18 VITALS
WEIGHT: 127.8 LBS | HEART RATE: 86 BPM | TEMPERATURE: 96.6 F | SYSTOLIC BLOOD PRESSURE: 142 MMHG | OXYGEN SATURATION: 98 % | DIASTOLIC BLOOD PRESSURE: 86 MMHG | BODY MASS INDEX: 20.63 KG/M2

## 2020-09-18 PROCEDURE — 4004F PT TOBACCO SCREEN RCVD TLK: CPT | Performed by: NURSE PRACTITIONER

## 2020-09-18 PROCEDURE — 99213 OFFICE O/P EST LOW 20 MIN: CPT | Performed by: NURSE PRACTITIONER

## 2020-09-18 PROCEDURE — G8420 CALC BMI NORM PARAMETERS: HCPCS | Performed by: NURSE PRACTITIONER

## 2020-09-18 PROCEDURE — G8427 DOCREV CUR MEDS BY ELIG CLIN: HCPCS | Performed by: NURSE PRACTITIONER

## 2020-09-18 ASSESSMENT — ENCOUNTER SYMPTOMS
COUGH: 0
VOMITING: 0
RHINORRHEA: 0
EYE DISCHARGE: 0
EYE REDNESS: 0
SHORTNESS OF BREATH: 0
SORE THROAT: 0
WHEEZING: 0
ABDOMINAL PAIN: 1
NAUSEA: 0
DIARRHEA: 0

## 2020-09-18 NOTE — PROGRESS NOTES
[address] [facility] PRIMARY CARE  [address]  [facility] [address]  Dept: Joss Cardenas is a 50 y.o. female who presents today for her medical conditions/complaintsas noted below. Chief Complaint   Patient presents with    Other     Pt needs clearance for surgery. HPI:     HPI  Planned surgery 9/24 Laparotomy Exploratory - remove right ovary and appendix per  Dr. Aubrey Villegas. Abdominal pain is getting worse. Otherwise feels good  She hs not been going out of house do to Covid concerns. She has not been back in school yet. Trazodone is helping with sleep - she is waking up at night but able to go back to sleep  Estroven helping with hot flashes  No results found for: LDLCHOLESTEROL, LDLCALC    (goal LDL is <100)   AST (U/L)   Date Value   05/30/2018 16     ALT (U/L)   Date Value   05/30/2018 14     BUN (mg/dL)   Date Value   09/09/2020 9     BP Readings from Last 3 Encounters:   09/18/20 (!) 142/86   09/09/20 116/64   09/01/20 112/70          (goal 120/80)    Past Medical History:   Diagnosis Date    Endometriosis     Fibroid tumor 2017    Ovarian cyst     Wears dentures     uppers      Past Surgical History:   Procedure Laterality Date    BREAST SURGERY  2004    lumpectomy of the L     HYSTERECTOMY      cervix was taken    WISDOM TOOTH EXTRACTION         Family History   Problem Relation Age of Onset    Colon Cancer Paternal Grandfather        Social History     Tobacco Use    Smoking status: Current Every Day Smoker     Packs/day: 0.25     Years: 8.00     Pack years: 2.00     Types: Cigarettes    Smokeless tobacco: Never Used   Substance Use Topics    Alcohol use: No     Comment: social      Current Outpatient Medications   Medication Sig Dispense Refill    ALPRAZolam (XANAX) 1 MG tablet Take 1 tablet by mouth 2 times daily as needed for Sleep or Anxiety for up to 30 days.  60 tablet 0    traMADol (ULTRAM) 50 MG tablet Take 50 mg by mouth daily as needed for Pain.  traZODone (DESYREL) 100 MG tablet Take 1 tablet by mouth nightly 30 tablet 0    fluticasone (FLONASE) 50 MCG/ACT nasal spray 2 sprays by Nasal route daily as needed      diclofenac (VOLTAREN) 50 MG EC tablet Take 50 mg by mouth nightly as needed      Nutritional Supplements (ESTROVEN PO) Take by mouth      megestrol (MEGACE) 40 MG/ML suspension Take 10 mLs by mouth daily      triamcinolone (KENALOG) 0.1 % ointment Apply topically 2 times daily 1 Tube 3    Calcium Polycarbophil (FIBER-CAPS PO) Take 3 capsules by mouth daily      POTASSIUM PO Take by mouth Unsure of dose      B Complex-C (SUPER B COMPLEX PO) Take by mouth       No current facility-administered medications for this visit. Allergies   Allergen Reactions    Diphenhydramine Itching    Sleep Aid [Diphenhydramine Hcl (Sleep)]        Health Maintenance   Topic Date Due    HIV screen  03/27/1988    DTaP/Tdap/Td vaccine (1 - Tdap) 03/27/1992    Lipid screen  03/27/2013    Flu vaccine (1) 09/01/2020    Pneumococcal 0-64 years Vaccine  Completed    Hepatitis A vaccine  Aged Out    Hepatitis B vaccine  Aged Out    Hib vaccine  Aged Out    Meningococcal (ACWY) vaccine  Aged Out       Subjective:      Review of Systems   Constitutional: Negative for chills, fatigue and fever. HENT: Negative for congestion, ear pain, postnasal drip, rhinorrhea and sore throat. Eyes: Negative for discharge and redness. Respiratory: Negative for cough, shortness of breath and wheezing. Cardiovascular: Negative for chest pain and palpitations. Gastrointestinal: Positive for abdominal pain. Negative for diarrhea, nausea and vomiting. Genitourinary: Negative for difficulty urinating, dysuria and frequency. Musculoskeletal: Negative for arthralgias and myalgias. Skin: Negative for rash and wound. Neurological: Negative for dizziness, weakness, light-headedness and headaches.    Psychiatric/Behavioral: Thought Content: Thought content normal.         Cognition and Memory: Cognition and memory normal.         Assessment:       Diagnosis Orders   1. Pre-operative clearance     2. Anxiety     3. Medication management          Plan:    Cleared for surgery with low risk    Continue current medications until after surgery and may discuss adjustment to trazodone next appt   Patient wishes to have vaccinations after surgery. Return in about 3 weeks (around 10/9/2020) for immunizations and med check. No orders of the defined types were placed in this encounter. No orders of the defined types were placed in this encounter. Patient given educationalmaterials - see patient instructions. Discussed use, benefit, and side effectsof prescribed medications. All patient questions answered. Pt voiced understanding. Reviewed health maintenance. Instructed to continue current medications, diet andexercise. Patient agreed with treatment plan. Follow up as directed.      Electronicallysigned by ANITA Barnes CNP on 9/18/2020 at 9:30 AM

## 2020-09-18 NOTE — PATIENT INSTRUCTIONS
Cleared for surgery with low risk    Continue current medications until after surgery and may discuss adjustment to trazodone next appt   Patient wishes to have vaccinations after surgery.

## 2020-09-20 ENCOUNTER — HOSPITAL ENCOUNTER (OUTPATIENT)
Dept: PREADMISSION TESTING | Age: 45
Setting detail: SPECIMEN
Discharge: HOME | End: 2020-09-24
Payer: COMMERCIAL

## 2020-09-20 PROCEDURE — U0003 INFECTIOUS AGENT DETECTION BY NUCLEIC ACID (DNA OR RNA); SEVERE ACUTE RESPIRATORY SYNDROME CORONAVIRUS 2 (SARS-COV-2) (CORONAVIRUS DISEASE [COVID-19]), AMPLIFIED PROBE TECHNIQUE, MAKING USE OF HIGH THROUGHPUT TECHNOLOGIES AS DESCRIBED BY CMS-2020-01-R: HCPCS

## 2020-09-22 LAB — SARS-COV-2, NAA: NOT DETECTED

## 2020-09-23 NOTE — H&P
Update History & Physical- (Gynecology)    [facility]  Gynecologic Surgery  PHYSICIAN PRE-OPERATIVE NOTE:      Patient Name: Kathrine Kehr  Patient : 1975  Room/Bed: [facility] OR Pool/NONE  Admission Date/Time: 2020  5:14 AM  Primary Care Physician: ANITA Newsome CNP  MRN #: [id number]  [facility] #: [id number]        Date: 2020  Time: 7:34 AM      The patient's History and Physical was reviewed with the patient and there were no significant changes. I examined the patient and there were no significant changes from the previous History and Physical.    Kathrine Kehr is a 50 y.o. female [id number]  patient with pelvic pain and hx of Hysterectomy LSO for pain. This was completed and the pain persisted. Patient reports she had endometriosis Stage II-III, and has a family history of endometriosis. She denies any abnormal pap smear. She is now menopausal confirmed by lab and still has pain, non cyclical. Patient was previously informed that she is menopausal and the endometriosis is most likely not the cause of her pain and she may awake with the same symptoms. Also previously reviewed that we may not be able to remove the ovary if it is severely scarred and not accessible. She may need a second surgery was discussed. She has seen a general surgeon and plans to have her appendix removed. Previously discussed laparoscopy and potential laparotomy, large skin incision, hospitalization longer and a lengthy 6 week recovery. All questions were answered and risk and complications were reviewed. Plan: The risk, benefits, expected outcome, and alternative to the recommended procedure have been discussed with the patient. Patient understands and wants to proceed with the procedure. She is aware that there may be a need for a second procedure and there may be incomplete removal of any abnormal tissue.  She was also counseled on the possibility of Bleeding, Infection, possible damage to bowel, bladder,  ureters, vasculature and surrounding organs-(described in layman's terminology to the patient). The labs and consent were reviewed prior to the patient going to the Operating Room. Vitals:   Vitals:    09/24/20 0617 09/24/20 0630   BP:  109/69   Pulse:  71   Resp:  16   Temp:  97.5 °F (36.4 °C)   TempSrc:  Infrared   SpO2:  100%   Weight: 123 lb (55.8 kg)    Height: 5' 6\" (1.676 m)        General appearance:  no apparent distress, alert and cooperative  Neurologic:  alert, oriented, normal speech, no focal findings or movement disorder noted  Lungs:  No increased work of breathing, good air exchange, clear to auscultation bilaterally, no crackles or wheezing  Heart:  regular rate and rhythm and no murmur    Abdomen:  soft, non-tender, non-distended no right upper quadrant tenderness, no CVA tenderness  Extremities:  no calf tenderness, non edematous, DTR's: normal    Pelvic Exam: deferred to OR    Patient Active Problem List    Diagnosis Date Noted    Hx of hysterectomy with LSO (laparoscopic) 09/01/2020    Endometriosis of pelvic peritoneum 09/01/2020    Pelvic pain 09/01/2020    Hypotension 05/31/2018         Lab Results:  Hospital Outpatient Visit on 09/20/2020   Component Date Value Ref Range Status    SARS-CoV-2, ARMANDO 09/20/2020 Not Detected  Not Detected Final    Comment: (NOTE)  This nucleic acid amplification test was developed and its  performance characteristics determined by [employer]. Nucleic acid amplification tests include PCR and TMA. This test has  not been FDA cleared or approved. This test has been authorized by  FDA under an Emergency Use Authorization (EUA). This test is only  authorized for the duration of time the declaration that  circumstances exist justifying the authorization of the emergency use  of in vitro diagnostic tests for detection of SARS-CoV-2 virus and/or  diagnosis of COVID-19 infection under section 564(b)(1) of the Act,  21 U. S.C. 622KZW-0(A) (1), unless the authorization is terminated or  revoked sooner. When diagnostic testing is negative, the possibility of a false  negative result should be considered in the context of a patient's  recent exposures and the presence of clinical signs and symptoms  consistent with COVID-19. An individual without symptoms of COVID-  19 and who is not shedding SARS-CoV-2 vi                           magdiel would expect to have a  negative (not detected) result in this assay. Performed At: [address], [address] [facility] Outpatient Visit on 09/09/2020   Component Date Value Ref Range Status    Ventricular Rate 09/09/2020 69  BPM Final    Atrial Rate 09/09/2020 69  BPM Final    P-R Interval 09/09/2020 150  ms Final    QRS Duration 09/09/2020 78  ms Final    Q-T Interval 09/09/2020 410  ms Final    QTc Calculation (Bazett) 09/09/2020 439  ms Final    P Axis 09/09/2020 76  degrees Final    R Axis 09/09/2020 77  degrees Final    T Axis 09/09/2020 54  degrees Final    Specimen Description 09/09/2020 . CLEAN CATCH URINE   Final    Special Requests 09/09/2020 NOT REPORTED   Final    Culture 09/09/2020 NO SIGNIFICANT GROWTH   Final    Color, UA 09/09/2020 YELLOW  YELLOW Final    Turbidity UA 09/09/2020 CLOUDY* CLEAR Final    Glucose, Ur 09/09/2020 NEGATIVE  NEGATIVE Final    Bilirubin Urine 09/09/2020 NEGATIVE  NEGATIVE Final    Ketones, Urine 09/09/2020 NEGATIVE  NEGATIVE Final    Specific [place], UA 09/09/2020 1.005  1.000 - 1.030 Final    Urine Hgb 09/09/2020 NEGATIVE  NEGATIVE Final    pH, UA 09/09/2020 6.5  5.0 - 8.0 Final    Protein, UA 09/09/2020 NEGATIVE  NEGATIVE Final    Urobilinogen, Urine 09/09/2020 Normal  Normal Final    Nitrite, Urine 09/09/2020 NEGATIVE  NEGATIVE Final    Leukocyte Esterase, Urine 09/09/2020 NEGATIVE  NEGATIVE Final    Urinalysis Comments 09/09/2020 NOT REPORTED   Final    Expiration Date 09/09/2020 09/27/2020,2359   Final    Arm Band Number 09/09/2020 [id number]   Final    ABO/Rh 09/09/2020 A POSITIVE   Final    Antibody Screen 09/09/2020 NEGATIVE   Final    Blood Bank Comment 09/09/2020    Final                    Value:ABORH CONFIRMED AS A POS: 403  Results Verified      hCG Quant 09/09/2020 3  <5 IU/L Final    Comment:    Non-preg premeno   <=5  Postmeno           <=8  Male               <=3  If HCG results do not concur with clinical observations, additional testing to confirm   results is recommended. Elevated results not associated with pregnancy may be found in patients with other diseases   such as tumors of the germ cells (testis, ovaries, etc.), bladder, pancreas, stomach, lungs,   and liver.  Protime 09/09/2020 12.2  11.8 - 14.6 sec Final    INR 09/09/2020 0.9   Final    Comment:       Non-therapeutic Range:     INR = 0.9-1.2  Therapeutic Range:    Moderate Anticoagulant Intensity:     INR = 2.0-3.0   High Anticoagulant Intensity:     INR = 2.5-3.5            WBC 09/09/2020 7.1  3.5 - 11.0 k/uL Final    RBC 09/09/2020 4.23  4.0 - 5.2 m/uL Final    Hemoglobin 09/09/2020 13.6  12.0 - 16.0 g/dL Final    Hematocrit 09/09/2020 39.8  36 - 46 % Final    MCV 09/09/2020 94.2  80 - 100 fL Final    MCH 09/09/2020 32.2  26 - 34 pg Final    MCHC 09/09/2020 34.2  31 - 37 g/dL Final    RDW 09/09/2020 13.6  11.5 - 14.9 % Final    Platelets 69/51/9865 333  150 - 450 k/uL Final    MPV 09/09/2020 7.5  6.0 - 12.0 fL Final    NRBC Automated 09/09/2020 NOT REPORTED  per 100 WBC Final    Differential Type 09/09/2020 NOT REPORTED   Final    Seg Neutrophils 09/09/2020 53  36 - 66 % Final    Lymphocytes 09/09/2020 36  24 - 44 % Final    Monocytes 09/09/2020 8* 1 - 7 % Final    Eosinophils % 09/09/2020 3  0 - 4 % Final    Basophils 09/09/2020 0  0 - 2 % Final    Immature Granulocytes 09/09/2020 NOT REPORTED  0 % Final    Segs Absolute 09/09/2020 3.80  1.3 - 9.1 k/uL Final    Absolute Lymph # 09/09/2020 2.60  1.0 - 4.8 k/uL Final    Absolute Mono # 09/09/2020 0.60  0.1 - 1.3 k/uL Final    Absolute Eos # 09/09/2020 0.20  0.0 - 0.4 k/uL Final    Basophils Absolute 09/09/2020 0.00  0.0 - 0.2 k/uL Final    Absolute Immature Granulocyte 09/09/2020 NOT REPORTED  0.00 - 0.30 k/uL Final    WBC Morphology 09/09/2020 NOT REPORTED   Final    RBC Morphology 09/09/2020 NOT REPORTED   Final    Platelet Estimate 58/45/0622 NOT REPORTED   Final    Glucose 09/09/2020 90  70 - 99 mg/dL Final    BUN 09/09/2020 9  6 - 20 mg/dL Final    CREATININE 09/09/2020 0.50  0.50 - 0.90 mg/dL Final    Bun/Cre Ratio 09/09/2020 NOT REPORTED  9 - 20 Final    Calcium 09/09/2020 9.4  8.6 - 10.4 mg/dL Final    Sodium 09/09/2020 137  135 - 144 mmol/L Final    Potassium 09/09/2020 4.1  3.7 - 5.3 mmol/L Final    Chloride 09/09/2020 100  98 - 107 mmol/L Final    CO2 09/09/2020 27  20 - 31 mmol/L Final    Anion Gap 09/09/2020 10  9 - 17 mmol/L Final    GFR Non- 09/09/2020 >60  >60 mL/min Final    GFR  09/09/2020 >60  >60 mL/min Final    GFR Comment 09/09/2020        Final    Comment: Average GFR for 38-51 years old:   80 mL/min/1.73sq m  Chronic Kidney Disease:   <60 mL/min/1.73sq m  Kidney failure:   <15 mL/min/1.73sq m              eGFR calculated using average adult body mass.  Additional eGFR calculator available at:        [email or web address]            GFR Staging 09/09/2020 NOT REPORTED   Final    PTT 09/09/2020 28.3  24.0 - 36.0 sec Final    Comment:       IV Heparin Therapy Range:      62.0-94.0            - 09/09/2020        Final    WBC, UA 09/09/2020 0 TO 2  /HPF Final    RBC, UA 09/09/2020 None  /HPF Final    Casts UA 09/09/2020 NOT REPORTED  /LPF Final    Crystals, UA 09/09/2020 NOT REPORTED  None /HPF Final    Epithelial Cells UA 09/09/2020 NOT REPORTED  /HPF Final    Renal Epithelial, UA 09/09/2020 NOT REPORTED  0 /HPF Final  Bacteria, UA 09/09/2020 NOT REPORTED  None Final    Mucus, UA 09/09/2020 NOT REPORTED  None Final    Trichomonas, UA 09/09/2020 NOT REPORTED  None Final    Amorphous, UA 09/09/2020 NOT REPORTED  None Final    Other Observations UA 09/09/2020 NOT REPORTED  NOT REQ. Final    Yeast, UA 09/09/2020 NOT REPORTED  None Final     Narrative    GYNECOLOGY ULTRASOUND REPORT                        [facility] [facility]    [address]; [address]    [address]    [phone] mn [phone] Fax              7/23/2020         MRN: [id number]    Contact Serial #: [id number]         Patel Damon    YOB: 1975    Age: 50 y.o.              The ultrasound images were reviewed. Please see the attached ultrasound report.         Assessment:    Patel Damon is a 50 y.o. female    Pelvic pain         Specific Ultrasound Imaging Obtained:    Transabdominal Approach: Yes    Transvaginal Approach: Yes         Limitations of Study Encountered:    Overlying bowel & gas limiting the study: Yes    Poor prep for procedure limiting study: No    Elevated BMI limiting study: No    Ovaries are NOT seen on Transabdominal imaging. Transvaginal imaging required: Yes         Findings:    1. The Uterus is surgically absent by the patient's verbal history and is not imaged on today's study    2. The Left Ovary is surgically absent per the patient's verbal history and is not visualized on today's study    3. The Right Ovary is not seen on today's study    4. There is not an abnormal amount of cul-de-sac fluid          Assessment:  1. Pelvic pain      2. Endometriosis of pelvic peritoneum      3. Hx of hysterectomy with LSO (laparoscopic)        Planned Procedure:  1. Operative Laparoscopy with Oophorectomy  2. Possible Laparotomy-Exploratory with oophorectomy  3.  Co-procedure with General Surgery (Dr. Rajni Rubi) for Lap APPY  4. Bilateral Ureteral catheter placement by Urology ( Ori    Counseling: The patient was counseled on all options both medical and surgical, conservative as well as definitive. She has elected to proceed with the procedure as stated above.     The patient was counseled on the procedure. Risks and complications were reviewed in detail. The patients orders, labs, consents have been completed. The history and physical as well as all supporting surgical documentation will be forwarded to the pre-operative holding area.     The patient is aware that this procedure may not alleviate her symptoms. That there may be a necessity for a second surgery and that there may be an incomplete removal of abnormal tissue. The patient was counseled at length about the risks of yamile Covid-19 during their perioperative period and any recovery window from their procedure. The patient was made aware that yamile Covid-19  may worsen their prognosis for recovering from their procedure  and lend to a higher morbidity and/or mortality risk. All material risks, benefits, and reasonable alternatives including postponing the procedure were discussed. The patient does wish to proceed with the procedure at this time.       Electronically signed by Curlene Nageotte, DO  on 9/24/2020 at 7:34 AM

## 2020-09-24 ENCOUNTER — ANESTHESIA (OUTPATIENT)
Dept: OPERATING ROOM | Age: 45
End: 2020-09-24
Payer: COMMERCIAL

## 2020-09-24 ENCOUNTER — HOSPITAL ENCOUNTER (OUTPATIENT)
Age: 45
Setting detail: OUTPATIENT SURGERY
Discharge: HOME | End: 2020-09-24
Attending: OBSTETRICS & GYNECOLOGY | Admitting: OBSTETRICS & GYNECOLOGY
Payer: COMMERCIAL

## 2020-09-24 VITALS
BODY MASS INDEX: 19.77 KG/M2 | DIASTOLIC BLOOD PRESSURE: 70 MMHG | WEIGHT: 123 LBS | RESPIRATION RATE: 16 BRPM | TEMPERATURE: 97.3 F | SYSTOLIC BLOOD PRESSURE: 125 MMHG | HEIGHT: 66 IN | OXYGEN SATURATION: 98 % | HEART RATE: 68 BPM

## 2020-09-24 VITALS — SYSTOLIC BLOOD PRESSURE: 154 MMHG | OXYGEN SATURATION: 98 % | TEMPERATURE: 83.8 F | DIASTOLIC BLOOD PRESSURE: 85 MMHG

## 2020-09-24 PROBLEM — Z98.890 POSTOPERATIVE STATE: Status: ACTIVE | Noted: 2020-09-24

## 2020-09-24 PROBLEM — Z90.721 HISTORY OF RIGHT OOPHORECTOMY: Status: ACTIVE | Noted: 2020-09-24

## 2020-09-24 PROCEDURE — 2500000003 HC RX 250 WO HCPCS: Performed by: OBSTETRICS & GYNECOLOGY

## 2020-09-24 PROCEDURE — 2709999900 HC NON-CHARGEABLE SUPPLY: Performed by: OBSTETRICS & GYNECOLOGY

## 2020-09-24 PROCEDURE — 7100000000 HC PACU RECOVERY - FIRST 15 MIN: Performed by: OBSTETRICS & GYNECOLOGY

## 2020-09-24 PROCEDURE — 6360000002 HC RX W HCPCS: Performed by: OBSTETRICS & GYNECOLOGY

## 2020-09-24 PROCEDURE — 3600000004 HC SURGERY LEVEL 4 BASE: Performed by: OBSTETRICS & GYNECOLOGY

## 2020-09-24 PROCEDURE — 2720000010 HC SURG SUPPLY STERILE: Performed by: OBSTETRICS & GYNECOLOGY

## 2020-09-24 PROCEDURE — C1758 CATHETER, URETERAL: HCPCS | Performed by: OBSTETRICS & GYNECOLOGY

## 2020-09-24 PROCEDURE — 6360000002 HC RX W HCPCS: Performed by: NURSE ANESTHETIST, CERTIFIED REGISTERED

## 2020-09-24 PROCEDURE — 3700000000 HC ANESTHESIA ATTENDED CARE: Performed by: OBSTETRICS & GYNECOLOGY

## 2020-09-24 PROCEDURE — 7100000010 HC PHASE II RECOVERY - FIRST 15 MIN: Performed by: OBSTETRICS & GYNECOLOGY

## 2020-09-24 PROCEDURE — C1769 GUIDE WIRE: HCPCS | Performed by: OBSTETRICS & GYNECOLOGY

## 2020-09-24 PROCEDURE — 3700000001 HC ADD 15 MINUTES (ANESTHESIA): Performed by: OBSTETRICS & GYNECOLOGY

## 2020-09-24 PROCEDURE — 2500000003 HC RX 250 WO HCPCS: Performed by: NURSE ANESTHETIST, CERTIFIED REGISTERED

## 2020-09-24 PROCEDURE — 7100000031 HC ASPR PHASE II RECOVERY - ADDTL 15 MIN: Performed by: OBSTETRICS & GYNECOLOGY

## 2020-09-24 PROCEDURE — 7100000011 HC PHASE II RECOVERY - ADDTL 15 MIN: Performed by: OBSTETRICS & GYNECOLOGY

## 2020-09-24 PROCEDURE — 6370000000 HC RX 637 (ALT 250 FOR IP): Performed by: ANESTHESIOLOGY

## 2020-09-24 PROCEDURE — 58661 LAPAROSCOPY REMOVE ADNEXA: CPT | Performed by: OBSTETRICS & GYNECOLOGY

## 2020-09-24 PROCEDURE — 2580000003 HC RX 258: Performed by: NURSE ANESTHETIST, CERTIFIED REGISTERED

## 2020-09-24 PROCEDURE — 7100000001 HC PACU RECOVERY - ADDTL 15 MIN: Performed by: OBSTETRICS & GYNECOLOGY

## 2020-09-24 PROCEDURE — 2580000003 HC RX 258: Performed by: OBSTETRICS & GYNECOLOGY

## 2020-09-24 PROCEDURE — 88304 TISSUE EXAM BY PATHOLOGIST: CPT

## 2020-09-24 PROCEDURE — 7100000030 HC ASPR PHASE II RECOVERY - FIRST 15 MIN: Performed by: OBSTETRICS & GYNECOLOGY

## 2020-09-24 PROCEDURE — 6360000002 HC RX W HCPCS: Performed by: ANESTHESIOLOGY

## 2020-09-24 PROCEDURE — 3600000014 HC SURGERY LEVEL 4 ADDTL 15MIN: Performed by: OBSTETRICS & GYNECOLOGY

## 2020-09-24 PROCEDURE — 88305 TISSUE EXAM BY PATHOLOGIST: CPT

## 2020-09-24 RX ORDER — KETOROLAC TROMETHAMINE 30 MG/ML
INJECTION, SOLUTION INTRAMUSCULAR; INTRAVENOUS PRN
Status: DISCONTINUED | OUTPATIENT
Start: 2020-09-24 | End: 2020-09-24 | Stop reason: SDUPTHER

## 2020-09-24 RX ORDER — IBUPROFEN 600 MG/1
600 TABLET ORAL EVERY 6 HOURS PRN
Qty: 30 TABLET | Refills: 0 | Status: SHIPPED | OUTPATIENT
Start: 2020-09-24 | End: 2020-11-10

## 2020-09-24 RX ORDER — OXYCODONE HYDROCHLORIDE AND ACETAMINOPHEN 5; 325 MG/1; MG/1
1 TABLET ORAL
Status: COMPLETED | OUTPATIENT
Start: 2020-09-24 | End: 2020-09-24

## 2020-09-24 RX ORDER — DOCUSATE SODIUM 100 MG/1
100 CAPSULE, LIQUID FILLED ORAL 2 TIMES DAILY
Qty: 60 CAPSULE | Refills: 0 | Status: SHIPPED | OUTPATIENT
Start: 2020-09-24 | End: 2020-11-10

## 2020-09-24 RX ORDER — 0.9 % SODIUM CHLORIDE 0.9 %
500 INTRAVENOUS SOLUTION INTRAVENOUS
Status: DISCONTINUED | OUTPATIENT
Start: 2020-09-24 | End: 2020-09-24 | Stop reason: HOSPADM

## 2020-09-24 RX ORDER — LABETALOL HYDROCHLORIDE 5 MG/ML
5 INJECTION, SOLUTION INTRAVENOUS EVERY 10 MIN PRN
Status: DISCONTINUED | OUTPATIENT
Start: 2020-09-24 | End: 2020-09-24 | Stop reason: HOSPADM

## 2020-09-24 RX ORDER — HYDRALAZINE HYDROCHLORIDE 20 MG/ML
5 INJECTION INTRAMUSCULAR; INTRAVENOUS EVERY 10 MIN PRN
Status: DISCONTINUED | OUTPATIENT
Start: 2020-09-24 | End: 2020-09-24 | Stop reason: HOSPADM

## 2020-09-24 RX ORDER — FENTANYL CITRATE 50 UG/ML
INJECTION, SOLUTION INTRAMUSCULAR; INTRAVENOUS PRN
Status: DISCONTINUED | OUTPATIENT
Start: 2020-09-24 | End: 2020-09-24 | Stop reason: SDUPTHER

## 2020-09-24 RX ORDER — SODIUM CHLORIDE 0.9 % (FLUSH) 0.9 %
10 SYRINGE (ML) INJECTION PRN
Status: DISCONTINUED | OUTPATIENT
Start: 2020-09-24 | End: 2020-09-24 | Stop reason: HOSPADM

## 2020-09-24 RX ORDER — METOCLOPRAMIDE HYDROCHLORIDE 5 MG/ML
10 INJECTION INTRAMUSCULAR; INTRAVENOUS
Status: DISCONTINUED | OUTPATIENT
Start: 2020-09-24 | End: 2020-09-24 | Stop reason: HOSPADM

## 2020-09-24 RX ORDER — SODIUM CHLORIDE, SODIUM LACTATE, POTASSIUM CHLORIDE, CALCIUM CHLORIDE 600; 310; 30; 20 MG/100ML; MG/100ML; MG/100ML; MG/100ML
INJECTION, SOLUTION INTRAVENOUS CONTINUOUS PRN
Status: DISCONTINUED | OUTPATIENT
Start: 2020-09-24 | End: 2020-09-24 | Stop reason: SDUPTHER

## 2020-09-24 RX ORDER — LIDOCAINE HYDROCHLORIDE 10 MG/ML
INJECTION, SOLUTION EPIDURAL; INFILTRATION; INTRACAUDAL; PERINEURAL PRN
Status: DISCONTINUED | OUTPATIENT
Start: 2020-09-24 | End: 2020-09-24 | Stop reason: SDUPTHER

## 2020-09-24 RX ORDER — PROMETHAZINE HYDROCHLORIDE 25 MG/ML
6.25 INJECTION, SOLUTION INTRAMUSCULAR; INTRAVENOUS
Status: DISCONTINUED | OUTPATIENT
Start: 2020-09-24 | End: 2020-09-24 | Stop reason: CLARIF

## 2020-09-24 RX ORDER — FENTANYL CITRATE 50 UG/ML
25 INJECTION, SOLUTION INTRAMUSCULAR; INTRAVENOUS EVERY 5 MIN PRN
Status: DISCONTINUED | OUTPATIENT
Start: 2020-09-24 | End: 2020-09-24 | Stop reason: HOSPADM

## 2020-09-24 RX ORDER — OXYCODONE HYDROCHLORIDE AND ACETAMINOPHEN 5; 325 MG/1; MG/1
1 TABLET ORAL EVERY 6 HOURS PRN
Qty: 20 TABLET | Refills: 0 | Status: SHIPPED | OUTPATIENT
Start: 2020-09-24 | End: 2020-09-29

## 2020-09-24 RX ORDER — DEXAMETHASONE SODIUM PHOSPHATE 4 MG/ML
INJECTION, SOLUTION INTRA-ARTICULAR; INTRALESIONAL; INTRAMUSCULAR; INTRAVENOUS; SOFT TISSUE PRN
Status: DISCONTINUED | OUTPATIENT
Start: 2020-09-24 | End: 2020-09-24 | Stop reason: SDUPTHER

## 2020-09-24 RX ORDER — MIDAZOLAM HYDROCHLORIDE 1 MG/ML
INJECTION INTRAMUSCULAR; INTRAVENOUS PRN
Status: DISCONTINUED | OUTPATIENT
Start: 2020-09-24 | End: 2020-09-24 | Stop reason: SDUPTHER

## 2020-09-24 RX ORDER — ONDANSETRON 2 MG/ML
INJECTION INTRAMUSCULAR; INTRAVENOUS PRN
Status: DISCONTINUED | OUTPATIENT
Start: 2020-09-24 | End: 2020-09-24 | Stop reason: SDUPTHER

## 2020-09-24 RX ORDER — SODIUM CHLORIDE, SODIUM LACTATE, POTASSIUM CHLORIDE, CALCIUM CHLORIDE 600; 310; 30; 20 MG/100ML; MG/100ML; MG/100ML; MG/100ML
INJECTION, SOLUTION INTRAVENOUS CONTINUOUS
Status: DISCONTINUED | OUTPATIENT
Start: 2020-09-24 | End: 2020-09-24 | Stop reason: HOSPADM

## 2020-09-24 RX ORDER — LIDOCAINE HYDROCHLORIDE 10 MG/ML
1 INJECTION, SOLUTION EPIDURAL; INFILTRATION; INTRACAUDAL; PERINEURAL
Status: DISCONTINUED | OUTPATIENT
Start: 2020-09-24 | End: 2020-09-24 | Stop reason: HOSPADM

## 2020-09-24 RX ORDER — PROPOFOL 10 MG/ML
INJECTION, EMULSION INTRAVENOUS PRN
Status: DISCONTINUED | OUTPATIENT
Start: 2020-09-24 | End: 2020-09-24 | Stop reason: SDUPTHER

## 2020-09-24 RX ORDER — ROCURONIUM BROMIDE 10 MG/ML
INJECTION, SOLUTION INTRAVENOUS PRN
Status: DISCONTINUED | OUTPATIENT
Start: 2020-09-24 | End: 2020-09-24 | Stop reason: SDUPTHER

## 2020-09-24 RX ORDER — BUPIVACAINE HYDROCHLORIDE 5 MG/ML
INJECTION, SOLUTION EPIDURAL; INTRACAUDAL PRN
Status: DISCONTINUED | OUTPATIENT
Start: 2020-09-24 | End: 2020-09-24 | Stop reason: ALTCHOICE

## 2020-09-24 RX ORDER — SODIUM CHLORIDE 0.9 % (FLUSH) 0.9 %
10 SYRINGE (ML) INJECTION EVERY 12 HOURS SCHEDULED
Status: DISCONTINUED | OUTPATIENT
Start: 2020-09-24 | End: 2020-09-24 | Stop reason: HOSPADM

## 2020-09-24 RX ADMIN — DEXAMETHASONE SODIUM PHOSPHATE 8 MG: 4 INJECTION, SOLUTION INTRA-ARTICULAR; INTRALESIONAL; INTRAMUSCULAR; INTRAVENOUS; SOFT TISSUE at 08:15

## 2020-09-24 RX ADMIN — SUGAMMADEX 200 MG: 100 INJECTION, SOLUTION INTRAVENOUS at 09:52

## 2020-09-24 RX ADMIN — FENTANYL CITRATE 100 MCG: 50 INJECTION, SOLUTION INTRAMUSCULAR; INTRAVENOUS at 08:05

## 2020-09-24 RX ADMIN — KETOROLAC TROMETHAMINE 30 MG: 30 INJECTION, SOLUTION INTRAMUSCULAR; INTRAVENOUS at 09:27

## 2020-09-24 RX ADMIN — ROCURONIUM BROMIDE 20 MG: 10 INJECTION INTRAVENOUS at 08:38

## 2020-09-24 RX ADMIN — OXYCODONE HYDROCHLORIDE AND ACETAMINOPHEN 1 TABLET: 5; 325 TABLET ORAL at 11:12

## 2020-09-24 RX ADMIN — SODIUM CHLORIDE, POTASSIUM CHLORIDE, SODIUM LACTATE AND CALCIUM CHLORIDE: 600; 310; 30; 20 INJECTION, SOLUTION INTRAVENOUS at 06:52

## 2020-09-24 RX ADMIN — PHENYLEPHRINE HYDROCHLORIDE 100 MCG: 10 INJECTION INTRAVENOUS at 08:40

## 2020-09-24 RX ADMIN — MIDAZOLAM 2 MG: 1 INJECTION INTRAMUSCULAR; INTRAVENOUS at 08:00

## 2020-09-24 RX ADMIN — SODIUM CHLORIDE, POTASSIUM CHLORIDE, SODIUM LACTATE AND CALCIUM CHLORIDE: 600; 310; 30; 20 INJECTION, SOLUTION INTRAVENOUS at 08:00

## 2020-09-24 RX ADMIN — FENTANYL CITRATE 50 MCG: 50 INJECTION, SOLUTION INTRAMUSCULAR; INTRAVENOUS at 08:51

## 2020-09-24 RX ADMIN — ROCURONIUM BROMIDE 10 MG: 10 INJECTION INTRAVENOUS at 09:13

## 2020-09-24 RX ADMIN — FENTANYL CITRATE 50 MCG: 50 INJECTION, SOLUTION INTRAMUSCULAR; INTRAVENOUS at 09:01

## 2020-09-24 RX ADMIN — SODIUM CHLORIDE, POTASSIUM CHLORIDE, SODIUM LACTATE AND CALCIUM CHLORIDE: 600; 310; 30; 20 INJECTION, SOLUTION INTRAVENOUS at 08:33

## 2020-09-24 RX ADMIN — ONDANSETRON 4 MG: 2 INJECTION INTRAMUSCULAR; INTRAVENOUS at 09:28

## 2020-09-24 RX ADMIN — LIDOCAINE HYDROCHLORIDE 50 MG: 10 INJECTION, SOLUTION EPIDURAL; INFILTRATION; INTRACAUDAL; PERINEURAL at 08:05

## 2020-09-24 RX ADMIN — CEFAZOLIN 2 G: 330 INJECTION, POWDER, FOR SOLUTION INTRAMUSCULAR; INTRAVENOUS at 08:16

## 2020-09-24 RX ADMIN — PROPOFOL 200 MG: 10 INJECTION, EMULSION INTRAVENOUS at 08:05

## 2020-09-24 RX ADMIN — HYDROMORPHONE HYDROCHLORIDE 0.5 MG: 1 INJECTION, SOLUTION INTRAMUSCULAR; INTRAVENOUS; SUBCUTANEOUS at 10:20

## 2020-09-24 RX ADMIN — ROCURONIUM BROMIDE 50 MG: 10 INJECTION INTRAVENOUS at 08:05

## 2020-09-24 ASSESSMENT — PULMONARY FUNCTION TESTS
PIF_VALUE: 13
PIF_VALUE: 20
PIF_VALUE: 13
PIF_VALUE: 1
PIF_VALUE: 20
PIF_VALUE: 13
PIF_VALUE: 13
PIF_VALUE: 15
PIF_VALUE: 23
PIF_VALUE: 14
PIF_VALUE: 21
PIF_VALUE: 13
PIF_VALUE: 15
PIF_VALUE: 12
PIF_VALUE: 20
PIF_VALUE: 20
PIF_VALUE: 14
PIF_VALUE: 17
PIF_VALUE: 13
PIF_VALUE: 15
PIF_VALUE: 14
PIF_VALUE: 21
PIF_VALUE: 15
PIF_VALUE: 21
PIF_VALUE: 16
PIF_VALUE: 20
PIF_VALUE: 15
PIF_VALUE: 21
PIF_VALUE: 27
PIF_VALUE: 15
PIF_VALUE: 16
PIF_VALUE: 20
PIF_VALUE: 13
PIF_VALUE: 21
PIF_VALUE: 7
PIF_VALUE: 14
PIF_VALUE: 22
PIF_VALUE: 13
PIF_VALUE: 21
PIF_VALUE: 21
PIF_VALUE: 13
PIF_VALUE: 14
PIF_VALUE: 21
PIF_VALUE: 15
PIF_VALUE: 14
PIF_VALUE: 14
PIF_VALUE: 21
PIF_VALUE: 15
PIF_VALUE: 13
PIF_VALUE: 15
PIF_VALUE: 20
PIF_VALUE: 1
PIF_VALUE: 15
PIF_VALUE: 14
PIF_VALUE: 16
PIF_VALUE: 12
PIF_VALUE: 14
PIF_VALUE: 21
PIF_VALUE: 17
PIF_VALUE: 25
PIF_VALUE: 15
PIF_VALUE: 21
PIF_VALUE: 17
PIF_VALUE: 15
PIF_VALUE: 21
PIF_VALUE: 14
PIF_VALUE: 21
PIF_VALUE: 0
PIF_VALUE: 13
PIF_VALUE: 19
PIF_VALUE: 12
PIF_VALUE: 13
PIF_VALUE: 14
PIF_VALUE: 14
PIF_VALUE: 15
PIF_VALUE: 13
PIF_VALUE: 15
PIF_VALUE: 12
PIF_VALUE: 14
PIF_VALUE: 21
PIF_VALUE: 1
PIF_VALUE: 13
PIF_VALUE: 12
PIF_VALUE: 13
PIF_VALUE: 14
PIF_VALUE: 13
PIF_VALUE: 16
PIF_VALUE: 13
PIF_VALUE: 14
PIF_VALUE: 20
PIF_VALUE: 15
PIF_VALUE: 12
PIF_VALUE: 21
PIF_VALUE: 20
PIF_VALUE: 15
PIF_VALUE: 15
PIF_VALUE: 13
PIF_VALUE: 12
PIF_VALUE: 4
PIF_VALUE: 13
PIF_VALUE: 22
PIF_VALUE: 13
PIF_VALUE: 15
PIF_VALUE: 13
PIF_VALUE: 21
PIF_VALUE: 16
PIF_VALUE: 14
PIF_VALUE: 13
PIF_VALUE: 15
PIF_VALUE: 14
PIF_VALUE: 14
PIF_VALUE: 20
PIF_VALUE: 15
PIF_VALUE: 14
PIF_VALUE: 16
PIF_VALUE: 0
PIF_VALUE: 13
PIF_VALUE: 17

## 2020-09-24 ASSESSMENT — PAIN DESCRIPTION - PROGRESSION: CLINICAL_PROGRESSION: GRADUALLY IMPROVING

## 2020-09-24 ASSESSMENT — PAIN SCALES - GENERAL
PAINLEVEL_OUTOF10: 9
PAINLEVEL_OUTOF10: 0
PAINLEVEL_OUTOF10: 7
PAINLEVEL_OUTOF10: 9
PAINLEVEL_OUTOF10: 8
PAINLEVEL_OUTOF10: 8

## 2020-09-24 ASSESSMENT — PAIN DESCRIPTION - PAIN TYPE
TYPE: SURGICAL PAIN
TYPE: SURGICAL PAIN

## 2020-09-24 ASSESSMENT — PAIN DESCRIPTION - LOCATION
LOCATION: ABDOMEN
LOCATION: ABDOMEN

## 2020-09-24 ASSESSMENT — PAIN - FUNCTIONAL ASSESSMENT: PAIN_FUNCTIONAL_ASSESSMENT: 0-10

## 2020-09-24 ASSESSMENT — PAIN DESCRIPTION - ORIENTATION: ORIENTATION: MID

## 2020-09-24 ASSESSMENT — PAIN DESCRIPTION - DESCRIPTORS
DESCRIPTORS: ACHING;CRAMPING

## 2020-09-24 ASSESSMENT — PAIN DESCRIPTION - FREQUENCY: FREQUENCY: CONTINUOUS

## 2020-09-24 ASSESSMENT — LIFESTYLE VARIABLES: SMOKING_STATUS: 1

## 2020-09-24 NOTE — PROGRESS NOTES
Called to see pt regarding hematoma around infraumbilical incision. Upon entering room patient uncomfortable and states she has pain in umbilical region. Mother very irate stating she is taking her daughter home and she is not staying. Nurse present entire time. With the patients permission area examined. Dressing removed and cleaned. Bandage with serosanguinous drainage. No active bleeding noted. Sterile dressing placed. Patient received dilaudid as well as percocet. Patient refused to stay and mother stated whole time she is not staying and she is taking her home despite recommendations. Recommended to patient and mother that patient stay for observation for increased hematoma with serial H/H and ice. Both refused. Instructed nurse to obtain AMA form. Mother stated I am not signing any form. Pt wanted to sign but received pain medication. Mother started taking out her IV and getting her dressed. Risk of hemorrhage d/w pt & mother.

## 2020-09-24 NOTE — ANESTHESIA POSTPROCEDURE EVALUATION
Department of Anesthesiology  Postprocedure Note    Patient: Liv Flores  MRN: [id number]  YOB: 1975  Date of evaluation: 9/24/2020  Time:  11:00 AM     Procedure Summary     Date:  09/24/20 Room / Location:  [address] [place] [address] / [facility]: TEJINDER FLORES    Anesthesia Start:  0800 Anesthesia Stop:  1009    Procedures:       OPERATIVE LAPAROTOMY WITH RIGHT ADNEXECTOMY (N/A Abdomen)      URETERAL CATHETER INSERTION (Bilateral Ureter)      OPEN APPENDECTOMY (N/A Abdomen) Diagnosis:  (PELVIC PAIN APPENDICITIS)    Surgeon:  Manda Ruth DO; Lo Sanford MD; Edi Jean MD Responsible Provider:  Lucía Haywood MD    Anesthesia Type:  general ASA Status:  2          Anesthesia Type: general    Janell Phase I: Janell Score: 10    Janell Phase II:      Last vitals: Reviewed and per EMR flowsheets.        Anesthesia Post Evaluation    Comments: POST- ANESTHESIA EVALUATION       Pt Name: Liv Flores  MRN: [id number]  YOB: 1975  Date of evaluation: 9/24/2020  Time:  11:00 AM      /63   Pulse 72   Temp 97.1 °F (36.2 °C)   Resp 14   Ht 5' 6\" (1.676 m)   Wt 123 lb (55.8 kg)   LMP 02/26/2018   SpO2 93%   BMI 19.85 kg/m²      Consciousness Level  Awake  Cardiopulmonary Status  Stable  Pain Adequately Treated YES  Nausea / Vomiting  NO  Adequate Hydration  YES  Anesthesia Related Complications NONE      Electronically signed by Lucía Haywood MD on 9/24/2020 at 11:00 AM

## 2020-09-24 NOTE — PROGRESS NOTES
Pt came to short stay c/o lower abd  pain 8-9 on 10 scale. Evaluation of umbilical site with red drainage contained under Tegaderm. Lower abd. Appears slightly swollen and bruised. Site was marked and ice was applied. Pt was medicated with percocet as ordered. Upon Mothers arrival stating concern that pt's pain was not being addressed. Updated mother that pain medication was given and ice was applied. 1128 Dr. Xochitl Mccauley updated on pts abd and pain. She will call in house resident to evaluate pt.   1130 Dr. Osvaldo Molina in to evaluate pt. Changed umbilical dressing and evaluated abd. Talked with pt and pt's family member in regards to pain management. Family member   91 [phone] 06 Dr. Osvaldo Molina left unit, has call out to discuss with Dr. Crystal Augustine, She will call me back as to plan for pt.   1213 This nurse assessed abd. Bruising site and it appeared to have increased. Dr. Osvaldo Molina updated. Awaiting call back. Dr. Crystal Augustine. 1218 Pt's family member returns to room. Asked pt if she wanted to go home, pt stated \"yes\"  Family member wanting to just take pt home. Informed of waiting for further evaluation of abd bruising and drainage from resident. 1243 Site appears same, No increasing bruising, swelling. Drainage noted, not increasing. [address] Dr. Osvaldo Molina updated  She will call dr. Annia Jimenez. [phone]  [address]. Xochitl Mccauley calls, updated on pt . As stated above. Updated pt is tearful and pt's family member wanting to take her home. While on the phone with Dr. Xochitl Guajardo was calling her. Dr. Xochitl Mccauley to call me back. 56 Family member insisting on having \"AVS\" so they can go home. This nurse took discharge instructions in to pt's room. Family member grabbed signature sheet and signed. Stated she did not want to go over discharge instruction. 56 Dr. Xochitl Mccauley returned page, Updated pt's family member wanting to take pt. Home. Stated Dr. Andrzej Locke will be coming over to evaluate pt.    18 Dr. Gilbert Trevizo in to evaluate pt.  She changed umbilical dressing, putting new steri strips, folded 2x2 and tegaderm over. Pt very tearful and saying she wants to go home. Family member angrily verbal, using the \"F word multiple times and stating she just wants to take pt home. Dr. Lakesha Cooper informs that she would like to check a CBC and watch pt for a couple of more hours and offered another Percocet. Family member continued to state over and over she was taking her home. 200  Dr. Lakesha Cooper stated pt will be going home AMA. Pt's family member refusing to sign AMA and stating pt can not sigh d/t having pain meds. 1326 Pt taken out to car. Family member driving up to surgical pt  area aggressively yelling at pt to get in car. Pt was assisted to car and she was looking for her cigarettes, family member yelled aggressively \" I have your f---ing cigaretts up here\"  Hollered at pt to get into the car. While waiting for family member, this nurse reinforced with pt to watch bruising and to go to hospital if bruising and or pain becomes worse.

## 2020-09-24 NOTE — H&P
Gynecologic Surgery Pre-Operative Attestation Note:      Facility: [address] [place] [address]  Date: 2020  Time: 7:51 AM      Patient Name: Jaxson Alexandre  Patient : 1975  Room/Bed: [address] [place] [address] OR Pool/NONE  Admission Date/Time: 2020  5:14 AM  MRN #: [id number]  [facility] #: [id number]          Attending Physician Statement  I have discussed the care of Jaxson Alexandre, including pertinent history and exam findings,  with the resident. I have reviewed their note in the electronic medical record. I have seen and examined the patient in the pre-op holding area and the key elements of all parts of the encounter have been performed/reviewed by me . This was completed more than 15 minutes prior to the scheduled surgical start time per the new start time policy. I agree with the assessment, plan and orders as documented by the resident. The procedure risks and complications were discussed as well as the possibility of the need for a second surgery and incomplete removal of abnormal tissue. Vitals:    20 0617 20 0630   BP:  109/69   Pulse:  71   Resp:  16   Temp:  97.5 °F (36.4 °C)   TempSrc:  Infrared   SpO2:  100%   Weight: 123 lb (55.8 kg)    Height: 5' 6\" (1.676 m)          Hospital Outpatient Visit on 2020   Component Date Value Ref Range Status    SARS-CoV-2, ARMANDO 2020 Not Detected  Not Detected Final    Comment: (NOTE)  This nucleic acid amplification test was developed and its  performance characteristics determined by [facility]. Nucleic acid amplification tests include PCR and TMA. This test has  not been FDA cleared or approved. This test has been authorized by  FDA under an Emergency Use Authorization (EUA).  This test is only  authorized for the duration of time the declaration that  circumstances exist justifying the authorization of the emergency use  of in vitro diagnostic tests for detection of SARS-CoV-2 virus and/or  diagnosis of COVID-19 infection under section 564(b)(1) of the Act,  21 U. S.C. 978XDZ-2(C) (1), unless the authorization is terminated or  revoked sooner. When diagnostic testing is negative, the possibility of a false  negative result should be considered in the context of a patient's  recent exposures and the presence of clinical signs and symptoms  consistent with COVID-19. An individual without symptoms of COVID-  19 and who is not shedding SARS-CoV-2 vi                           magdiel would expect to have a  negative (not detected) result in this assay. Performed At: [address], [address] [place] [address] [facility] Outpatient Visit on 09/09/2020   Component Date Value Ref Range Status    Ventricular Rate 09/09/2020 69  BPM Final    Atrial Rate 09/09/2020 69  BPM Final    P-R Interval 09/09/2020 150  ms Final    QRS Duration 09/09/2020 78  ms Final    Q-T Interval 09/09/2020 410  ms Final    QTc Calculation (Bazett) 09/09/2020 439  ms Final    P Axis 09/09/2020 76  degrees Final    R Axis 09/09/2020 77  degrees Final    T Axis 09/09/2020 54  degrees Final    Specimen Description 09/09/2020 . CLEAN CATCH URINE   Final    Special Requests 09/09/2020 NOT REPORTED   Final    Culture 09/09/2020 NO SIGNIFICANT GROWTH   Final    Color, UA 09/09/2020 YELLOW  YELLOW Final    Turbidity UA 09/09/2020 CLOUDY* CLEAR Final    Glucose, Ur 09/09/2020 NEGATIVE  NEGATIVE Final    Bilirubin Urine 09/09/2020 NEGATIVE  NEGATIVE Final    Ketones, Urine 09/09/2020 NEGATIVE  NEGATIVE Final    Specific [place], UA 09/09/2020 1.005  1.000 - 1.030 Final    Urine Hgb 09/09/2020 NEGATIVE  NEGATIVE Final    pH, UA 09/09/2020 6.5  5.0 - 8.0 Final    Protein, UA 09/09/2020 NEGATIVE  NEGATIVE Final    Urobilinogen, Urine 09/09/2020 Normal  Normal Final    Nitrite, Urine 09/09/2020 NEGATIVE  NEGATIVE Final    Leukocyte Esterase, Urine 09/09/2020 NEGATIVE  NEGATIVE Final    Urinalysis Comments 09/09/2020 NOT REPORTED   Final    Expiration Date 09/09/2020 09/27/2020,2359   Final    Arm Band Number 09/09/2020 [id number]   Final    ABO/Rh 09/09/2020 A POSITIVE   Final    Antibody Screen 09/09/2020 NEGATIVE   Final    Blood Bank Comment 09/09/2020    Final                    Value:ABORH CONFIRMED AS A POS: 403  Results Verified      hCG Quant 09/09/2020 3  <5 IU/L Final    Comment:    Non-preg premeno   <=5  Postmeno           <=8  Male               <=3  If HCG results do not concur with clinical observations, additional testing to confirm   results is recommended. Elevated results not associated with pregnancy may be found in patients with other diseases   such as tumors of the germ cells (testis, ovaries, etc.), bladder, pancreas, stomach, lungs,   and liver.  Protime 09/09/2020 12.2  11.8 - 14.6 sec Final    INR 09/09/2020 0.9   Final    Comment:       Non-therapeutic Range:     INR = 0.9-1.2  Therapeutic Range:    Moderate Anticoagulant Intensity:     INR = 2.0-3.0   High Anticoagulant Intensity:     INR = 2.5-3.5            WBC 09/09/2020 7.1  3.5 - 11.0 k/uL Final    RBC 09/09/2020 4.23  4.0 - 5.2 m/uL Final    Hemoglobin 09/09/2020 13.6  12.0 - 16.0 g/dL Final    Hematocrit 09/09/2020 39.8  36 - 46 % Final    MCV 09/09/2020 94.2  80 - 100 fL Final    MCH 09/09/2020 32.2  26 - 34 pg Final    MCHC 09/09/2020 34.2  31 - 37 g/dL Final    RDW 09/09/2020 13.6  11.5 - 14.9 % Final    Platelets 56/86/5994 333  150 - 450 k/uL Final    MPV 09/09/2020 7.5  6.0 - 12.0 fL Final    NRBC Automated 09/09/2020 NOT REPORTED  per 100 WBC Final    Differential Type 09/09/2020 NOT REPORTED   Final    Seg Neutrophils 09/09/2020 53  36 - 66 % Final    Lymphocytes 09/09/2020 36  24 - 44 % Final    Monocytes 09/09/2020 8* 1 - 7 % Final    Eosinophils % 09/09/2020 3  0 - 4 % Final    Basophils 09/09/2020 0  0 - 2 % Final    Immature Granulocytes 09/09/2020 NOT REPORTED  0 % Final labs, consents have been completed. The history and physical as well as all supporting surgical documentation will be forwarded to the pre-operative holding area.     The patient is aware that this procedure may not alleviate her symptoms. That there may be a necessity for a second surgery and that there may be an incomplete removal of abnormal tissue.     See above for additional counseling completed                        Home care, Restrictions & Follow up Care review completed    Planned Office Follow up was reviewed with the patient and her family and is for  2 weeks. The patient will call to make this appointment unless she already has done so.

## 2020-09-24 NOTE — ANESTHESIA PRE PROCEDURE
Department of Anesthesiology  Preprocedure Note       Name:  Jose Levin   Age:  50 y.o.  :  1975                                          MRN:  [id number]         Date:  2020      Surgeon: Leodan Bell):  Lilia Ang MD Niels Cotta, MD    Procedure: Procedure(s):  LAPAROTOMY EXPLORATORY W/OOPHORECTOMY  URETERAL CATHETER INSERTION  OPEN APPENDECTOMY    Medications prior to admission:   Prior to Admission medications    Medication Sig Start Date End Date Taking? Authorizing Provider   ALPRAZolam Darren Lash) 1 MG tablet Take 1 tablet by mouth 2 times daily as needed for Sleep or Anxiety for up to 30 days. 9/10/20 10/10/20 Yes August Erp, APRN - CNP   Calcium Polycarbophil (FIBER-CAPS PO) Take 3 capsules by mouth daily   Yes Historical Provider, MD   POTASSIUM PO Take by mouth Unsure of dose   Yes Historical Provider, MD   traMADol (ULTRAM) 50 MG tablet Take 50 mg by mouth daily as needed for Pain.    Yes Historical Provider, MD   traZODone (DESYREL) 100 MG tablet Take 1 tablet by mouth nightly 9/3/20  Yes August Erp, APRN - CNP   fluticasone (FLONASE) 50 MCG/ACT nasal spray 2 sprays by Nasal route daily as needed 20  Yes Historical Provider, MD   diclofenac (VOLTAREN) 50 MG EC tablet Take 50 mg by mouth nightly as needed 20  Yes Historical Provider, MD   B Complex-C (SUPER B COMPLEX PO) Take by mouth   Yes Historical Provider, MD   Nutritional Supplements (ESTROVEN PO) Take by mouth   Yes Historical Provider, MD   triamcinolone (KENALOG) 0.1 % ointment Apply topically 2 times daily 19  Yes August Erp, APRN - CNP   megestrol (MEGACE) 40 MG/ML suspension Take 10 mLs by mouth daily 3/6/20   Historical Provider, MD       Current medications:    Current Facility-Administered Medications   Medication Dose Route Frequency Provider Last Rate Last Dose    lidocaine PF 1 % injection 1 mL  1 mL Intradermal Once PRN Mally Calderon MD        ceFAZolin (ANCEF) 2 g in liquid consumption: 09/23/20                        Date of last solid food consumption: 09/23/20    BMI:   Wt Readings from Last 3 Encounters:   09/24/20 123 lb (55.8 kg)   09/18/20 127 lb 12.8 oz (58 kg)   09/09/20 123 lb (55.8 kg)     Body mass index is 19.85 kg/m². CBC:   Lab Results   Component Value Date    WBC 7.1 09/09/2020    RBC 4.23 09/09/2020    HGB 13.6 09/09/2020    HCT 39.8 09/09/2020    MCV 94.2 09/09/2020    RDW 13.6 09/09/2020     09/09/2020       CMP:   Lab Results   Component Value Date     09/09/2020    K 4.1 09/09/2020    K 3.9 05/30/2018     09/09/2020    CO2 27 09/09/2020    BUN 9 09/09/2020    CREATININE 0.50 09/09/2020    GFRAA >60 09/09/2020    LABGLOM >60 09/09/2020    GLUCOSE 90 09/09/2020    PROT 5.7 05/30/2018    CALCIUM 9.4 09/09/2020    BILITOT 0.3 05/30/2018    ALKPHOS 72 05/30/2018    AST 16 05/30/2018    ALT 14 05/30/2018       POC Tests: No results for input(s): POCGLU, POCNA, POCK, POCCL, POCBUN, POCHEMO, POCHCT in the last 72 hours.     Coags:   Lab Results   Component Value Date    PROTIME 12.2 09/09/2020    INR 0.9 09/09/2020    APTT 28.3 09/09/2020    APTT 25.2 05/31/2018       HCG (If Applicable):   Lab Results   Component Value Date    PREGTESTUR negative 05/30/2018    HCGQUANT 3 09/09/2020        ABGs: No results found for: PHART, PO2ART, EZC5BDQ, NHC6OMH, BEART, U2QIMFMK     Type & Screen (If Applicable):  No results found for: LABABO, LABRH    Drug/Infectious Status (If Applicable):  No results found for: HIV, HEPCAB    COVID-19 Screening (If Applicable):   Lab Results   Component Value Date    COVID19 Not Detected 09/20/2020         Anesthesia Evaluation  Patient summary reviewed and Nursing notes reviewed no history of anesthetic complications:   Airway: Mallampati: III  TM distance: >3 FB   Neck ROM: full  Mouth opening: > = 3 FB Dental:    (+) upper dentures  Comment: States dentures will not come out    Pulmonary:normal exam  breath sounds clear to auscultation  (+) current smoker                           Cardiovascular:Negative CV ROS  Exercise tolerance: good (>4 METS),           Rhythm: regular  Rate: normal                    Neuro/Psych:   (+) psychiatric history:depression/anxiety             GI/Hepatic/Renal:            ROS comment: Pelvic pain  appendicitis. Endo/Other: Negative Endo/Other ROS                    Abdominal:           Vascular: negative vascular ROS. Anesthesia Plan      general     ASA 2     (Refuses spinal  Has upper dentures - refuses to remove, she understands risks of damage to dentures if they stay in)  Induction: intravenous. MIPS: Postoperative opioids intended and Prophylactic antiemetics administered. Anesthetic plan and risks discussed with patient. Plan discussed with CRNA.                 Maksim Dobbs MD   9/24/2020

## 2020-09-24 NOTE — DISCHARGE INSTR - OTHER ORDERS
1. Operative Laparoscopy with Right Adnexectomy  2. Laparoscopic Appendectomy  3.  Insertion and removal of bilateral ureteral stents

## 2020-09-24 NOTE — OP NOTE
Gynecologic Operative Note      NAME: Marina العلي   MRN: [id number]  CSN: [id number]  : 1975    PROCEDURE DATE: 2020     Pre-op Diagnosis: PELVIC PAIN HX Endometriosis. HX LSH LSO     Post-op Diagnosis: Same;     Procedure: Procedure(s):  Operative Laparoscopy-Right Adnexectomy  URETERAL CATHETER INSERTION-Urology Dr. Terri Trujillo  Laparoscopic Appendectomy-Surgery Dr. Andrea Smalls    Surgeon: Tony Castorena DO    Assistant:   1. Elise Client DO  2.  Eliceo Thorpe DO      Circulator Documentation of Staff:  Surgeon(s) and Role:  Panel 1:     * Tony Castorena DO - Primary     * Elise Collado DO - Assisting  Panel 2:     * Jason Guzmán MD - Primary  Panel 3:     * Inés Beckham MD - Primary    OR Staff:  Scrub Person First: Cat Side Fought; Jalyn Pace      Anesthesia Type: General  Anesthesia Staff: Anesthesiologist: David Perez MD  CRNA: Eddie Villareal APRN - CRNA      Complications: None      Estimated Blood Loss: less than 5 ml ml  Total IV Fluids:  1200 ml  Urine Output: 20 ml clear      Specimens:   ID Type Source Tests Collected by Time Destination   A :  Tissue Ovary SURGICAL PATHOLOGY Tony Castorena DO 2020 0914      Implants: * No implants in log *    Drains:   Urethral Catheter Non-latex (Active)       Ureteral Catheter Left ureter 5 fr (Active)       Ureteral Catheter Right ureter 5 fr (Active)         Condition: Stable    Findings: Right ovary without any specific identifiable pathology. IP ligament was free from sidewall and accessible. Ureter was inferior to pedicle. Appendix was scarred. Upper abdomen was limited without any identifiable pathology. Description of Procedure: (Understanding of limitations from template op-reports exist)  The patient was seen in the pre-operative area. The procedure risk and complications were reviewed. The consent , labs , and H&P were reviewed. The patient had all of her questions answered.   The patient was moved to the operative suite where she was placed under general anesthesia by the anesthesiologist.  Time out was preformed. The patient was placed in the supine position. A sterile prep and drape was completed with a 3 minute delay before draping the patient. Urology entered the room and completed the insertion of the bilateral ureteral stents. The patient was re-prepped and a moseley catheter was placed with the prep. An elliptical infraumbilical skin incision was made. The tissue was dissected to the level of the fascia. Using \"S\" retractors the fascia was visualized. It was then grasped with kocher's x 2. The fascia was tented and then incised and tagged with two \"O\" vicryl lateral STAY sutures. Digital dissection was utilized to enter the peritoneal cavity, the \"S\" retractors were re-approximated. Utilizing a blunt tipped Saeed trochar #10, this was introduced and secured to the STAY sutures. CO2 gas was used to create a pneumoperitoneum to 12 mm hg. Trendelenburg was produced. The #10 straight laparoscope was placed and underlying structures were noted to be without trauma. A second port was placed two fingersbreadth above the pubic bone in the midline with transillumination under direct laparoscopic guidance. A 5 mm blade less trocar was utilized. It was placed without difficulty or trauma to any structures. It was hemostatic. Visualization of the pelvis and upper abdomen was completed, Intra-operative video was taken. OPERATIVE:  Under direct Laparoscopic guidance an additional 12 mm port was placed with direct visualization and transillumination. Excellent hemostasis was noted. It was 5 fingers breath lateral and 1 cm inferior to the infraumbilical port. .  Utilizing the ENDO-MIMI VASCULAR Instrument attention was directed to right IP pedicle this was on tension with a grasper from the suprapubic port grasper. The endo-MIMI was initialized and the overy was free from its pedicle.  The site had a small amount of oozing from the mid aspect and an endo-loop, \"O\" Vicryl was placed to secure the site. An endo bag was utilized to remove the ovary intact and without difficulty. Hemostasis was achieved. The ureter was again identified inferiorly to the pedicle and there was peristalsis. All specimens removed will be sent to pathology. The operative portion of the procedure completed. Excellent hemostasis was noted. Dr. Thu Garcia entered the room to complete her portion of the procedure. See separate OP report. She agreed to close all ports once she completed her procedure. The stents were to be removed at the completion of the procedure. Intact tip to marie. The patient tolerated the procedure well and was taken to PACU in stable condition. Sponge, needle and instrument counts were called for and found to be correct x 3. Disposition:  The patient will return to my office in 2 weeks. We will review her pathology report and her intra-operative video. Recommendations for restrictions will be discussed. She was counseled with her family that she is to report any temperature more than 100.4 F, pelvic pain, or heavy vaginal bleeding; She is to refrain from intercourse douching or tampons; No hot tubs baths lakes or pools. The patient voiced understanding of the above counseling.       Electronically signed by Yanira Lowe DO on 9/24/20 at 9:30 AM EDT

## 2020-09-24 NOTE — OP NOTE
Operative Note      Patient: Jez Julian  YOB: 1975  MRN: [id number]    Date of Procedure: 9/24/2020    Pre-Op Diagnosis: PELVIC PAIN, RLQ pain    Post-Op Diagnosis: Same       Procedure:  Laparoscopic appendectomy    Surgeon(s):  Toribio Sacks, DO Ary Islam, DO Lise Darting, MD Edythe Pace, MD    Assistant:   Resident: Porter Lauren DO    Anesthesia: General    Estimated Blood Loss (mL): less than 50     Complications: None    Specimens:   ID Type Source Tests Collected by Time Destination   A :  Tissue Ovary SURGICAL [address], DO 9/24/2020 0914     appendix    Implants: none      Drains: none    Findings: no evidence of acute appendicitis      HISTORY: The patient is a 50y.o. year old female with history of above preop diagnosis. I explained the risk, benefits, expected outcome, and alternatives to the procedure. Patient understands and is in agreement. PROCEDURE: The first part of the procedure was done by Dr Luz Elena Vuong and the three ports are already in. There is a Saeed port in the infraumbilical region and a 12 port in the LLQ and a 5 mm port in the suprapubic region. The appendix is dissected out with prestigue graspers and a yvonne dissector. The base of the appendix is  from the mesoappendix. The camera is switched to a 5 mm one and the endo MIMI stapler with a standard load is used to staple across the base of the appendix. The mesoappendix is taken with a vascular load on the endo MIMI. The appendix is removed from the LLQ port. There was no bleeding or leaking from the base. The cannulas were removed under direct vision with no bleeding. The pneumoperitoneum was released then the umbilical cannula removed. A figure of eight 0 Vicryl suture was placed in the fascial opening of the supraumbilical incision tied  securely tied. The first vicryl sutures are removed.   The skin incisions were closed with interrupted 4-0 Monocryl subcuticular sutures. Steristrips are applied. The sponge and needle count were correct. The patient tolerated the procedure well,  Was extubated and was sent to PACU in stable condition.       Electronically signed by Edi Jean MD on 9/24/2020 at 9:39 AM

## 2020-09-24 NOTE — OP NOTE
Operative Note      Patient: Lexi March  YOB: 1975  MRN: [id number]    Date of Procedure: 9/24/2020    Pre-Op Diagnosis: PELVIC PAIN APPENDICITIS, need for intraoperative ureteral identification. Post-Op Diagnosis: Same       Procedure: Cystoscopy with bilateral ureteral catheterization    Surgeon: Norris Nelson MD    Anesthesia: General    Estimated Blood Loss (mL): Minimal    Complications: None    Specimens none  Implants:  * No implants in log *      Drains:   Urethral Catheter Non-latex (Active)       Ureteral Catheter Left ureter 5 fr (Active)       Ureteral Catheter Right ureter 5 fr (Active)       Findings: Uneventful placement of bilateral ureteral catheters and Masterson catheter    Detailed Description of Procedure: The patient was brought to the operating room. She was properly identified. She was administered a general anesthetic. A proper timeout was performed. She was placed in modified dorsolithotomy position and prepped and draped in sterile fashion. A rigid cystoscope was introduced into her bladder. The bladder was surveyed in its entirety. There were no tumors, diverticuli, evacuations, or stones noted. Both ureteral orifice ease appeared orthotopic and normal.  I did cannulate both ureteral orifice ease with 5 Western Rosibel open-ended ureteral catheters. Both catheters passed into both kidneys with no resistance or difficulty. At this point the cystoscope was removed leaving the catheters in place. A 16 Austrian Masterson catheter was placed in the balloon inflated and pulled to the bladder neck. I then secured the ureteral catheters to the Masterson catheter with umbilical tape. All catheters were attached to a drainage bag and the procedure was terminated. The plan for the patient is to have her ureteral catheters removed by Dr. Tevin Valle at the termination of his exploratory laparotomy.     Electronically signed by Oleksandr Hernández MD on 9/24/2020 at 9:53 AM

## 2020-09-28 LAB — SURGICAL PATHOLOGY REPORT: NORMAL

## 2020-09-29 RX ORDER — TRAZODONE HYDROCHLORIDE 100 MG/1
100 TABLET ORAL NIGHTLY
Qty: 30 TABLET | Refills: 0 | Status: SHIPPED | OUTPATIENT
Start: 2020-09-29 | End: 2020-10-09 | Stop reason: SDUPTHER

## 2020-10-01 ENCOUNTER — TELEPHONE (OUTPATIENT)
Dept: PRIMARY CARE CLINIC | Age: 45
End: 2020-10-01

## 2020-10-01 RX ORDER — FLUTICASONE PROPIONATE 50 MCG
2 SPRAY, SUSPENSION (ML) NASAL DAILY
Qty: 1 BOTTLE | Refills: 0 | Status: SHIPPED | OUTPATIENT
Start: 2020-10-01 | End: 2021-08-19 | Stop reason: SDUPTHER

## 2020-10-01 RX ORDER — GUAIFENESIN 600 MG/1
600 TABLET, EXTENDED RELEASE ORAL 2 TIMES DAILY
Qty: 30 TABLET | Refills: 0 | Status: SHIPPED | OUTPATIENT
Start: 2020-10-01 | End: 2020-10-16

## 2020-10-01 NOTE — TELEPHONE ENCOUNTER
Two days in it is likely viral. Recommend mucinex and flonase.  Mucinex and flonase sent to [facility]

## 2020-10-01 NOTE — TELEPHONE ENCOUNTER
Pt calling to request a ABX for her severe sinus infection. She states that since Tuesday she has had a HA on the right side  That travels down to her nose, eyes and cheeck. States that she woke up last night with congestion in her right nostril. Denied any other pain. Please advise.        NOHEMY in [facility]

## 2020-10-09 ENCOUNTER — TELEMEDICINE (OUTPATIENT)
Dept: OBGYN CLINIC | Age: 45
End: 2020-10-09

## 2020-10-09 ENCOUNTER — OFFICE VISIT (OUTPATIENT)
Dept: PRIMARY CARE CLINIC | Age: 45
End: 2020-10-09
Payer: COMMERCIAL

## 2020-10-09 VITALS
HEART RATE: 93 BPM | RESPIRATION RATE: 16 BRPM | OXYGEN SATURATION: 98 % | WEIGHT: 123.4 LBS | HEIGHT: 66 IN | BODY MASS INDEX: 19.83 KG/M2 | DIASTOLIC BLOOD PRESSURE: 88 MMHG | SYSTOLIC BLOOD PRESSURE: 132 MMHG | TEMPERATURE: 96.8 F

## 2020-10-09 PROCEDURE — G8427 DOCREV CUR MEDS BY ELIG CLIN: HCPCS | Performed by: NURSE PRACTITIONER

## 2020-10-09 PROCEDURE — 90715 TDAP VACCINE 7 YRS/> IM: CPT | Performed by: NURSE PRACTITIONER

## 2020-10-09 PROCEDURE — G8420 CALC BMI NORM PARAMETERS: HCPCS | Performed by: NURSE PRACTITIONER

## 2020-10-09 PROCEDURE — 99024 POSTOP FOLLOW-UP VISIT: CPT | Performed by: OBSTETRICS & GYNECOLOGY

## 2020-10-09 PROCEDURE — G8482 FLU IMMUNIZE ORDER/ADMIN: HCPCS | Performed by: NURSE PRACTITIONER

## 2020-10-09 PROCEDURE — 4004F PT TOBACCO SCREEN RCVD TLK: CPT | Performed by: NURSE PRACTITIONER

## 2020-10-09 PROCEDURE — 90472 IMMUNIZATION ADMIN EACH ADD: CPT | Performed by: NURSE PRACTITIONER

## 2020-10-09 PROCEDURE — 90686 IIV4 VACC NO PRSV 0.5 ML IM: CPT | Performed by: NURSE PRACTITIONER

## 2020-10-09 PROCEDURE — 90471 IMMUNIZATION ADMIN: CPT | Performed by: NURSE PRACTITIONER

## 2020-10-09 PROCEDURE — 99214 OFFICE O/P EST MOD 30 MIN: CPT | Performed by: NURSE PRACTITIONER

## 2020-10-09 RX ORDER — TRAZODONE HYDROCHLORIDE 150 MG/1
150 TABLET ORAL NIGHTLY
Qty: 30 TABLET | Refills: 0 | Status: SHIPPED | OUTPATIENT
Start: 2020-10-09 | End: 2020-11-05 | Stop reason: SDUPTHER

## 2020-10-09 RX ORDER — ALPRAZOLAM 1 MG/1
1 TABLET ORAL 2 TIMES DAILY PRN
Qty: 60 TABLET | Refills: 0 | Status: SHIPPED | OUTPATIENT
Start: 2020-10-09 | End: 2020-11-05 | Stop reason: SDUPTHER

## 2020-10-09 RX ORDER — BUTALBITAL, ACETAMINOPHEN AND CAFFEINE 50; 325; 40 MG/1; MG/1; MG/1
1 TABLET ORAL EVERY 6 HOURS PRN
Qty: 60 TABLET | Refills: 0 | Status: SHIPPED | OUTPATIENT
Start: 2020-10-09 | End: 2020-12-23 | Stop reason: SDUPTHER

## 2020-10-09 ASSESSMENT — ENCOUNTER SYMPTOMS
VOMITING: 0
WHEEZING: 0
EYE REDNESS: 0
SORE THROAT: 0
EYE DISCHARGE: 0
ABDOMINAL PAIN: 0
RHINORRHEA: 0
CONSTIPATION: 0
SHORTNESS OF BREATH: 0
COUGH: 1
NAUSEA: 0
DIARRHEA: 0

## 2020-10-09 ASSESSMENT — PATIENT HEALTH QUESTIONNAIRE - PHQ9
SUM OF ALL RESPONSES TO PHQ QUESTIONS 1-9: 0
SUM OF ALL RESPONSES TO PHQ9 QUESTIONS 1 & 2: 0
2. FEELING DOWN, DEPRESSED OR HOPELESS: 0
1. LITTLE INTEREST OR PLEASURE IN DOING THINGS: 0
SUM OF ALL RESPONSES TO PHQ QUESTIONS 1-9: 0

## 2020-10-09 NOTE — PROGRESS NOTES
Cathy Salinas  10/9/2020  8:57 AM      Cathy Salinas (:  1975) has requested an audio/video evaluation for the following concern(s):    1. Postop check    2. S/P Operative Laparoscopy with Right adnexectomy 2020          TELEHEALTH EVALUATION -- Audio/Visual ([facility])        Cathy Salinas  Procedure:   PROCEDURE DATE: 2020      Pre-op Diagnosis: PELVIC PAIN HX Endometriosis. HX LSH LSO     Post-op Diagnosis: Same;      Procedure: Procedure(s):  Operative Laparoscopy-Right Adnexectomy  URETERAL CATHETER INSERTION-Urology Dr. Ilsa Garcia  Laparoscopic Appendectomy-Surgery Dr. Judith Miranda is a 50 y.o. female [id number]      She denies any complaints. She denied any shortness of breath, chest pain or dizziness. She denied any nausea, vomiting, or diarrhea. There is no fever, chills, or rigors. The patient denies any vaginal bleeding, discharge or odor. All of her pre-operative complaints are now resolved. Last menstrual period 2018, not currently breastfeeding. The PE is limited due to the Virtual Visit    Abdominal Exam: soft non-tender. Good bowel sounds. No guarding, rebound or rigidity. No costal vertebral angle tenderness bilateral. No hernias. Evaluation with patient participation and visualization-(Viewed Virtually)    Incision: healing well, no drainage, no erythema, no hernia, no seroma, no swelling, well approximated, x 3 (by video confirmation) (Viewed Virtually)    Extremities: No edema or calf pain noted bilaterally. Patient participation.  (Viewed Virtually)    Pelvic Exam: Virtual visit-not completed      Results for orders placed or performed during the hospital encounter of 20   Covid-19 Ambulatory   Result Value Ref Range    SARS-CoV-2, ARMANDO Not Detected Not Detected     2020  3:32 PM - Escobar, Claudio Incoming Lab Results From [facility]     Component  Collected  Lab    Surgical Pathology Report  2020  9:14 AM  [facility]- [facility]    [id number]   [address]. [place], [address] [facility]   [phone]   Fax: [phone]   SURGICAL PATHOLOGY REPORT     Patient Name: Garwin Goodell   MR#: [id number]   Specimen #[id number]         Final Diagnosis   Part A. Ovary, right, oophorectomy:  - Benign ovarian parenchyma with   luteal cysts. Part B. Appendix, appendectomy:  - Appendix with luminal fibrosis and   a few eosinophils. Diagnosis Comment   Part A. An amendment was made to the diagnostic desciptor of the   procedure type. Original procedure for Part A transcribed as: \"nephrectomy\"   Changed to the amended procedure: \"oophorectomy\"       Garlon Fothergill, M.D.   **Electronically Signed Out**         vvg/9/25/2020     Clinical Information   Pelvic pain, appendicitis; laparotomy exploratory w/oophorectomy; open   appendectomy; formalin time: A) 9:15, B) 9:47     Source:   A: Ovary (\"right\" per Colonel Barley in surgery)   B: Appendix     Gross Description   The specimen is received in two containers labeled A and B. Part A. Labeled \"ovary\".  Received in formalin is an ovary measuring 4   x 1.7 x 1.2 cm.  The exterior is tan-yellow with a septated   appearance.  Sectioning reveals a variegated parenchyma with corpora   albicantia.  A 0.5 cm thin-walled cyst is present containing clear,   watery contents.  The inner lining of the cyst has no solid   projections.  Representative sections are submitted in one cassette   labeled A. Part B. Labeled \"appendix\".  Received in formalin is an appendix   measuring 6 cm in length by 0.6 cm in diameter.  The exterior is   tan-pink with tortuous vessels.  Sectioning reveals the appendiceal   wall to measure 0.2 cm.  The lumen is patent. Mike Spatz is no gross   evidence of perforation.  There are staples at the proximal margin.    The mesoappendix (5 x 1.3 cm) shows focal congestion.  Representative   sections are submitted in two cassettes labeled B1 and B2 as follows:          Cassette B1: Proximal margin        Cassette B2: Tip and cross sections         Microscopic Description   Parts A & B. Three total H&E slides reviewed.  Microscopic   examination performed and supports the diagnostic impression. Assessment:      Diagnosis Orders   1. Postop check     2. S/P Operative Laparoscopy with Right adnexectomy 9/24/2020          Patient Active Problem List    Diagnosis Date Noted    S/P Operative Laparoscopy with Right adnexectomy 9/24/2020 09/24/2020     Priority: High     Laparoscopic Appendectomy 9/24/2020 Dr. Edward Ramsay Hx of hysterectomy with LSO (laparoscopic) 09/01/2020     Priority: High    Endometriosis of pelvic peritoneum 09/01/2020     Priority: High    Pelvic pain 09/01/2020     Priority: High    Hypotension 05/31/2018     Priority: Medium    S/P Laparoscopic Appendectomy 9/24/20 09/24/2020          POD# 15   Procedure: Lscopic adnexectomy and appendectomy   Stable   Pathology reviewed     Plan:   Return in about 4 weeks (around 11/6/2020) for Follow-Up On Current Problem. Continue with restrictions. Pelvic rest. No lifting or intercourse. No baths or pools. No douching or tampons. Return to office 4 weeks      Radha Del Castillo is a 50 y.o. female female was evaluated by a Virtual Visit (video visit) encounter to address concerns as mentioned above. A caregiver was present when appropriate. Due to this being a TeleHealth encounter (During [facility]- public health emergency), evaluation of the following organ systems was limited: Vitals/Constitutional/EENT/Resp/CV/GI//MS/Neuro/Skin/Heme-Lymph-Imm.   Pursuant to the emergency declaration under the [address] [place] [address], [address] [place] [address] authority and the [employer] Act, this Virtual Visit was conducted with patient's (and/or legal guardian's) consent, to reduce the patient's risk of exposure to COVID-19 and provide necessary medical care. The patient (and/or legal guardian) has also been advised to contact this office for worsening conditions or problems, and seek emergency medical treatment and/or call 911 if deemed necessary. Services were provided through a video synchronous discussion virtually to substitute for in-person clinic visit. Patient and provider were located at their individual homes. Electronically signed by Rudy Ross DO on 10/9/20 at 8:53 AM EDT     An electronic signature was used to authenticate this note. The Virtual Visit time of 15 minutes. More than 50% of this visit was on counseling and education regarding her    Diagnosis Orders   1. Postop check     2. S/P Operative Laparoscopy with Right adnexectomy 9/24/2020      and her options. She was also counseled on her preventative health maintenance recommendations and follow-up.

## 2020-10-09 NOTE — PATIENT INSTRUCTIONS
Fioricet every 6 hours as needed for headache  Increase trazodone to 150 mg nightly as needed for insomnia  Continue Alprazolam for anxiety  Flu vaccine today  Tdap vaccine today.

## 2020-11-06 RX ORDER — TRAZODONE HYDROCHLORIDE 150 MG/1
150 TABLET ORAL NIGHTLY
Qty: 30 TABLET | Refills: 0 | Status: SHIPPED | OUTPATIENT
Start: 2020-11-06 | End: 2020-12-03 | Stop reason: SDUPTHER

## 2020-11-06 RX ORDER — ALPRAZOLAM 1 MG/1
1 TABLET ORAL 2 TIMES DAILY PRN
Qty: 60 TABLET | Refills: 0 | Status: SHIPPED | OUTPATIENT
Start: 2020-11-06 | End: 2020-12-03 | Stop reason: SDUPTHER

## 2020-11-10 ENCOUNTER — OFFICE VISIT (OUTPATIENT)
Dept: OBGYN CLINIC | Age: 45
End: 2020-11-10
Payer: COMMERCIAL

## 2020-11-10 VITALS
BODY MASS INDEX: 20.25 KG/M2 | RESPIRATION RATE: 18 BRPM | TEMPERATURE: 98.2 F | HEIGHT: 66 IN | DIASTOLIC BLOOD PRESSURE: 68 MMHG | HEART RATE: 93 BPM | WEIGHT: 126 LBS | SYSTOLIC BLOOD PRESSURE: 114 MMHG

## 2020-11-10 PROCEDURE — G8420 CALC BMI NORM PARAMETERS: HCPCS | Performed by: OBSTETRICS & GYNECOLOGY

## 2020-11-10 PROCEDURE — 99213 OFFICE O/P EST LOW 20 MIN: CPT | Performed by: OBSTETRICS & GYNECOLOGY

## 2020-11-10 PROCEDURE — G8482 FLU IMMUNIZE ORDER/ADMIN: HCPCS | Performed by: OBSTETRICS & GYNECOLOGY

## 2020-11-10 PROCEDURE — 4004F PT TOBACCO SCREEN RCVD TLK: CPT | Performed by: OBSTETRICS & GYNECOLOGY

## 2020-11-10 PROCEDURE — G8427 DOCREV CUR MEDS BY ELIG CLIN: HCPCS | Performed by: OBSTETRICS & GYNECOLOGY

## 2020-11-10 RX ORDER — MULTIVIT/FOLIC ACID/HERBAL 223 400 MCG
1 TABLET ORAL DAILY
Qty: 30 TABLET | Refills: 6 | Status: SHIPPED | OUTPATIENT
Start: 2020-11-10 | End: 2020-12-23 | Stop reason: SDUPTHER

## 2020-11-10 NOTE — PROGRESS NOTES
Jermaine Leyva  11/10/2020      Jermaine Leyva is a 50 y.o. female [id number]      The patient was seen today. She was here to follow-up regarding her labs and diagnostics ordered at her last visit for the diagnosis of:    ICD-10-CM    1. Menopausal hot flushes  N95.1 Black Cohosh-Soy-Ginkgo-Magnol (ESTROVEN MOOD & MEMORY) TABS   2. Smoker  F17.200        She has night sweats interrupting her sleep. She is a smoker and is requesting a refill of her otc med Her bowels are regular and she is voiding without difficulty. I offered her SSRI/Clonidine and other options. She wishes Estroven OTC and would like an rx.       Past Medical History:   Diagnosis Date    Endometriosis     Fibroid tumor 2017    Ovarian cyst     Wears dentures     uppers         Past Surgical History:   Procedure Laterality Date    BREAST SURGERY  2004    lumpectomy of the L     HYSTERECTOMY      cervix was taken    LAPAROSCOPIC APPENDECTOMY N/A 9/24/2020    APPENDECTOMY, LAPAROSCOPIC performed by Shanthi Dorado MD at [address] N/A 9/24/2020    OPERATIVE LAPAROSCOPY WITH RIGHT ADNEXECTOMY performed by Lynne Nascimento DO at [address], ureteral Bilateral 9/24/2020    URETERAL CATHETER INSERTION performed by Doris Guerra MD at [address] EXTRACTION           Family History   Problem Relation Age of Onset    Colon Cancer Paternal Grandfather          Social History     Tobacco Use    Smoking status: Current Every Day Smoker     Packs/day: 0.25     Years: 8.00     Pack years: 2.00     Types: Cigarettes    Smokeless tobacco: Never Used   Substance Use Topics    Alcohol use: No     Comment: social    Drug use: Not Currently     Frequency: 7.0 times per week     Types: Marijuana         MEDICATIONS:  Current Outpatient Medications   Medication Sig Dispense Refill    Black Cohosh-Soy-Ginkgo-Magnol (ESTROVEN MOOD & MEMORY) TABS Take 1 tablet by mouth daily 30 tablet 6    traZODone (DESYREL) 150 MG tablet Take 1 tablet by mouth nightly 30 tablet 0    ALPRAZolam (XANAX) 1 MG tablet Take 1 tablet by mouth 2 times daily as needed for Sleep or Anxiety for up to 30 days. 60 tablet 0    butalbital-acetaminophen-caffeine (FIORICET, ESGIC) -40 MG per tablet Take 1 tablet by mouth every 6 hours as needed for Headaches 60 tablet 0    fluticasone (FLONASE) 50 MCG/ACT nasal spray 2 sprays by Each Nostril route daily 1 Bottle 0    diclofenac (VOLTAREN) 50 MG EC tablet Take 50 mg by mouth nightly as needed      Nutritional Supplements (ESTROVEN PO) Take by mouth      megestrol (MEGACE) 40 MG/ML suspension Take 10 mLs by mouth daily      triamcinolone (KENALOG) 0.1 % ointment Apply topically 2 times daily 1 Tube 3    B Complex-C (SUPER B COMPLEX PO) Take by mouth       No current facility-administered medications for this visit. ALLERGIES:  Allergies as of 11/10/2020 - Review Complete 11/10/2020   Allergen Reaction Noted    Diphenhydramine Itching 06/01/2018    Sleep aid [diphenhydramine hcl (sleep)]  12/30/2019         Blood pressure 114/68, pulse 93, temperature 98.2 °F (36.8 °C), resp. rate 18, height 5' 6\" (1.676 m), weight 126 lb (57.2 kg), last menstrual period 02/26/2018, not currently breastfeeding. Abdomen: Soft non-tender; good bowel sounds. No guarding, rebound or rigidity. No CVA tenderness bilaterally. Extremities: No calf tenderness, DTR 2/4, and No edema bilaterally    Pelvic: Declined         Diagnostics:  No results found. Lab Results:  Results for orders placed or performed during the hospital encounter of 09/20/20   Covid-19 Ambulatory   Result Value Ref Range    SARS-CoV-2, ARMANDO Not Detected Not Detected       Counseling:  Counseling on Hormone Replacement Therapy: The patient was seen and counseled on all forms of Hormone Replacement Therapy and NON hormonal-based options that would alleviate menopausal symptoms.  She is aware that hormonal based therapy may increase her risk of developing a blood clot which may increase her morbidity and or mortality. She was counseled on alternate non hormonal based treatment options that do NOT carry the same thromboembolic risks. We discussed that smoking and any hormonal based treatment will increase the patients risks of developing these life threatening blood clots. All patients are encouraged to stop smoking at the time of medical management counseling. Cessation programs were reviewed. The patient was instructed to use barrier contraception for sexually transmitted disease prevention. The life threatening side effect profile was reviewed in detail, this includes but is not limited to shortness of breath, chest pain, severe or persistent headaches, or calf pain. If any of these occur the patient has been instructed to stop using her hormonal based medical management therapy, notify the office, and go to the emergency department or call 911. The patient denied any personal history of blood clots in her leg, lung, or heart and denied any family history of stroke, TIA, sudden cardiac death < 36 y.o.,pulmonary embolism, or deep venous thrombosis. The patient was also counseled on the potentiation of any undiagnosed malignancies that she may have. These malignancies may progress at a more rapid rate when exposed to the patients hormonal therapy. This advancing growth may cause the patient increased morbidity and or mortality. The patient was instructed to stop smoking. Morbidity, mortality, and cessation programs were reviewed. Worsening of long and short term medical health risks were discussed with the addition of tobacco. Secondary smoke risks were reviewed with respect to children and newborns. Increases in Sudden Infant Death Syndrome, asthma, respiratory problems, and cancers were reviewed. Assessment:   Diagnosis Orders   1.  Menopausal hot flushes  Black Cohosh-Soy-Ginkgo-Magnol (ESTROVEN MOOD & MEMORY) TABS   2. Smoker       Chief Complaint   Patient presents with    Follow-up         Patient Active Problem List    Diagnosis Date Noted    S/P Operative Laparoscopy with Right adnexectomy 9/24/2020 09/24/2020     Priority: High     Laparoscopic Appendectomy 9/24/2020 Dr. Chris Pompa Hx of hysterectomy with LSO (laparoscopic) 09/01/2020     Priority: High    Endometriosis of pelvic peritoneum 09/01/2020     Priority: High    Pelvic pain 09/01/2020     Priority: High    Hypotension 05/31/2018     Priority: Medium    S/P Laparoscopic Appendectomy 9/24/20 09/24/2020       PLAN:  Return in about 6 months (around 5/10/2021) for Annual.  Davie Anis on preventative health maintenance follow-up. No orders of the defined types were placed in this encounter. Patient was seen with total face to face time of 15 minutes. More than 50% of this visit was counseling and education regarding The primary encounter diagnosis was Menopausal hot flushes. A diagnosis of Smoker was also pertinent to this visit. and Follow-up   as well as  counseling on preventative health maintenance follow-up.

## 2020-12-03 RX ORDER — TRAZODONE HYDROCHLORIDE 150 MG/1
150 TABLET ORAL NIGHTLY
Qty: 30 TABLET | Refills: 0 | Status: SHIPPED | OUTPATIENT
Start: 2020-12-03 | End: 2020-12-29 | Stop reason: SDUPTHER

## 2020-12-03 RX ORDER — ALPRAZOLAM 1 MG/1
1 TABLET ORAL 2 TIMES DAILY PRN
Qty: 60 TABLET | Refills: 0 | Status: SHIPPED | OUTPATIENT
Start: 2020-12-03 | End: 2020-12-29 | Stop reason: SDUPTHER

## 2020-12-23 RX ORDER — MULTIVIT/FOLIC ACID/HERBAL 223 400 MCG
1 TABLET ORAL DAILY
Qty: 30 TABLET | Refills: 6 | Status: SHIPPED | OUTPATIENT
Start: 2020-12-23 | End: 2021-07-12

## 2020-12-24 RX ORDER — BUTALBITAL, ACETAMINOPHEN AND CAFFEINE 50; 325; 40 MG/1; MG/1; MG/1
1 TABLET ORAL EVERY 6 HOURS PRN
Qty: 60 TABLET | Refills: 0 | Status: SHIPPED | OUTPATIENT
Start: 2020-12-24 | End: 2021-03-31 | Stop reason: SDUPTHER

## 2020-12-29 RX ORDER — TRAZODONE HYDROCHLORIDE 150 MG/1
150 TABLET ORAL NIGHTLY
Qty: 30 TABLET | Refills: 0 | Status: SHIPPED | OUTPATIENT
Start: 2020-12-29 | End: 2021-01-29 | Stop reason: SDUPTHER

## 2020-12-29 RX ORDER — ALPRAZOLAM 1 MG/1
1 TABLET ORAL 2 TIMES DAILY PRN
Qty: 60 TABLET | Refills: 0 | Status: SHIPPED | OUTPATIENT
Start: 2020-12-29 | End: 2021-02-01

## 2021-01-11 ENCOUNTER — HOSPITAL ENCOUNTER (OUTPATIENT)
Age: 46
Setting detail: SPECIMEN
Discharge: HOME | End: 2021-01-11
Payer: COMMERCIAL

## 2021-01-11 ENCOUNTER — OFFICE VISIT (OUTPATIENT)
Dept: PRIMARY CARE CLINIC | Age: 46
End: 2021-01-11
Payer: COMMERCIAL

## 2021-01-11 VITALS
BODY MASS INDEX: 20.34 KG/M2 | SYSTOLIC BLOOD PRESSURE: 112 MMHG | DIASTOLIC BLOOD PRESSURE: 78 MMHG | HEART RATE: 112 BPM | TEMPERATURE: 97 F | WEIGHT: 126 LBS | OXYGEN SATURATION: 97 %

## 2021-01-11 DIAGNOSIS — G44.029 CHRONIC CLUSTER HEADACHE, NOT INTRACTABLE: ICD-10-CM

## 2021-01-11 DIAGNOSIS — F41.9 ANXIETY: ICD-10-CM

## 2021-01-11 DIAGNOSIS — Z79.899 MEDICATION MANAGEMENT: Primary | ICD-10-CM

## 2021-01-11 DIAGNOSIS — F51.01 PRIMARY INSOMNIA: ICD-10-CM

## 2021-01-11 DIAGNOSIS — Z20.822 EXPOSURE TO COVID-19 VIRUS: ICD-10-CM

## 2021-01-11 DIAGNOSIS — J06.9 UPPER RESPIRATORY TRACT INFECTION, UNSPECIFIED TYPE: ICD-10-CM

## 2021-01-11 PROCEDURE — G8420 CALC BMI NORM PARAMETERS: HCPCS | Performed by: NURSE PRACTITIONER

## 2021-01-11 PROCEDURE — G8482 FLU IMMUNIZE ORDER/ADMIN: HCPCS | Performed by: NURSE PRACTITIONER

## 2021-01-11 PROCEDURE — G8427 DOCREV CUR MEDS BY ELIG CLIN: HCPCS | Performed by: NURSE PRACTITIONER

## 2021-01-11 PROCEDURE — 4004F PT TOBACCO SCREEN RCVD TLK: CPT | Performed by: NURSE PRACTITIONER

## 2021-01-11 PROCEDURE — 99214 OFFICE O/P EST MOD 30 MIN: CPT | Performed by: NURSE PRACTITIONER

## 2021-01-11 ASSESSMENT — ENCOUNTER SYMPTOMS
WHEEZING: 0
ABDOMINAL PAIN: 0
EYE REDNESS: 0
SINUS PRESSURE: 1
RHINORRHEA: 0
NAUSEA: 0
DIARRHEA: 0
VOMITING: 0
EYE DISCHARGE: 0
SHORTNESS OF BREATH: 0
SORE THROAT: 1
COUGH: 1

## 2021-01-11 ASSESSMENT — PATIENT HEALTH QUESTIONNAIRE - PHQ9: DEPRESSION UNABLE TO ASSESS: URGENT/EMERGENT SITUATION

## 2021-01-11 NOTE — PROGRESS NOTES
[facility] PRIMARY CARE  [address] [facility] [address]  Dept: Joss Cardenas is a 50 y.o. female Established patient, who presents today for her medical conditions/complaintsas noted below. Chief Complaint   Patient presents with    3 Month Follow-Up     Medication check        HPI:     HPI    Reviewed prior notes None  Reviewed previous Labs    States she has a swollen right gland in her neck. She [place] at the mall waiting for daughter who was working and people did not have a mask on. She may have been exposed to Covid 19 on 1/1 -1/2/2021    Anxiety is off the chart - stress dealing with daughter, have to go to court related to  and Covid 19 concerns. Trazodone allows her to sleep 8 hours per night  She still has headaches daily. Appetite is poor - she is not using megace  She is taking estroven for hot flashes.     No results found for: LDLCHOLESTEROL, LDLCALC    (goal LDL is <100)   AST (U/L)   Date Value   05/30/2018 16     ALT (U/L)   Date Value   05/30/2018 14     BUN (mg/dL)   Date Value   09/09/2020 9     BP Readings from Last 3 Encounters:   01/11/21 112/78   11/10/20 114/68   10/09/20 132/88          (goal 120/80)    Past Medical History:   Diagnosis Date    Endometriosis     Fibroid tumor 2017    Ovarian cyst     Wears dentures     uppers      Past Surgical History:   Procedure Laterality Date    BREAST SURGERY  2004    lumpectomy of the L     HYSTERECTOMY      cervix was taken    LAPAROSCOPIC APPENDECTOMY N/A 9/24/2020    APPENDECTOMY, LAPAROSCOPIC performed by Eagle Barlow MD at [facility] N/A 9/24/2020    OPERATIVE LAPAROSCOPY WITH RIGHT ADNEXECTOMY performed by Giovana Najera DO at [address], ureteral Bilateral 9/24/2020    URETERAL CATHETER INSERTION performed by Radni Hirsch MD at [address] [facility] [address] [facility]         Family History   Problem Relation Age of Onset  Colon Cancer Paternal Grandfather        Social History     Tobacco Use    Smoking status: Current Every Day Smoker     Packs/day: 0.25     Years: 8.00     Pack years: 2.00     Types: Cigarettes    Smokeless tobacco: Never Used   Substance Use Topics    Alcohol use: No     Comment: social      Current Outpatient Medications   Medication Sig Dispense Refill    traZODone (DESYREL) 150 MG tablet Take 1 tablet by mouth nightly 30 tablet 0    ALPRAZolam (XANAX) 1 MG tablet Take 1 tablet by mouth 2 times daily as needed for Sleep or Anxiety for up to 30 days. 60 tablet 0    butalbital-acetaminophen-caffeine (FIORICET, ESGIC) -40 MG per tablet Take 1 tablet by mouth every 6 hours as needed for Headaches 60 tablet 0    triamcinolone (KENALOG) 0.1 % ointment Apply topically 2 times daily 1 Tube 3    Black Cohosh-Soy-Ginkgo-Magnol (ESTROVEN MOOD & MEMORY) TABS Take 1 tablet by mouth daily 30 tablet 6    fluticasone (FLONASE) 50 MCG/ACT nasal spray 2 sprays by Each Nostril route daily 1 Bottle 0    megestrol (MEGACE) 40 MG/ML suspension Take 10 mLs by mouth daily      diclofenac (VOLTAREN) 50 MG EC tablet Take 50 mg by mouth nightly as needed      B Complex-C (SUPER B COMPLEX PO) Take by mouth       No current facility-administered medications for this visit. Allergies   Allergen Reactions    Diphenhydramine Itching    Sleep Aid [Diphenhydramine Hcl (Sleep)]        Health Maintenance   Topic Date Due    Hepatitis C screen  1975    HIV screen  03/27/1988    Lipid screen  03/27/2013    DTaP/Tdap/Td vaccine (2 - Td) 10/09/2030    Flu vaccine  Completed    Pneumococcal 0-64 years Vaccine  Completed    Hepatitis A vaccine  Aged Out    Hepatitis B vaccine  Aged Out    Hib vaccine  Aged Out    Meningococcal (ACWY) vaccine  Aged Out       Subjective:      Review of Systems   Constitutional: Negative for chills and fatigue. HENT: Positive for sinus pressure and sore throat. Negative for rhinorrhea. Eyes: Negative for discharge and redness. Respiratory: Positive for cough. Negative for shortness of breath and wheezing. Cardiovascular: Negative for chest pain and palpitations. Gastrointestinal: Negative for abdominal pain, diarrhea, nausea and vomiting. Genitourinary: Negative for dysuria and frequency. Musculoskeletal: Positive for arthralgias. Negative for myalgias. Skin: Negative for rash and wound. Neurological: Negative for dizziness, light-headedness and headaches. Psychiatric/Behavioral: Negative for sleep disturbance (improved on medication). The patient is nervous/anxious. Objective:     /78   Pulse 112   Temp 97 °F (36.1 °C)   Wt 126 lb (57.2 kg)   LMP 02/26/2018   SpO2 97%   BMI 20.34 kg/m²   Physical Exam  Vitals signs and nursing note reviewed. Constitutional:       Appearance: Normal appearance. HENT:      Head: Normocephalic and atraumatic. Right Ear: Tympanic membrane, ear canal and external ear normal.      Left Ear: Tympanic membrane, ear canal and external ear normal.   Eyes:      Extraocular Movements: Extraocular movements intact. Pupils: Pupils are equal, round, and reactive to light. Neck:      Musculoskeletal: Normal range of motion and neck supple. No spinous process tenderness. Thyroid: No thyromegaly. Cardiovascular:      Rate and Rhythm: Normal rate and regular rhythm. Heart sounds: Normal heart sounds. Pulmonary:      Breath sounds: Normal breath sounds. Abdominal:      General: Bowel sounds are normal.      Palpations: Abdomen is soft. Musculoskeletal:      Right lower leg: No edema. Left lower leg: No edema. Lymphadenopathy:      Head:      Right side of head: Submental adenopathy present. No submandibular or tonsillar adenopathy. Left side of head: No submental, submandibular or tonsillar adenopathy. Cervical: Cervical adenopathy present. Right cervical: Superficial cervical adenopathy present. Left cervical: No superficial cervical adenopathy. Skin:     General: Skin is warm and dry. Capillary Refill: Capillary refill takes less than 2 seconds. Neurological:      Mental Status: She is alert and oriented to person, place, and time. Cranial Nerves: No cranial nerve deficit. Psychiatric:         Mood and Affect: Mood normal.         Thought Content: Thought content normal.         Judgment: Judgment normal.       Controlled substances monitoring: possible medication side effects, risk of tolerance and/or dependence, and alternative treatments discussed, no signs of potential drug abuse or diversion identified and OARRS report reviewed today- activity consistent with treatment plan. Assessment:       Diagnosis Orders   1. Medication management     2. Exposure to COVID-19 virus  COVID-19 Ambulatory   3. Primary insomnia     4. Anxiety     5. Chronic cluster headache, not intractable     6. Upper respiratory tract infection, unspecified type          Plan:    Continue current medications  Encouraged to quit smoking  Increase fluids and rest until respiratory infection resolves  If sore throat and swollen glands do not improve by end of the week may call for treatment. Return in about 3 months (around 4/11/2021) for anxiety. Orders Placed This Encounter   Procedures    COVID-19 Ambulatory     Standing Status:   Future     Standing Expiration Date:   5/11/2021     Scheduling Instructions:      Saline media preferred given current shortage of viral transport media but both acceptable     Order Specific Question:   Is this test for diagnosis or screening? Answer:   Screening     Order Specific Question:   Symptomatic for COVID-19 as defined by CDC?      Answer:   No     Order Specific Question:   Date of Symptom Onset     Answer:   N/A Order Specific Question:   Hospitalized for COVID-19? Answer:   No     Order Specific Question:   Admitted to ICU for COVID-19? Answer:   No     Order Specific Question:   Employed in healthcare setting? Answer:   No     Order Specific Question:   Resident in a congregate (group) care setting? Answer:   No     Order Specific Question:   Pregnant? Answer:   No     Order Specific Question:   Previously tested for COVID-19? Answer:   Yes     No orders of the defined types were placed in this encounter. Patient given educationalmaterials - see patient instructions. Discussed use, benefit, and side effectsof prescribed medications. All patient questions answered. Pt voiced understanding. Reviewed health maintenance. Instructed to continue current medications, diet andexercise. Patient agreed with treatment plan. Follow up as directed.      Electronicallysigned by ANITA Flor CNP on 1/11/2021 at 10:05 AM

## 2021-01-11 NOTE — PATIENT INSTRUCTIONS
Continue current medications  Encouraged to quit smoking  Increase fluids and rest until respiratory infection resolves  If sore throat and swollen glands do not improve by end of the week may call for treatment.

## 2021-01-12 LAB
SARS-COV-2, RAPID: NORMAL
SARS-COV-2: NORMAL
SARS-COV-2: NOT DETECTED
SOURCE: NORMAL

## 2021-01-20 ENCOUNTER — TELEPHONE (OUTPATIENT)
Dept: PRIMARY CARE CLINIC | Age: 46
End: 2021-01-20

## 2021-01-20 RX ORDER — AMOXICILLIN AND CLAVULANATE POTASSIUM 875; 125 MG/1; MG/1
1 TABLET, FILM COATED ORAL 2 TIMES DAILY
Qty: 20 TABLET | Refills: 0 | Status: SHIPPED | OUTPATIENT
Start: 2021-01-20 | End: 2021-01-30

## 2021-01-20 NOTE — TELEPHONE ENCOUNTER
Patient is calling and states she was seen here last week and still has a sore throat, coughing up green phlegm, headache and some sinus pressure. Pt tested negative for COVID on 1/11/2021. Pt wants to know if something can be sent in. [facility].

## 2021-01-29 DIAGNOSIS — F51.01 PRIMARY INSOMNIA: ICD-10-CM

## 2021-01-29 RX ORDER — GUAIFENESIN 600 MG/1
600 TABLET, EXTENDED RELEASE ORAL 2 TIMES DAILY
Qty: 30 TABLET | Refills: 0 | Status: SHIPPED | OUTPATIENT
Start: 2021-01-29 | End: 2021-02-13

## 2021-01-29 RX ORDER — TRAZODONE HYDROCHLORIDE 150 MG/1
150 TABLET ORAL NIGHTLY
Qty: 30 TABLET | Refills: 0 | Status: SHIPPED | OUTPATIENT
Start: 2021-01-29 | End: 2021-03-01 | Stop reason: SDUPTHER

## 2021-01-30 DIAGNOSIS — F41.9 ANXIETY: ICD-10-CM

## 2021-01-30 DIAGNOSIS — Z79.899 MEDICATION MANAGEMENT: ICD-10-CM

## 2021-02-01 RX ORDER — ALPRAZOLAM 1 MG/1
TABLET ORAL
Qty: 60 TABLET | Refills: 0 | Status: SHIPPED | OUTPATIENT
Start: 2021-02-01 | End: 2021-03-01 | Stop reason: SDUPTHER

## 2021-03-01 DIAGNOSIS — F41.9 ANXIETY: ICD-10-CM

## 2021-03-01 DIAGNOSIS — Z79.899 MEDICATION MANAGEMENT: ICD-10-CM

## 2021-03-01 DIAGNOSIS — F51.01 PRIMARY INSOMNIA: ICD-10-CM

## 2021-03-02 DIAGNOSIS — F41.9 ANXIETY: ICD-10-CM

## 2021-03-02 DIAGNOSIS — Z79.899 MEDICATION MANAGEMENT: ICD-10-CM

## 2021-03-02 DIAGNOSIS — F51.01 PRIMARY INSOMNIA: ICD-10-CM

## 2021-03-02 RX ORDER — ALPRAZOLAM 1 MG/1
TABLET ORAL
Qty: 60 TABLET | Refills: 0 | OUTPATIENT
Start: 2021-03-02 | End: 2021-04-01

## 2021-03-02 RX ORDER — TRAZODONE HYDROCHLORIDE 150 MG/1
150 TABLET ORAL NIGHTLY
Qty: 30 TABLET | Refills: 0 | OUTPATIENT
Start: 2021-03-02

## 2021-03-02 RX ORDER — ALPRAZOLAM 1 MG/1
1 TABLET ORAL 2 TIMES DAILY PRN
Qty: 60 TABLET | Refills: 0 | Status: SHIPPED | OUTPATIENT
Start: 2021-03-02 | End: 2021-03-31 | Stop reason: SDUPTHER

## 2021-03-02 RX ORDER — TRAZODONE HYDROCHLORIDE 150 MG/1
150 TABLET ORAL NIGHTLY
Qty: 30 TABLET | Refills: 0 | Status: SHIPPED | OUTPATIENT
Start: 2021-03-02 | End: 2021-03-31 | Stop reason: SDUPTHER

## 2021-03-31 DIAGNOSIS — F51.01 PRIMARY INSOMNIA: ICD-10-CM

## 2021-03-31 DIAGNOSIS — F41.9 ANXIETY: ICD-10-CM

## 2021-03-31 DIAGNOSIS — G44.029 CHRONIC CLUSTER HEADACHE, NOT INTRACTABLE: ICD-10-CM

## 2021-03-31 DIAGNOSIS — Z79.899 MEDICATION MANAGEMENT: ICD-10-CM

## 2021-04-01 RX ORDER — BUTALBITAL, ACETAMINOPHEN AND CAFFEINE 50; 325; 40 MG/1; MG/1; MG/1
1 TABLET ORAL EVERY 6 HOURS PRN
Qty: 60 TABLET | Refills: 0 | Status: SHIPPED | OUTPATIENT
Start: 2021-04-01 | End: 2022-02-09 | Stop reason: SDUPTHER

## 2021-04-01 RX ORDER — ALPRAZOLAM 1 MG/1
1 TABLET ORAL 2 TIMES DAILY PRN
Qty: 60 TABLET | Refills: 0 | Status: SHIPPED | OUTPATIENT
Start: 2021-04-01 | End: 2021-04-29 | Stop reason: SDUPTHER

## 2021-04-01 RX ORDER — TRAZODONE HYDROCHLORIDE 150 MG/1
150 TABLET ORAL NIGHTLY
Qty: 30 TABLET | Refills: 0 | Status: SHIPPED | OUTPATIENT
Start: 2021-04-01 | End: 2021-04-29 | Stop reason: SDUPTHER

## 2021-04-12 ENCOUNTER — HOSPITAL ENCOUNTER (OUTPATIENT)
Age: 46
Setting detail: SPECIMEN
Discharge: HOME | End: 2021-04-12
Payer: COMMERCIAL

## 2021-04-12 ENCOUNTER — OFFICE VISIT (OUTPATIENT)
Dept: PRIMARY CARE CLINIC | Age: 46
End: 2021-04-12
Payer: COMMERCIAL

## 2021-04-12 VITALS
DIASTOLIC BLOOD PRESSURE: 72 MMHG | TEMPERATURE: 96.7 F | BODY MASS INDEX: 21.06 KG/M2 | WEIGHT: 130.5 LBS | SYSTOLIC BLOOD PRESSURE: 126 MMHG | HEART RATE: 86 BPM | OXYGEN SATURATION: 98 %

## 2021-04-12 DIAGNOSIS — Z79.899 MEDICATION MANAGEMENT: Primary | ICD-10-CM

## 2021-04-12 DIAGNOSIS — N39.0 URINARY TRACT INFECTION WITHOUT HEMATURIA, SITE UNSPECIFIED: ICD-10-CM

## 2021-04-12 DIAGNOSIS — R39.15 URINARY URGENCY: ICD-10-CM

## 2021-04-12 DIAGNOSIS — M54.50 CHRONIC MIDLINE LOW BACK PAIN WITHOUT SCIATICA: ICD-10-CM

## 2021-04-12 DIAGNOSIS — Z79.899 HIGH RISK MEDICATION USE: ICD-10-CM

## 2021-04-12 DIAGNOSIS — G89.29 CHRONIC MIDLINE LOW BACK PAIN WITHOUT SCIATICA: ICD-10-CM

## 2021-04-12 DIAGNOSIS — F41.9 ANXIETY: ICD-10-CM

## 2021-04-12 LAB
ALCOHOL URINE: NEGATIVE
AMPHETAMINE SCREEN, URINE: NEGATIVE
BARBITURATE SCREEN, URINE: POSITIVE
BENZODIAZEPINE SCREEN, URINE: POSITIVE
BILIRUBIN, POC: NEGATIVE
BLOOD URINE, POC: NEGATIVE
BUPRENORPHINE URINE: NEGATIVE
CLARITY, POC: ABNORMAL
COCAINE METABOLITE SCREEN URINE: NEGATIVE
COLOR, POC: ABNORMAL
FENTANYL SCREEN, URINE: NEGATIVE
GABAPENTIN SCREEN, URINE: NEGATIVE
GLUCOSE URINE, POC: NEGATIVE
KETONES, POC: NEGATIVE
LEUKOCYTE EST, POC: NEGATIVE
MDMA URINE: NEGATIVE
METHADONE SCREEN, URINE: NEGATIVE
METHAMPHETAMINE, URINE: NEGATIVE
NITRITE, POC: NEGATIVE
OPIATE SCREEN URINE: NEGATIVE
OXYCODONE SCREEN URINE: NEGATIVE
PH, POC: 5
PHENCYCLIDINE SCREEN URINE: NEGATIVE
PROPOXYPHENE SCREEN, URINE: NEGATIVE
PROTEIN, POC: ABNORMAL
SPECIFIC GRAVITY, POC: >=1.03
SYNTHETIC CANNABINOIDS(K2) SCREEN, URINE: NEGATIVE
THC SCREEN, URINE: NEGATIVE
TRAMADOL SCREEN URINE: NEGATIVE
TRICYCLIC ANTIDEPRESSANTS, UR: NEGATIVE
UROBILINOGEN, POC: 0.2

## 2021-04-12 PROCEDURE — G8420 CALC BMI NORM PARAMETERS: HCPCS | Performed by: NURSE PRACTITIONER

## 2021-04-12 PROCEDURE — 4004F PT TOBACCO SCREEN RCVD TLK: CPT | Performed by: NURSE PRACTITIONER

## 2021-04-12 PROCEDURE — 80305 DRUG TEST PRSMV DIR OPT OBS: CPT | Performed by: NURSE PRACTITIONER

## 2021-04-12 PROCEDURE — 81003 URINALYSIS AUTO W/O SCOPE: CPT | Performed by: NURSE PRACTITIONER

## 2021-04-12 PROCEDURE — 99213 OFFICE O/P EST LOW 20 MIN: CPT | Performed by: NURSE PRACTITIONER

## 2021-04-12 PROCEDURE — G8427 DOCREV CUR MEDS BY ELIG CLIN: HCPCS | Performed by: NURSE PRACTITIONER

## 2021-04-12 RX ORDER — IBUPROFEN 800 MG/1
800 TABLET ORAL
Qty: 90 TABLET | Refills: 1 | Status: SHIPPED | OUTPATIENT
Start: 2021-04-12

## 2021-04-12 RX ORDER — SULFAMETHOXAZOLE AND TRIMETHOPRIM 800; 160 MG/1; MG/1
1 TABLET ORAL 2 TIMES DAILY
Qty: 20 TABLET | Refills: 0 | Status: SHIPPED | OUTPATIENT
Start: 2021-04-12 | End: 2021-04-22

## 2021-04-12 ASSESSMENT — ENCOUNTER SYMPTOMS
SHORTNESS OF BREATH: 0
CONSTIPATION: 1
COUGH: 0
BACK PAIN: 1
DIARRHEA: 0
SINUS PAIN: 0

## 2021-04-12 ASSESSMENT — PATIENT HEALTH QUESTIONNAIRE - PHQ9
SUM OF ALL RESPONSES TO PHQ QUESTIONS 1-9: 0
SUM OF ALL RESPONSES TO PHQ QUESTIONS 1-9: 0
SUM OF ALL RESPONSES TO PHQ9 QUESTIONS 1 & 2: 0

## 2021-04-12 NOTE — PROGRESS NOTES
[address] [address] PRIMARY CARE  [facility] [address]  [facility] [address] [place] [address]  Dept: [phone]    Chester Wharton is a 48 y.o. female Established patient, who presents today for her medical conditions/complaints as noted below. Chief Complaint   Patient presents with    Anxiety       HPI:     HPI    Reviewed prior notes None  Reviewed previous      She went to [place] with daughter and that caused headaches and increased anxiety. She still stressed out \"normal\" - she tries to keep her cool  She takes 1/2 tablet and then another 1/2 tablet later and full tablet at night. It helps keeps her mellow. She would rather take xanax than be on an antidepressant. She takes trazodone about 6 pm and likes to be in bed by 8:30 - She sleeps about 9 hours. She does wake up during the night but generally can go back to sleep. She thinks she get more hungry after she takes xanax. No street drugs. She is smoking about 15 cigarettes per day.      No results found for: LDLCHOLESTEROL, LDLCALC    (goal LDL is <100)   AST (U/L)   Date Value   05/30/2018 16     ALT (U/L)   Date Value   05/30/2018 14     BUN (mg/dL)   Date Value   09/09/2020 9     BP Readings from Last 3 Encounters:   04/12/21 126/72   01/11/21 112/78   11/10/20 114/68          (goal 120/80)    Past Medical History:   Diagnosis Date    Endometriosis     Fibroid tumor 2017    Ovarian cyst     Wears dentures     uppers      Past Surgical History:   Procedure Laterality Date    BREAST SURGERY  2004    lumpectomy of the L     HYSTERECTOMY      cervix was taken    LAPAROSCOPIC APPENDECTOMY N/A 9/24/2020    APPENDECTOMY, LAPAROSCOPIC performed by Jacquelin Arteaga MD at [address] 9/24/2020    OPERATIVE LAPAROSCOPY WITH RIGHT ADNEXECTOMY performed by Kenneth Kulkarni DO at [address], ureteral Bilateral 9/24/2020    URETERAL CATHETER INSERTION performed by Janie Frances MD at [place] [address] TOOTH EXTRACTION         Family History   Problem Relation Age of Onset    Colon Cancer Paternal Grandfather        Social History     Tobacco Use    Smoking status: Current Every Day Smoker     Packs/day: 0.25     Years: 8.00     Pack years: 2.00     Types: Cigarettes    Smokeless tobacco: Never Used   Substance Use Topics    Alcohol use: No     Comment: social      Current Outpatient Medications   Medication Sig Dispense Refill    sulfamethoxazole-trimethoprim (BACTRIM DS;SEPTRA DS) 800-160 MG per tablet Take 1 tablet by mouth 2 times daily for 10 days 20 tablet 0    ibuprofen (ADVIL;MOTRIN) 800 MG tablet Take 1 tablet by mouth 3 times daily (with meals) 90 tablet 1    butalbital-acetaminophen-caffeine (FIORICET, ESGIC) -40 MG per tablet Take 1 tablet by mouth every 6 hours as needed for Headaches 60 tablet 0    traZODone (DESYREL) 150 MG tablet Take 1 tablet by mouth nightly 30 tablet 0    ALPRAZolam (XANAX) 1 MG tablet Take 1 tablet by mouth 2 times daily as needed for Sleep or Anxiety for up to 30 days. 60 tablet 0    triamcinolone (KENALOG) 0.1 % ointment Apply topically 2 times daily 1 Tube 3    Black Cohosh-Soy-Ginkgo-Magnol (ESTROVEN MOOD & MEMORY) TABS Take 1 tablet by mouth daily 30 tablet 6    fluticasone (FLONASE) 50 MCG/ACT nasal spray 2 sprays by Each Nostril route daily 1 Bottle 0    diclofenac (VOLTAREN) 50 MG EC tablet Take 50 mg by mouth nightly as needed      B Complex-C (SUPER B COMPLEX PO) Take by mouth      megestrol (MEGACE) 40 MG/ML suspension Take 10 mLs by mouth daily       No current facility-administered medications for this visit.       Allergies   Allergen Reactions    Diphenhydramine Itching    Sleep Aid [Diphenhydramine Hcl (Sleep)]        Health Maintenance   Topic Date Due    Hepatitis C screen  Never done    HIV screen  Never done    Lipid screen  Never done    COVID-19 Vaccine (2 - Pfizer 2-dose series) 04/14/2021    DTaP/Tdap/Td vaccine (2 - Td) middle finger. Normal ROM today   Skin:     General: Skin is warm. Findings: No rash. Neurological:      Mental Status: She is alert and oriented to person, place, and time. Psychiatric:         Attention and Perception: Attention normal.         Mood and Affect: Mood is anxious. Speech: Speech normal.         Behavior: Behavior normal.         Thought Content: Thought content normal.       Controlled substances monitoring: possible medication side effects, risk of tolerance and/or dependence, and alternative treatments discussed, no signs of potential drug abuse or diversion identified, OARRS report reviewed today- activity consistent with treatment plan and random urine drug screen sent today. Assessment:       Diagnosis Orders   1. Medication management  POCT Rapid Drug Screen   2. High risk medication use     3. Urinary urgency  POCT Urinalysis No Micro (Auto)    Culture, Urine   4. Urinary tract infection without hematuria, site unspecified  sulfamethoxazole-trimethoprim (BACTRIM DS;SEPTRA DS) 800-160 MG per tablet   5. Anxiety     6. Chronic midline low back pain without sciatica  ibuprofen (ADVIL;MOTRIN) 800 MG tablet        Plan:    Bactrim DS 2x/day for 7 days for UTI  Ibuprofen 800 mg for back and right hand pain  Continue Xaxax as needed  Encouraged to stop smoking    Return in about 3 months (around 7/12/2021) for anxiety. Orders Placed This Encounter   Procedures    Culture, Urine     Standing Status:   Future     Standing Expiration Date:   4/12/2022     Order Specific Question:   Specify (ex-cath, midstream, cysto, etc)?      Answer:   clean catch    POCT Rapid Drug Screen    POCT Urinalysis No Micro (Auto)     Orders Placed This Encounter   Medications    sulfamethoxazole-trimethoprim (BACTRIM DS;SEPTRA DS) 800-160 MG per tablet     Sig: Take 1 tablet by mouth 2 times daily for 10 days     Dispense:  20 tablet     Refill:  0    ibuprofen (ADVIL;MOTRIN) 800 MG tablet     Sig: Take 1 tablet by mouth 3 times daily (with meals)     Dispense:  90 tablet     Refill:  1       Patient given educational materials - see patient instructions. Discussed use, benefit, and side effects of prescribed medications. All patient questions answered. Pt voiced understanding. Reviewed health maintenance. Instructed to continue current medications, diet and exercise. Patient agreed with treatment plan. Follow up as directed.      Electronically signed by ANITA Cruz CNP on 4/12/2021 at 8:39 AM

## 2021-04-14 LAB
CULTURE: NORMAL
Lab: NORMAL
SPECIMEN DESCRIPTION: NORMAL

## 2021-04-20 ENCOUNTER — HOSPITAL ENCOUNTER (EMERGENCY)
Age: 46
Discharge: LEFT BEFORE DISCHARGE | End: 2021-04-20
Attending: EMERGENCY MEDICINE
Payer: COMMERCIAL

## 2021-04-20 ENCOUNTER — APPOINTMENT (OUTPATIENT)
Dept: GENERAL RADIOLOGY | Age: 46
End: 2021-04-20
Payer: COMMERCIAL

## 2021-04-20 ENCOUNTER — TELEPHONE (OUTPATIENT)
Dept: PRIMARY CARE CLINIC | Age: 46
End: 2021-04-20

## 2021-04-20 VITALS
WEIGHT: 130 LBS | DIASTOLIC BLOOD PRESSURE: 58 MMHG | OXYGEN SATURATION: 98 % | SYSTOLIC BLOOD PRESSURE: 122 MMHG | HEART RATE: 84 BPM | BODY MASS INDEX: 20.89 KG/M2 | HEIGHT: 66 IN | RESPIRATION RATE: 16 BRPM | TEMPERATURE: 98 F

## 2021-04-20 DIAGNOSIS — R07.9 ACUTE CHEST PAIN: ICD-10-CM

## 2021-04-20 DIAGNOSIS — Z53.21 ELOPED FROM EMERGENCY DEPARTMENT: Primary | ICD-10-CM

## 2021-04-20 LAB
ABSOLUTE EOS #: 0.2 K/UL (ref 0–0.4)
ABSOLUTE IMMATURE GRANULOCYTE: ABNORMAL K/UL (ref 0–0.3)
ABSOLUTE LYMPH #: 1.7 K/UL (ref 1–4.8)
ABSOLUTE MONO #: 0.4 K/UL (ref 0.1–1.3)
ALBUMIN SERPL-MCNC: 4.6 G/DL (ref 3.5–5.2)
ALBUMIN/GLOBULIN RATIO: ABNORMAL (ref 1–2.5)
ALP BLD-CCNC: 145 U/L (ref 35–104)
ALT SERPL-CCNC: 22 U/L (ref 5–33)
ANION GAP SERPL CALCULATED.3IONS-SCNC: 12 MMOL/L (ref 9–17)
AST SERPL-CCNC: 16 U/L
BASOPHILS # BLD: 1 % (ref 0–2)
BASOPHILS ABSOLUTE: 0 K/UL (ref 0–0.2)
BILIRUB SERPL-MCNC: 0.16 MG/DL (ref 0.3–1.2)
BUN BLDV-MCNC: 18 MG/DL (ref 6–20)
BUN/CREAT BLD: ABNORMAL (ref 9–20)
CALCIUM SERPL-MCNC: 9.7 MG/DL (ref 8.6–10.4)
CHLORIDE BLD-SCNC: 102 MMOL/L (ref 98–107)
CO2: 25 MMOL/L (ref 20–31)
CREAT SERPL-MCNC: 0.63 MG/DL (ref 0.5–0.9)
DIFFERENTIAL TYPE: ABNORMAL
EOSINOPHILS RELATIVE PERCENT: 3 % (ref 0–4)
GFR AFRICAN AMERICAN: >60 ML/MIN
GFR NON-AFRICAN AMERICAN: >60 ML/MIN
GFR SERPL CREATININE-BSD FRML MDRD: ABNORMAL ML/MIN/{1.73_M2}
GFR SERPL CREATININE-BSD FRML MDRD: ABNORMAL ML/MIN/{1.73_M2}
GLUCOSE BLD-MCNC: 107 MG/DL (ref 70–99)
HCT VFR BLD CALC: 37.2 % (ref 36–46)
HEMOGLOBIN: 12.5 G/DL (ref 12–16)
IMMATURE GRANULOCYTES: ABNORMAL %
LYMPHOCYTES # BLD: 35 % (ref 24–44)
MCH RBC QN AUTO: 30.7 PG (ref 26–34)
MCHC RBC AUTO-ENTMCNC: 33.5 G/DL (ref 31–37)
MCV RBC AUTO: 91.5 FL (ref 80–100)
MONOCYTES # BLD: 9 % (ref 1–7)
NRBC AUTOMATED: ABNORMAL PER 100 WBC
PDW BLD-RTO: 12.7 % (ref 11.5–14.9)
PLATELET # BLD: 285 K/UL (ref 150–450)
PLATELET ESTIMATE: ABNORMAL
PMV BLD AUTO: 7.7 FL (ref 6–12)
POTASSIUM SERPL-SCNC: 3.9 MMOL/L (ref 3.7–5.3)
RBC # BLD: 4.07 M/UL (ref 4–5.2)
RBC # BLD: ABNORMAL 10*6/UL
SEG NEUTROPHILS: 52 % (ref 36–66)
SEGMENTED NEUTROPHILS ABSOLUTE COUNT: 2.5 K/UL (ref 1.3–9.1)
SODIUM BLD-SCNC: 139 MMOL/L (ref 135–144)
TOTAL PROTEIN: 7.3 G/DL (ref 6.4–8.3)
TROPONIN INTERP: NORMAL
TROPONIN T: NORMAL NG/ML
TROPONIN, HIGH SENSITIVITY: <6 NG/L (ref 0–14)
WBC # BLD: 4.8 K/UL (ref 3.5–11)
WBC # BLD: ABNORMAL 10*3/UL

## 2021-04-20 PROCEDURE — 85025 COMPLETE CBC W/AUTO DIFF WBC: CPT

## 2021-04-20 PROCEDURE — 93005 ELECTROCARDIOGRAM TRACING: CPT | Performed by: STUDENT IN AN ORGANIZED HEALTH CARE EDUCATION/TRAINING PROGRAM

## 2021-04-20 PROCEDURE — 99284 EMERGENCY DEPT VISIT MOD MDM: CPT

## 2021-04-20 PROCEDURE — 80053 COMPREHEN METABOLIC PANEL: CPT

## 2021-04-20 PROCEDURE — 36415 COLL VENOUS BLD VENIPUNCTURE: CPT

## 2021-04-20 PROCEDURE — 99285 EMERGENCY DEPT VISIT HI MDM: CPT

## 2021-04-20 PROCEDURE — 84484 ASSAY OF TROPONIN QUANT: CPT

## 2021-04-20 PROCEDURE — 71045 X-RAY EXAM CHEST 1 VIEW: CPT

## 2021-04-20 ASSESSMENT — HEART SCORE: ECG: 0

## 2021-04-20 NOTE — ED NOTES
Pt acting paranoid. Pt reports to this RN that she wants everyone to know that she does NOT do drugs or miss use her medications. Pt states she takes a half xanax in the morning and a half in the afternoon/evening and does not use any other drugs or alcohol.       Keven Burgos RN  04/20/21 [id number]

## 2021-04-20 NOTE — TELEPHONE ENCOUNTER
The chest pain and sweaty and dizziness indicate it may be cardiac related. I recommend she be seen today. ER would be good because they can do serial troponin levels. However if she does not want to go to ER she may come to the office. However, I know before Covid we were having trouble with our EKG machine here at [facility] so I would recommend she go to [place] until we can check the EKG machine out.

## 2021-04-20 NOTE — ED PROVIDER NOTES
[address] ED  Emergency Department Encounter  EmergencyMedicine Resident     Pt Compa Mendez  MRN: [id number]  [place] 1975  Date of evaluation: 4/20/21  PCP:  Romayne La, APRN - CNP    CHIEF COMPLAINT       Chief Complaint   Patient presents with    Chest Pain    Dizziness     Lightheadedness and dizziness    Headache    Sweats       HISTORY OF PRESENT ILLNESS  (Location/Symptom, Timing/Onset, Context/Setting, Quality, Duration, Modifying Factors, Severity.)      Chester Wharton is a 48 y.o. female who presents with Chest pain and dizziness since this morning  States she woke up feeling dizzy with room spinning and this then turned into chest pain that is sharp mid sternal   Then began having diaphoresis   Denies SOB, palpations, leg swelling  No projection of pain in to shoulder, back arm or neck   Last night she took trazodone, xanax, melatonin for sleep and a cold and headache medication for headache  This morning she took xanax and more headache medication   Has history of anxiety and reports being with daughter yesterday and this made her anxiety increase   Daily smoker  Has chronic cough that is nonproductive, no change   Denies fever,chills, N/V     PAST MEDICAL / SURGICAL / SOCIAL / FAMILY HISTORY      has a past medical history of Anxiety, Endometriosis, Fibroid tumor, Ovarian cyst, and Wears dentures. has a past surgical history that includes Breast surgery (2004); Hysterectomy; [place] tooth extraction; laparoscopy (N/A, 9/24/2020); pr catheter, ureteral (Bilateral, 9/24/2020); and laparoscopic appendectomy (N/A, 9/24/2020).     Social History     Socioeconomic History    Marital status: Unknown     Spouse name: Not on file    Number of children: Not on file    Years of education: Not on file    Highest education level: Not on file   Occupational History    Not on file   Social Needs    Financial resource strain: Not on file    Food insecurity     Worry: Not on file     Inability: Not on file    Transportation needs     Medical: Not on file     Non-medical: Not on file   Tobacco Use    Smoking status: Current Every Day Smoker     Packs/day: 0.75     Years: 20.00     Pack years: 15.00     Types: Cigarettes    Smokeless tobacco: Never Used   Substance and Sexual Activity    Alcohol use: No     Comment: social    Drug use: Not Currently     Frequency: 7.0 times per week     Types: Marijuana    Sexual activity: Not on file   Lifestyle    Physical activity     Days per week: Not on file     Minutes per session: Not on file    Stress: Not on file   Relationships    Social connections     Talks on phone: Not on file     Gets together: Not on file     Attends Muslim service: Not on file     Active member of club or organization: Not on file     Attends meetings of clubs or organizations: Not on file     Relationship status: Not on file    Intimate partner violence     Fear of current or ex partner: Not on file     Emotionally abused: Not on file     Physically abused: Not on file     Forced sexual activity: Not on file   Other Topics Concern    Not on file   Social History Narrative    ** Merged History Encounter **            Family History   Problem Relation Age of Onset    Colon Cancer Paternal Grandfather        Allergies:  Diphenhydramine and Sleep aid [diphenhydramine hcl (sleep)]    Home Medications:  Prior to Admission medications    Medication Sig Start Date End Date Taking?  Authorizing Provider   sulfamethoxazole-trimethoprim (BACTRIM DS;SEPTRA DS) 800-160 MG per tablet Take 1 tablet by mouth 2 times daily for 10 days 4/12/21 4/22/21  ANITA Pollack CNP   ibuprofen (ADVIL;MOTRIN) 800 MG tablet Take 1 tablet by mouth 3 times daily (with meals) 4/12/21   ANITA Pollack CNP   butalbital-acetaminophen-caffeine (FIORICET, ESGIC) -95 MG per tablet Take 1 tablet by mouth every 6 hours as needed for Headaches 4/1/21   ANITA Pollack CNP   traZODone (DESYREL) 150 MG tablet Take 1 tablet by mouth nightly 4/1/21   ANITA Medley CNP   ALPRAZolam Kiya Balo) 1 MG tablet Take 1 tablet by mouth 2 times daily as needed for Sleep or Anxiety for up to 30 days. 4/1/21 5/1/21  ANITA Medley CNP   triamcinolone (KENALOG) 0.1 % ointment Apply topically 2 times daily 12/24/20   ANITA Medley CNP   Black Cohosh-Soy-Ginkgo-Magnol (ESTROVEN MOOD & MEMORY) TABS Take 1 tablet by mouth daily 12/23/20   ANITA Olson NP   fluticasone [facility]) 50 MCG/ACT nasal spray 2 sprays by Each Nostril route daily 10/1/20   ANITA Medley CNP   diclofenac (VOLTAREN) 50 MG EC tablet Take 50 mg by mouth nightly as needed 7/31/20   Historical Provider, MD   B Complex-C (SUPER B COMPLEX PO) Take by mouth    Historical Provider, MD   megestrol (MEGACE) 40 MG/ML suspension Take 10 mLs by mouth daily 3/6/20   Historical Provider, MD       REVIEW OF SYSTEMS    (2-9 systems for level 4, 10 or more for level 5)      General ROS - No fevers, No chills, no gradual weight loss, no night sweats  Ophthalmic ROS - No discharge, No changes in vision  ENT ROS -  No sore throat, No rhinorrhea,   Respiratory ROS - no shortness of breath, no cough, no  wheezing  Cardiovascular ROS - Chest pain+.  no dyspnea on exertion  Gastrointestinal ROS - No abdominal pain, no nausea, no vomiting, no change in bowel habits, no black or bloody stools  Genito-Urinary ROS - No dysuria, trouble voiding, or hematuria  Musculoskeletal ROS - No myalgias, No arthalgias  Neurological ROS - No headache, no dizziness/lightheadedness, No focal weakness, no loss of sensation  Dermatological ROS - No lesions, No rash   Anxious+     PHYSICAL EXAM   (up to 7 for level 4, 8 or more for level 5)      INITIAL VITALS:   BP (!) 122/58   Pulse 84   Temp 98 °F (36.7 °C) (Oral)   Resp 16   Ht 5' 6\" (1.676 m)   Wt 130 lb (59 kg)   LMP 02/26/2018   SpO2 98%   BMI 20.98 kg/m²     General Basophils Absolute 0.00 0.0 - 0.2 k/uL    Absolute Immature Granulocyte NOT REPORTED 0.00 - 0.30 k/uL    WBC Morphology NOT REPORTED     RBC Morphology NOT REPORTED     Platelet Estimate NOT REPORTED    Comprehensive Metabolic Panel w/ Reflex to MG   Result Value Ref Range    Glucose 107 (H) 70 - 99 mg/dL    BUN 18 6 - 20 mg/dL    CREATININE 0.63 0.50 - 0.90 mg/dL    Bun/Cre Ratio NOT REPORTED 9 - 20    Calcium 9.7 8.6 - 10.4 mg/dL    Sodium 139 135 - 144 mmol/L    Potassium 3.9 3.7 - 5.3 mmol/L    Chloride 102 98 - 107 mmol/L    CO2 25 20 - 31 mmol/L    Anion Gap 12 9 - 17 mmol/L    Alkaline Phosphatase 145 (H) 35 - 104 U/L    ALT 22 5 - 33 U/L    AST 16 <32 U/L    Total Bilirubin 0.16 (L) 0.3 - 1.2 mg/dL    Total Protein 7.3 6.4 - 8.3 g/dL    Albumin 4.6 3.5 - 5.2 g/dL    Albumin/Globulin Ratio NOT REPORTED 1.0 - 2.5    GFR Non-African American >60 >60 mL/min    GFR African American >60 >60 mL/min    GFR Comment          GFR Staging NOT REPORTED    Troponin   Result Value Ref Range    Troponin, High Sensitivity <6 0 - 14 ng/L    Troponin T NOT REPORTED <0.03 ng/mL    Troponin Interp NOT REPORTED            RADIOLOGY:  Xr Chest Portable    Result Date: 4/20/2021  EXAMINATION: ONE XRAY VIEW OF THE CHEST 4/20/2021 12:58 pm COMPARISON: None. HISTORY: ORDERING SYSTEM PROVIDED HISTORY: CP with dizziness Reason for Exam: vertigo x 1 day Acuity: Acute Type of Exam: Initial FINDINGS: The lungs are without acute focal process. No effusion or pneumothorax. The cardiomediastinal silhouette is normal.  The osseous structures are intact without acute process. Negative chest.       EKG  EKG: normal EKG, normal sinus rhythm.     All EKG's are interpreted by the Emergency Department Physician who either signs or Co-signs this chart in the absence of a cardiologist.    EMERGENCY DEPARTMENT COURSE/IMPRESSION:  ED Course as of Apr 20 1450   Tue Apr 20, 2021   1245 PERC Score 0, <2%     [BH]      ED Course User Index  [BH] Corey Rodriguez MD     Ms. Marin Boone a 49 yo female presents with chest pain of 1 day at the advice of her PCP. In ED BP was 122/58 with normal HR, RR o2 sat, afebrile appearing non-toxic. CBC not acute. CMP with ALP of 145. Initial trop <6. PERC score 0, HEART score 2. CXR and EXG not acute. Pt eloped prior to completion of work up and treatment. PROCEDURES:  None    CONSULTS:  None    CRITICAL CARE:  None    FINAL IMPRESSION      1. Eloped from emergency department    2. Acute chest pain          DISPOSITION / PLAN     DISPOSITION Eloped - Left Before Treatment Complete 04/20/2021 02:45:57 PM      PATIENT REFERRED TO:  No follow-up provider specified.     DISCHARGE MEDICATIONS:  Discharge Medication List as of 4/20/2021  2:46 PM          Corey Rodriguez MD  Family Medicine Resident Emergency Department Service     (Please note that portions of this note were completed with a voice recognition program.  Efforts were made to edit the dictations but occasionally words aremis-transcribed.)        Corey Rodriguez MD  Resident  04/20/21 [id number]

## 2021-04-20 NOTE — ED NOTES
Pt states she is leaving. Pt removed her IV per self. Dr notified.       Arlene Esqueda, RN  04/20/21 3852

## 2021-04-20 NOTE — TELEPHONE ENCOUNTER
Pt c/o dizziness. Has a migraine and had cp that lasted for about 1 min. Woke up this am with the dizziness and gets sweaty. The cp episode was 11:00am. Advised er, if pt concerned that she is having a heart issue. Please advise.

## 2021-04-21 ENCOUNTER — OFFICE VISIT (OUTPATIENT)
Dept: PRIMARY CARE CLINIC | Age: 46
End: 2021-04-21
Payer: COMMERCIAL

## 2021-04-21 VITALS
HEART RATE: 95 BPM | OXYGEN SATURATION: 97 % | BODY MASS INDEX: 21.19 KG/M2 | TEMPERATURE: 97 F | SYSTOLIC BLOOD PRESSURE: 132 MMHG | DIASTOLIC BLOOD PRESSURE: 60 MMHG | WEIGHT: 131.3 LBS

## 2021-04-21 DIAGNOSIS — Z79.899 MEDICATION MANAGEMENT: ICD-10-CM

## 2021-04-21 DIAGNOSIS — J06.9 UPPER RESPIRATORY TRACT INFECTION, UNSPECIFIED TYPE: ICD-10-CM

## 2021-04-21 DIAGNOSIS — I95.1 ORTHOSTATIC HYPOTENSION: Primary | ICD-10-CM

## 2021-04-21 DIAGNOSIS — F41.9 ANXIETY: ICD-10-CM

## 2021-04-21 LAB
EKG ATRIAL RATE: 75 BPM
EKG P AXIS: 74 DEGREES
EKG P-R INTERVAL: 146 MS
EKG Q-T INTERVAL: 392 MS
EKG QRS DURATION: 80 MS
EKG QTC CALCULATION (BAZETT): 437 MS
EKG R AXIS: 76 DEGREES
EKG T AXIS: 45 DEGREES
EKG VENTRICULAR RATE: 75 BPM

## 2021-04-21 PROCEDURE — 93010 ELECTROCARDIOGRAM REPORT: CPT | Performed by: INTERNAL MEDICINE

## 2021-04-21 PROCEDURE — G8420 CALC BMI NORM PARAMETERS: HCPCS | Performed by: NURSE PRACTITIONER

## 2021-04-21 PROCEDURE — 4004F PT TOBACCO SCREEN RCVD TLK: CPT | Performed by: NURSE PRACTITIONER

## 2021-04-21 PROCEDURE — 99214 OFFICE O/P EST MOD 30 MIN: CPT | Performed by: NURSE PRACTITIONER

## 2021-04-21 PROCEDURE — G8427 DOCREV CUR MEDS BY ELIG CLIN: HCPCS | Performed by: NURSE PRACTITIONER

## 2021-04-21 RX ORDER — GUAIFENESIN 1200 MG/1
1200 TABLET, EXTENDED RELEASE ORAL 2 TIMES DAILY PRN
Qty: 30 TABLET | Refills: 0 | Status: SHIPPED | OUTPATIENT
Start: 2021-04-21 | End: 2021-07-12 | Stop reason: SDUPTHER

## 2021-04-21 ASSESSMENT — ENCOUNTER SYMPTOMS
DIARRHEA: 0
NAUSEA: 0
BACK PAIN: 0
CONSTIPATION: 0
SINUS PRESSURE: 0
SHORTNESS OF BREATH: 0
COUGH: 0
SINUS PAIN: 0

## 2021-04-21 NOTE — PATIENT INSTRUCTIONS
Compression stockings daily - on in AM and off before bed. Hand wash and dry stockings each evening. Limit loud obnoxious music. Mucinex 1200 mg tablet 2x/day as needed for congestion. If cold symptoms do not clear call us back.

## 2021-04-21 NOTE — PROGRESS NOTES
URETERAL CATHETER INSERTION performed by Vira Orellana MD at [address] EXTRACTION         Family History   Problem Relation Age of Onset    Colon Cancer Paternal Grandfather        Social History     Tobacco Use    Smoking status: Current Every Day Smoker     Packs/day: 0.75     Years: 20.00     Pack years: 15.00     Types: Cigarettes    Smokeless tobacco: Never Used   Substance Use Topics    Alcohol use: No     Comment: social      Current Outpatient Medications   Medication Sig Dispense Refill    Elastic Bandages & Supports (MEDICAL COMPRESSION STOCKINGS) MISC 2 each by Does not apply route daily Right and left knee high compression stockings. 30-40 mmHg. To be worn continuously throughout the day. 4 each 3    guaiFENesin (MUCINEX MAXIMUM STRENGTH) 1200 MG TB12 Take 1,200 mg by mouth 2 times daily as needed (congestion) 30 tablet 0    sulfamethoxazole-trimethoprim (BACTRIM DS;SEPTRA DS) 800-160 MG per tablet Take 1 tablet by mouth 2 times daily for 10 days 20 tablet 0    ibuprofen (ADVIL;MOTRIN) 800 MG tablet Take 1 tablet by mouth 3 times daily (with meals) 90 tablet 1    butalbital-acetaminophen-caffeine (FIORICET, ESGIC) -40 MG per tablet Take 1 tablet by mouth every 6 hours as needed for Headaches 60 tablet 0    traZODone (DESYREL) 150 MG tablet Take 1 tablet by mouth nightly 30 tablet 0    ALPRAZolam (XANAX) 1 MG tablet Take 1 tablet by mouth 2 times daily as needed for Sleep or Anxiety for up to 30 days.  60 tablet 0    triamcinolone (KENALOG) 0.1 % ointment Apply topically 2 times daily 1 Tube 3    Black Cohosh-Soy-Ginkgo-Magnol (ESTROVEN MOOD & MEMORY) TABS Take 1 tablet by mouth daily 30 tablet 6    fluticasone (FLONASE) 50 MCG/ACT nasal spray 2 sprays by Each Nostril route daily 1 Bottle 0    megestrol (MEGACE) 40 MG/ML suspension Take 10 mLs by mouth daily      diclofenac (VOLTAREN) 50 MG EC tablet Take 50 mg by mouth nightly as needed      B Complex-C (SUPER B Placed This Encounter   Medications    Elastic Bandages & Supports (MEDICAL COMPRESSION STOCKINGS) MISC     Si each by Does not apply route daily Right and left knee high compression stockings. 30-40 mmHg. To be worn continuously throughout the day. Dispense:  4 each     Refill:  3    guaiFENesin (MUCINEX MAXIMUM STRENGTH) 1200 MG TB12     Sig: Take 1,200 mg by mouth 2 times daily as needed (congestion)     Dispense:  30 tablet     Refill:  0       Patient given educational materials - see patient instructions. Discussed use, benefit, and side effects of prescribed medications. All patient questions answered. Pt voiced understanding. Reviewed health maintenance. Instructed to continue current medications, diet and exercise. Patient agreed with treatment plan. Follow up as directed.      Electronically signed by ANITA Gatica CNP on 2021 at 9:45 AM

## 2021-04-29 DIAGNOSIS — F41.9 ANXIETY: ICD-10-CM

## 2021-04-29 DIAGNOSIS — F51.01 PRIMARY INSOMNIA: ICD-10-CM

## 2021-04-29 DIAGNOSIS — Z79.899 MEDICATION MANAGEMENT: ICD-10-CM

## 2021-04-29 RX ORDER — ALPRAZOLAM 1 MG/1
1 TABLET ORAL 2 TIMES DAILY PRN
Qty: 60 TABLET | Refills: 0 | Status: SHIPPED | OUTPATIENT
Start: 2021-04-29 | End: 2021-05-27 | Stop reason: SDUPTHER

## 2021-04-29 RX ORDER — TRAZODONE HYDROCHLORIDE 150 MG/1
150 TABLET ORAL NIGHTLY
Qty: 30 TABLET | Refills: 0 | Status: SHIPPED | OUTPATIENT
Start: 2021-04-29 | End: 2021-05-27 | Stop reason: SDUPTHER

## 2021-05-17 NOTE — TELEPHONE ENCOUNTER
Patient has tried heating pad & motrin for her lower back pain. Pt is requesting refill on Diclofenac and requesting a muscle relaxer for pain.

## 2021-05-27 DIAGNOSIS — F41.9 ANXIETY: ICD-10-CM

## 2021-05-27 DIAGNOSIS — Z79.899 MEDICATION MANAGEMENT: ICD-10-CM

## 2021-05-27 DIAGNOSIS — F51.01 PRIMARY INSOMNIA: ICD-10-CM

## 2021-05-28 RX ORDER — ALPRAZOLAM 1 MG/1
1 TABLET ORAL 2 TIMES DAILY PRN
Qty: 60 TABLET | Refills: 0 | Status: SHIPPED | OUTPATIENT
Start: 2021-05-28 | End: 2021-06-24 | Stop reason: SDUPTHER

## 2021-05-28 RX ORDER — TRAZODONE HYDROCHLORIDE 150 MG/1
150 TABLET ORAL NIGHTLY
Qty: 30 TABLET | Refills: 0 | Status: SHIPPED | OUTPATIENT
Start: 2021-05-28 | End: 2021-06-24 | Stop reason: SDUPTHER

## 2021-06-10 ENCOUNTER — HOSPITAL ENCOUNTER (EMERGENCY)
Age: 46
Discharge: HOME | End: 2021-06-10
Attending: EMERGENCY MEDICINE
Payer: COMMERCIAL

## 2021-06-10 ENCOUNTER — APPOINTMENT (OUTPATIENT)
Dept: GENERAL RADIOLOGY | Age: 46
End: 2021-06-10
Payer: COMMERCIAL

## 2021-06-10 VITALS
RESPIRATION RATE: 16 BRPM | HEART RATE: 92 BPM | DIASTOLIC BLOOD PRESSURE: 96 MMHG | SYSTOLIC BLOOD PRESSURE: 142 MMHG | TEMPERATURE: 98.8 F | OXYGEN SATURATION: 99 %

## 2021-06-10 DIAGNOSIS — S82.892A CLOSED LEFT ANKLE FRACTURE, INITIAL ENCOUNTER: Primary | ICD-10-CM

## 2021-06-10 DIAGNOSIS — S80.212A ABRASION OF LEFT KNEE, INITIAL ENCOUNTER: ICD-10-CM

## 2021-06-10 PROCEDURE — 6360000002 HC RX W HCPCS: Performed by: EMERGENCY MEDICINE

## 2021-06-10 PROCEDURE — 99284 EMERGENCY DEPT VISIT MOD MDM: CPT

## 2021-06-10 PROCEDURE — 73610 X-RAY EXAM OF ANKLE: CPT

## 2021-06-10 PROCEDURE — 96372 THER/PROPH/DIAG INJ SC/IM: CPT

## 2021-06-10 PROCEDURE — 73562 X-RAY EXAM OF KNEE 3: CPT

## 2021-06-10 PROCEDURE — 73630 X-RAY EXAM OF FOOT: CPT

## 2021-06-10 PROCEDURE — 29515 APPLICATION SHORT LEG SPLINT: CPT

## 2021-06-10 RX ORDER — MORPHINE SULFATE 10 MG/ML
10 INJECTION, SOLUTION INTRAMUSCULAR; INTRAVENOUS ONCE
Status: COMPLETED | OUTPATIENT
Start: 2021-06-10 | End: 2021-06-10

## 2021-06-10 RX ORDER — HYDROCODONE BITARTRATE AND ACETAMINOPHEN 5; 325 MG/1; MG/1
1 TABLET ORAL EVERY 6 HOURS PRN
Qty: 20 TABLET | Refills: 0 | Status: SHIPPED | OUTPATIENT
Start: 2021-06-10 | End: 2021-06-15

## 2021-06-10 RX ADMIN — MORPHINE SULFATE 10 MG: 10 INJECTION, SOLUTION INTRAMUSCULAR; INTRAVENOUS at 14:55

## 2021-06-10 ASSESSMENT — PAIN SCALES - GENERAL
PAINLEVEL_OUTOF10: 10
PAINLEVEL_OUTOF10: 10

## 2021-06-10 ASSESSMENT — PAIN DESCRIPTION - FREQUENCY: FREQUENCY: INTERMITTENT

## 2021-06-10 ASSESSMENT — PAIN DESCRIPTION - ORIENTATION: ORIENTATION: LEFT

## 2021-06-10 ASSESSMENT — PAIN DESCRIPTION - LOCATION: LOCATION: KNEE

## 2021-06-10 ASSESSMENT — PAIN DESCRIPTION - PAIN TYPE: TYPE: ACUTE PAIN

## 2021-06-10 ASSESSMENT — PAIN DESCRIPTION - DESCRIPTORS: DESCRIPTORS: ACHING

## 2021-06-10 NOTE — ED PROVIDER NOTES
[address]      Pt Name: Rebecca Cuellar  MRN: [id number]  Mariamgfedd 1975  Date of evaluation: 6/10/2021      CHIEF COMPLAINT       Chief Complaint   Patient presents with    Knee Pain         HISTORY OF PRESENT ILLNESS      The patient presents with left knee and ankle pain after a fall. She was carrying a bag of water bottles and tripped down 3 stairs. She says her knee buckled under her. She scraped her knee and now has pain in the knee and on the lateral ankle and foot of the left lower extremity. The patient did not strike her head or lose consciousness. She denies weakness or numbness. She did drive herself today. She took some ibuprofen at home. REVIEW OF SYSTEMS       All systems reviewed and negative unless noted in HPI. The patient denies fever or constitutional symptoms. Denies vision change. Denies any sore throat or rhinorrhea. Denies any neck pain or stiffness. Denies chest pain or shortness of breath. Left lower extremity injury as noted in HPI. Denies any weakness, numbness or focal neurologic deficit. Denies any skin rash or edema. No recent psychiatric issues. No easy bruising or bleeding. Denies any polyuria, polydypsia or history of immunocompromise. PAST MEDICAL HISTORY    has a past medical history of Anxiety, Endometriosis, Fibroid tumor, Ovarian cyst, and Wears dentures. SURGICAL HISTORY      has a past surgical history that includes Breast surgery (2004); Hysterectomy; [place] tooth extraction; laparoscopy (N/A, 9/24/2020); pr catheter, ureteral (Bilateral, 9/24/2020); and laparoscopic appendectomy (N/A, 9/24/2020). CURRENT MEDICATIONS       Previous Medications    ALPRAZOLAM (XANAX) 1 MG TABLET    Take 1 tablet by mouth 2 times daily as needed for Sleep or Anxiety for up to 30 days.     B COMPLEX-C (SUPER B COMPLEX PO)    Take by mouth    BLACK COHOSH-SOY-GINKGO-MAGNOL (ESTROVEN MOOD & with no gallops, murmurs, or rubs. Lungs clear, no wheezes, rales or rhonchi. Abdomen: soft, nontender. Musculoskeletal exam: 2 cm abrasion noted on the patient's left knee. No joint effusion. Patient can flex and extend normally. No pain with internal or external rotation of the left hip. Patient locates her pain in the knee in the popliteal area but there is no swelling. No ligamentous laxity to the knee. Examination patient's ankle demonstrates swelling but no obvious deformity in the lateral malleoli area. No open wound. No medial malleoli or pain. Mild discomfort along the lateral border of the foot. Normal dorsalis pedis pulse. Patient has intact sensation and motion of the toes. The remainder the musculoskeletal exam is unremarkable. Skin: no rash or edema. Neurological exam reveals cranial nerves 2 through 12 grossly intact. Patient has equal  and normal deep tendon reflexes. DIFFERENTIAL DIAGNOSIS/ MDM:     Sprain, fracture, abrasion, contusion    DIAGNOSTIC RESULTS         RADIOLOGY:   I reviewed the radiologist interpretations:  XR KNEE LEFT (3 VIEWS)   Final Result   No acute osseous abnormality identified. XR ANKLE LEFT (MIN 3 VIEWS)   Final Result   1. Oblique nondisplaced fracture of the distal fibular shaft. Ankle mortise   alignment appears maintained. 2.  No acute osseous abnormality identified in the foot. XR FOOT LEFT (MIN 3 VIEWS)   Final Result   1. Oblique nondisplaced fracture of the distal fibular shaft. Ankle mortise   alignment appears maintained. 2.  No acute osseous abnormality identified in the foot. XR KNEE LEFT (3 VIEWS) (Final result)  Result time 06/10/21 14:32:18  Final result by Tommy Maria MD (06/10/21 14:32:18)                Impression:    No acute osseous abnormality identified.              Narrative:    EXAMINATION:   THREE XRAY VIEWS OF THE LEFT KNEE     6/10/2021 2:19 pm     COMPARISON: None.     HISTORY:   ORDERING SYSTEM PROVIDED HISTORY: fall   TECHNOLOGIST PROVIDED HISTORY:   fall   Reason for Exam:  Pt fell today injuring her left leg. Pain to anterior knee   and also to lateral side of foot and ankle   Acuity: Acute   Type of Exam: Initial   Mechanism of Injury: fall today     FINDINGS:   No fracture or malalignment identified.  The joint spaces are maintained.  No   discrete soft tissue abnormality identified.                     XR ANKLE LEFT (MIN 3 VIEWS) (Final result)  Result time 06/10/21 14:31:48  Final result by Mckenna Leung MD (06/10/21 14:31:48)                Impression:    1.  Oblique nondisplaced fracture of the distal fibular shaft.  Ankle mortise   alignment appears maintained. 2.  No acute osseous abnormality identified in the foot. Narrative:    EXAMINATION:   THREE XRAY VIEWS OF THE LEFT FOOT; THREE XRAY VIEWS OF THE LEFT ANKLE     6/10/2021 2:19 pm     COMPARISON:   None. HISTORY:   ORDERING SYSTEM PROVIDED HISTORY: fall   TECHNOLOGIST PROVIDED HISTORY:   fall   Reason for Exam: Pt fell today injuring her left leg. Pain to anterior knee   and also to lateral side of foot and ankle   Acuity: Acute   Type of Exam: Initial   Mechanism of Injury: fall today     FINDINGS:   ANKLE:  The ankle mortise is maintained.  Oblique nondisplaced fracture of   the distal fibular shaft.  No acute soft tissue abnormality is appreciated. No significant arthropathic features. FOOT:  No fracture or malalignment identified.  The joint spaces are   maintained.  No discrete soft tissue abnormality identified.                     XR FOOT LEFT (MIN 3 VIEWS) (Final result)  Result time 06/10/21 14:31:48  Final result by Mckenna Leung MD (06/10/21 14:31:48)                Impression:    1.  Oblique nondisplaced fracture of the distal fibular shaft.  Ankle mortise   alignment appears maintained. 2.  No acute osseous abnormality identified in the foot.              Narrative: EXAMINATION:   THREE XRAY VIEWS OF THE LEFT FOOT; THREE XRAY VIEWS OF THE LEFT ANKLE     6/10/2021 2:19 pm     COMPARISON:   None. HISTORY:   ORDERING SYSTEM PROVIDED HISTORY: fall   TECHNOLOGIST PROVIDED HISTORY:   fall   Reason for Exam: Pt fell today injuring her left leg. Pain to anterior knee   and also to lateral side of foot and ankle   Acuity: Acute   Type of Exam: Initial   Mechanism of Injury: fall today     FINDINGS:   ANKLE:  The ankle mortise is maintained.  Oblique nondisplaced fracture of   the distal fibular shaft.  No acute soft tissue abnormality is appreciated. No significant arthropathic features. FOOT:  No fracture or malalignment identified.  The joint spaces are   maintained.  No discrete soft tissue abnormality identified.                       EMERGENCY DEPARTMENT COURSE:   Vitals:    Vitals:    06/10/21 1358   BP: (!) 142/96   Pulse: 92   Resp: 16   Temp: 98.8 °F (37.1 °C)   TempSrc: Oral   SpO2: 99%     -------------------------  BP: (!) 142/96, Temp: 98.8 °F (37.1 °C), Pulse: 92, Resp: 16      Re-evaluation Notes    The patient was placed in a splint by me. She was provided crutches. I will write for medication for pain. I made an appointment for her for orthopedics tomorrow at 9:30 AM.  The patient is discharged in good condition. Controlled Substance Monitoring:    Acute and Chronic Pain Monitoring:   RX Monitoring 4/12/2021   Attestation -   Periodic Controlled Substance Monitoring Possible medication side effects, risk of tolerance/dependence & alternative treatments discussed. ;No signs of potential drug abuse or diversion identified. ;Assessed functional status. ;Random urine drug screen sent today. PROCEDURES:    Splint application: I placed the patient in a short leg posterior Ortho-Glass splint. The patient had good color, sensation, and motion of the toes after placement. FINAL IMPRESSION      1. Closed left ankle fracture, initial encounter    2. Abrasion of left knee, initial encounter          DISPOSITION/PLAN   DISPOSITION        Condition on Disposition    good    PATIENT REFERRED TO:  the orthopedist    On 6/11/2021  at 9:30 am      DISCHARGE MEDICATIONS:  New Prescriptions    HYDROCODONE-ACETAMINOPHEN (NORCO) 5-325 MG PER TABLET    Take 1 tablet by mouth every 6 hours as needed for Pain for up to 5 days.        (Please note that portions of this note were completed with a voice recognition program.  Efforts were made to edit the dictations but occasionally words are mis-transcribed.)    Luci Gonzalez MD,, MD   Attending Emergency Physician         Radha Saleh MD  06/10/21 [id number]

## 2021-06-11 ENCOUNTER — OFFICE VISIT (OUTPATIENT)
Dept: ORTHOPEDIC SURGERY | Age: 46
End: 2021-06-11
Payer: COMMERCIAL

## 2021-06-11 VITALS — WEIGHT: 131 LBS | HEIGHT: 66 IN | BODY MASS INDEX: 21.05 KG/M2

## 2021-06-11 DIAGNOSIS — S82.65XA CLOSED NONDISPLACED FRACTURE OF LATERAL MALLEOLUS OF LEFT FIBULA, INITIAL ENCOUNTER: Primary | ICD-10-CM

## 2021-06-11 PROCEDURE — G8427 DOCREV CUR MEDS BY ELIG CLIN: HCPCS | Performed by: PHYSICIAN ASSISTANT

## 2021-06-11 PROCEDURE — G8420 CALC BMI NORM PARAMETERS: HCPCS | Performed by: PHYSICIAN ASSISTANT

## 2021-06-11 PROCEDURE — 4004F PT TOBACCO SCREEN RCVD TLK: CPT | Performed by: PHYSICIAN ASSISTANT

## 2021-06-11 PROCEDURE — 99204 OFFICE O/P NEW MOD 45 MIN: CPT | Performed by: PHYSICIAN ASSISTANT

## 2021-06-11 NOTE — PROGRESS NOTES
OR    WISDOM TOOTH EXTRACTION         Medications:  Current Outpatient Medications   Medication Sig Dispense Refill    HYDROcodone-acetaminophen (NORCO) 5-325 MG per tablet Take 1 tablet by mouth every 6 hours as needed for Pain for up to 5 days. 20 tablet 0    traZODone (DESYREL) 150 MG tablet Take 1 tablet by mouth nightly 30 tablet 0    ALPRAZolam (XANAX) 1 MG tablet Take 1 tablet by mouth 2 times daily as needed for Sleep or Anxiety for up to 30 days. 60 tablet 0    diclofenac (VOLTAREN) 50 MG EC tablet Take 1 tablet by mouth nightly as needed for Pain 60 tablet 0    Elastic Bandages & Supports (MEDICAL COMPRESSION STOCKINGS) MISC 2 each by Does not apply route daily Right and left knee high compression stockings. 30-40 mmHg. To be worn continuously throughout the day. 4 each 3    guaiFENesin (MUCINEX MAXIMUM STRENGTH) 1200 MG TB12 Take 1,200 mg by mouth 2 times daily as needed (congestion) 30 tablet 0    ibuprofen (ADVIL;MOTRIN) 800 MG tablet Take 1 tablet by mouth 3 times daily (with meals) 90 tablet 1    butalbital-acetaminophen-caffeine (FIORICET, ESGIC) -40 MG per tablet Take 1 tablet by mouth every 6 hours as needed for Headaches 60 tablet 0    triamcinolone (KENALOG) 0.1 % ointment Apply topically 2 times daily 1 Tube 3    Black Cohosh-Soy-Ginkgo-Magnol (ESTROVEN MOOD & MEMORY) TABS Take 1 tablet by mouth daily 30 tablet 6    fluticasone (FLONASE) 50 MCG/ACT nasal spray 2 sprays by Each Nostril route daily 1 Bottle 0    B Complex-C (SUPER B COMPLEX PO) Take by mouth      megestrol (MEGACE) 40 MG/ML suspension Take 10 mLs by mouth daily       No current facility-administered medications for this visit.        Allergies:   @    Family History:  Family History   Problem Relation Age of Onset    Colon Cancer Paternal Grandfather         Social History:   Social History     Socioeconomic History    Marital status: Unknown     Spouse name: Not on file    Number of children: Not on file  Years of education: Not on file    Highest education level: Not on file   Occupational History    Not on file   Tobacco Use    Smoking status: Current Every Day Smoker     Packs/day: 0.75     Years: 20.00     Pack years: 15.00     Types: Cigarettes    Smokeless tobacco: Never Used   Vaping Use    Vaping Use: Never used   Substance and Sexual Activity    Alcohol use: No     Comment: social    Drug use: Not Currently     Frequency: 7.0 times per week     Types: Marijuana    Sexual activity: Not on file   Other Topics Concern    Not on file   Social History Narrative    ** Merged History Encounter **          Social Determinants of Health     Financial Resource Strain:     Difficulty of Paying Living Expenses:    Food Insecurity:     Worried About Running Out of Food in the Last Year:     Ran Out of Food in the Last Year:    Transportation Needs:     Lack of Transportation (Medical):      Lack of Transportation (Non-Medical):    Physical Activity:     Days of Exercise per Week:     Minutes of Exercise per Session:    Stress:     Feeling of Stress :    Social Connections:     Frequency of Communication with Friends and Family:     Frequency of Social Gatherings with Friends and Family:     Attends [employer] Services:     Active Member of Clubs or Organizations:     Attends Club or Organization Meetings:     Marital Status:    Intimate Partner Violence:     Fear of Current or Ex-Partner:     Emotionally Abused:     Physically Abused:     Sexually Abused:      Social History     Substance and Sexual Activity   Drug Use Not Currently    Frequency: 7.0 times per week    Types: Marijuana     Social History     Substance and Sexual Activity   Alcohol Use No    Comment: social     Social History     Tobacco Use   Smoking Status Current Every Day Smoker    Packs/day: 0.75    Years: 20.00    Pack years: 15.00    Types: Cigarettes   Smokeless Tobacco Never Used         PHYSICAL EXAM    Vitals: Patient is fitted for a cam boot today instructed to remain partial weightbearing as tolerated until follow-up with him as arranged.   3.  Appointment is made with Dr. Andino Clos    Return to my clinic on a as needed basis

## 2021-06-14 ENCOUNTER — OFFICE VISIT (OUTPATIENT)
Dept: ORTHOPEDIC SURGERY | Age: 46
End: 2021-06-14
Payer: COMMERCIAL

## 2021-06-14 VITALS — BODY MASS INDEX: 20.89 KG/M2 | HEIGHT: 66 IN | TEMPERATURE: 97 F | WEIGHT: 130 LBS

## 2021-06-14 DIAGNOSIS — S82.65XA CLOSED NONDISPLACED FRACTURE OF LATERAL MALLEOLUS OF LEFT FIBULA, INITIAL ENCOUNTER: Primary | ICD-10-CM

## 2021-06-14 DIAGNOSIS — M25.572 LEFT ANKLE PAIN, UNSPECIFIED CHRONICITY: Primary | ICD-10-CM

## 2021-06-14 PROCEDURE — G8420 CALC BMI NORM PARAMETERS: HCPCS | Performed by: ORTHOPAEDIC SURGERY

## 2021-06-14 PROCEDURE — 99204 OFFICE O/P NEW MOD 45 MIN: CPT | Performed by: ORTHOPAEDIC SURGERY

## 2021-06-14 PROCEDURE — G8427 DOCREV CUR MEDS BY ELIG CLIN: HCPCS | Performed by: ORTHOPAEDIC SURGERY

## 2021-06-14 PROCEDURE — 4004F PT TOBACCO SCREEN RCVD TLK: CPT | Performed by: ORTHOPAEDIC SURGERY

## 2021-06-14 RX ORDER — TRAMADOL HYDROCHLORIDE 50 MG/1
50 TABLET ORAL EVERY 6 HOURS PRN
Qty: 20 TABLET | Refills: 0 | Status: SHIPPED | OUTPATIENT
Start: 2021-06-14 | End: 2021-06-21

## 2021-06-14 RX ORDER — ASPIRIN 325 MG
325 TABLET, DELAYED RELEASE (ENTERIC COATED) ORAL DAILY
Qty: 42 TABLET | Refills: 0 | Status: SHIPPED | OUTPATIENT
Start: 2021-06-14 | End: 2021-08-27

## 2021-06-14 NOTE — PROGRESS NOTES
Rfl: 3    guaiFENesin (MUCINEX MAXIMUM STRENGTH) 1200 MG TB12, Take 1,200 mg by mouth 2 times daily as needed (congestion), Disp: 30 tablet, Rfl: 0    ibuprofen (ADVIL;MOTRIN) 800 MG tablet, Take 1 tablet by mouth 3 times daily (with meals), Disp: 90 tablet, Rfl: 1    butalbital-acetaminophen-caffeine (FIORICET, ESGIC) -40 MG per tablet, Take 1 tablet by mouth every 6 hours as needed for Headaches, Disp: 60 tablet, Rfl: 0    triamcinolone (KENALOG) 0.1 % ointment, Apply topically 2 times daily, Disp: 1 Tube, Rfl: 3    Black Cohosh-Soy-Ginkgo-Magnol (ESTROVEN MOOD & MEMORY) TABS, Take 1 tablet by mouth daily, Disp: 30 tablet, Rfl: 6    fluticasone (FLONASE) 50 MCG/ACT nasal spray, 2 sprays by Each Nostril route daily, Disp: 1 Bottle, Rfl: 0    B Complex-C (SUPER B COMPLEX PO), Take by mouth, Disp: , Rfl:     traZODone (DESYREL) 150 MG tablet, Take 1 tablet by mouth nightly, Disp: 30 tablet, Rfl: 0    ALPRAZolam (XANAX) 1 MG tablet, Take 1 tablet by mouth 2 times daily as needed for Sleep or Anxiety for up to 30 days. , Disp: 60 tablet, Rfl: 0    aspirin 325 MG EC tablet, Take 1 tablet by mouth daily, Disp: 42 tablet, Rfl: 0    megestrol (MEGACE) 40 MG/ML suspension, Take 10 mLs by mouth daily, Disp: , Rfl:      Allergies:    Diphenhydramine and Sleep aid [diphenhydramine hcl (sleep)]    Family History:  family history includes Colon Cancer in her paternal grandfather.     Social History:   Social History     Occupational History    Not on file   Tobacco Use    Smoking status: Current Every Day Smoker     Packs/day: 0.75     Years: 20.00     Pack years: 15.00     Types: Cigarettes    Smokeless tobacco: Never Used   Vaping Use    Vaping Use: Never used   Substance and Sexual Activity    Alcohol use: No     Comment: social    Drug use: Not Currently     Frequency: 7.0 times per week     Types: Marijuana    Sexual activity: Not on file     Occupation: Lost job due to [address]:  Temp 97 °F (36.1 °C)   Ht 5' 6\" (1.676 m)   Wt 130 lb (59 kg)   LMP 02/26/2018   BMI 20.98 kg/m²    Psych: alert and oriented to person, time, and place   Cardio:  well perfused extremities, no cyanosis   Resp:  normal respiratory effort  Musculoskeletal:    Affected lower extremity:    Vascular: Limb well perfused, compartments soft/compressible. Skin: No erythema/ulcers. Intact. Neurovascular Status:  Grossly neurovascularly intact throughout  Motion:  Grossly able to fire major muscle groups with appropriate/expected AROM  Tenderness to Palpation: Distal fibula  -Negative squeeze test for syndesmotic injury      RADIOLOGY:   6/14/2021 FINDINGS:  Three views (AP, Mortise, and Lateral) of the left ankle and three views (AP, Oblique, Lateral) of the left foot were obtained in the office today and reviewed, revealing none displaced Samaniego B distal fibula fracture. No widening of the medial clear space or syndesmosis. Limited studies. IMPRESSION:  Osseous injury as above. Electronically signed by Kirsten Morales MD      Relevant previous imaging reviewed, both imaging and report(s) as below:    XR KNEE LEFT (3 VIEWS)    Result Date: 6/10/2021  No acute osseous abnormality identified. XR ANKLE LEFT (MIN 3 VIEWS)    Result Date: 6/10/2021  1. Oblique nondisplaced fracture of the distal fibular shaft. Ankle mortise alignment appears maintained. 2.  No acute osseous abnormality identified in the foot. XR FOOT LEFT (MIN 3 VIEWS)    Result Date: 6/10/2021  1. Oblique nondisplaced fracture of the distal fibular shaft. Ankle mortise alignment appears maintained. 2.  No acute osseous abnormality identified in the foot. ASSESSMENT AND PLAN:  Body mass index is 20.98 kg/m². She has a left nondisplaced lateral malleolus fracture, sustained on 6/10/2021. Notably, she has the past medical history as above.   She has a history of tobacco use (reports that she smokes about 1 pack of cigarettes per concerned for the possibility of accidental overdose while at home. We did discuss the risks of opioid use, strategies to decrease the risks, and the patient understands these risks. This information did appear to be received rather poorly, as evidenced by the exclamation of \"oh my God\" with an exasperated tone. I did explain that I believe that this is in her best interest, as I would want the same thing for my family member, in order to help ensure safety. All questions were answered and the above plan was agreed upon. The patient will return to clinic in 2 weeks with repeat left ankle x-rays. At the patient's next visit, depending on how the patient is doing and/or new imaging/labs results, we may consider the following options:    []  Lace up ankle     []  CAM boot         []  removable wrist brace     []  PT:        []  Wean out immobilization         []  Adv activity      []  Footmind        []  Spenco       []  Custom Orthotic:               []  AZ brace                    []  Rocker Bottom      []  Night splint    []  Heel cups        []  Strap        []  Toe gizmos    []  Topl        []  NSAIDs         []  Bobo        []  Ref:         []  Stress Xray    []  CT        []  MRI  []  Inj:          []  Consider OR      []  Pick OR date    Return in about 2 weeks (around 2021). Orders Placed This Encounter   Medications    traMADol (ULTRAM) 50 MG tablet     Sig: Take 1 tablet by mouth every 6 hours as needed for Pain for up to 7 days. Take lowest dose possible to manage pain     Dispense:  20 tablet     Refill:  0     Reduce doses taken as pain becomes manageable    Naloxone HCl (NALOXONE OPIATE OVERDOSE KIT)     Si each by Nasal route once for 1 dose     Dispense:  1 kit     Refill:  0     No orders of the defined types were placed in this encounter.         Daniel Lancaster MD  Orthopedic Surgery        Please excuse any typos/errors, as this note was created with the assistance of

## 2021-06-14 NOTE — LETTER
Dr. Yamini Dupree  [address] [address] [place] [address] 36.  [phone]        6/14/2021     Patient: Charles Hurst  YOB: 1975    Dear ANITA Levy CNP,    I had the pleasure of seeing one of your patients, Charles Hurst recently in the office. Below are the relevant portions of my assessment and plan of care. ASSESSMENT AND PLAN:  She has a left nondisplaced lateral malleolus fracture, sustained on 6/10/2021. Notably, she has the past medical history as above. She has a history of tobacco use (reports that she smokes about 1 pack of cigarettes per day). We had a discussion today about the likely diagnosis and its natural history, physical exam and imaging findings, as well as various treatment options in detail. Surgically, we discussed a possible future left ankle open reduction internal fixation, however, due to the nondisplaced nature of her fracture, at today's visit, I recommended conservative management. The patient does wish to avoid surgery if possible. We did discuss the risks of displacement, and that while this does appear to be a stable fracture pattern radiographically, we do have limited radiographs today, secondary to her weightbearing status. We did discuss return to the office in 2 weeks for weightbearing views, prior to making a final surgical decision. Orders/referrals were placed as below at today's visit. The patient will avoid the routine use of NSAIDs to prevent the theoretical risk of delayed healing. The patient will avoid pain provoking activity. The patient may weight-bear as tolerated in her cam boot. We also had a discussion about the risk of blood clot and thromboembolic events.  The patient understands that there is an increased risk with surgery/immobilization, and understands nothing will completely eliminate the risk of DVT/PE's, and that any

## 2021-06-24 DIAGNOSIS — F41.9 ANXIETY: ICD-10-CM

## 2021-06-24 DIAGNOSIS — F51.01 PRIMARY INSOMNIA: ICD-10-CM

## 2021-06-24 DIAGNOSIS — Z79.899 MEDICATION MANAGEMENT: ICD-10-CM

## 2021-06-24 RX ORDER — ALPRAZOLAM 1 MG/1
1 TABLET ORAL 2 TIMES DAILY PRN
Qty: 60 TABLET | Refills: 0 | Status: SHIPPED | OUTPATIENT
Start: 2021-06-24 | End: 2021-07-22 | Stop reason: SDUPTHER

## 2021-06-24 RX ORDER — TRAZODONE HYDROCHLORIDE 150 MG/1
150 TABLET ORAL NIGHTLY
Qty: 30 TABLET | Refills: 0 | Status: SHIPPED | OUTPATIENT
Start: 2021-06-24 | End: 2021-07-22 | Stop reason: SDUPTHER

## 2021-07-02 ENCOUNTER — OFFICE VISIT (OUTPATIENT)
Dept: ORTHOPEDIC SURGERY | Age: 46
End: 2021-07-02
Payer: COMMERCIAL

## 2021-07-02 VITALS — HEIGHT: 66 IN | BODY MASS INDEX: 20.89 KG/M2 | TEMPERATURE: 98.2 F | WEIGHT: 130 LBS | RESPIRATION RATE: 12 BRPM

## 2021-07-02 DIAGNOSIS — M25.572 LEFT ANKLE PAIN, UNSPECIFIED CHRONICITY: Primary | ICD-10-CM

## 2021-07-02 DIAGNOSIS — Z91.199 NONCOMPLIANCE: ICD-10-CM

## 2021-07-02 DIAGNOSIS — S82.65XD CLOSED NONDISPLACED FRACTURE OF LATERAL MALLEOLUS OF LEFT FIBULA WITH ROUTINE HEALING, SUBSEQUENT ENCOUNTER: Primary | ICD-10-CM

## 2021-07-02 PROCEDURE — G8427 DOCREV CUR MEDS BY ELIG CLIN: HCPCS | Performed by: ORTHOPAEDIC SURGERY

## 2021-07-02 PROCEDURE — 4004F PT TOBACCO SCREEN RCVD TLK: CPT | Performed by: ORTHOPAEDIC SURGERY

## 2021-07-02 PROCEDURE — G8420 CALC BMI NORM PARAMETERS: HCPCS | Performed by: ORTHOPAEDIC SURGERY

## 2021-07-02 PROCEDURE — 99213 OFFICE O/P EST LOW 20 MIN: CPT | Performed by: ORTHOPAEDIC SURGERY

## 2021-07-02 NOTE — PROGRESS NOTES
[facility]  [address] [address]  Dept: [phone]    Ambulatory Orthopedic Consult      CHIEF COMPLAINT:    Chief Complaint   Patient presents with    Ankle Pain     LEFT       HISTORY OF PRESENT ILLNESS:      The patient is a 48 y.o. female who is being seen for evaluation of the above, which began 6/10/2021 secondary to a fall downstairs  . At today's visit, she is using a brace/boot. History is obtained today from:   [x]  the patient     [x]  EMR     [x]  one family member/friend --her mother   []  multiple family members/friends    []  other:      INTERVAL HISTORY 7/2/2021:  She is seen again today in the office for follow up of a previous issue (as above). Since being seen last, the patient is doing about the same overall. At today's visit, she is using a removable brace. History is obtained today from:   [x]  the patient     []  EMR     []  one family member/friend    []  multiple family members/friends    []  other:             REVIEW OF SYSTEMS:  Constitutional: Negative for fever. HENT: Negative for tinnitus. Eyes: Negative for pain. Respiratory: Negative for shortness of breath. Cardiovascular: Negative for chest pain. Gastrointestinal: Negative for abdominal pain. Genitourinary: Negative for dysuria. Skin: Negative for rash. Neurological: Negative for headaches. Hematological: Does not bruise/bleed easily. Musculoskeletal: See HPI for pertinent positives     Past Medical History:    She  has a past medical history of Anxiety, Endometriosis, Fibroid tumor (2017), Ovarian cyst, and Wears dentures. Past Surgical History:    She  has a past surgical history that includes Breast surgery (2004); Hysterectomy; [place] tooth extraction; laparoscopy (N/A, 9/24/2020); pr catheter, ureteral (Bilateral, 9/24/2020); and laparoscopic appendectomy (N/A, 9/24/2020).      Current Medications: Current Outpatient Medications:     traZODone (DESYREL) 150 MG tablet, Take 1 tablet by mouth nightly, Disp: 30 tablet, Rfl: 0    ALPRAZolam (XANAX) 1 MG tablet, Take 1 tablet by mouth 2 times daily as needed for Sleep or Anxiety for up to 30 days. , Disp: 60 tablet, Rfl: 0    aspirin 325 MG EC tablet, Take 1 tablet by mouth daily, Disp: 42 tablet, Rfl: 0    diclofenac (VOLTAREN) 50 MG EC tablet, Take 1 tablet by mouth nightly as needed for Pain, Disp: 60 tablet, Rfl: 0    Elastic Bandages & Supports ([address]) MISC, 2 each by Does not apply route daily Right and left knee high compression stockings. 30-40 mmHg. To be worn continuously throughout the day., Disp: 4 each, Rfl: 3    guaiFENesin (MUCINEX MAXIMUM STRENGTH) 1200 MG TB12, Take 1,200 mg by mouth 2 times daily as needed (congestion), Disp: 30 tablet, Rfl: 0    ibuprofen (ADVIL;MOTRIN) 800 MG tablet, Take 1 tablet by mouth 3 times daily (with meals), Disp: 90 tablet, Rfl: 1    butalbital-acetaminophen-caffeine (FIORICET, ESGIC) -40 MG per tablet, Take 1 tablet by mouth every 6 hours as needed for Headaches, Disp: 60 tablet, Rfl: 0    triamcinolone (KENALOG) 0.1 % ointment, Apply topically 2 times daily, Disp: 1 Tube, Rfl: 3    Black Cohosh-Soy-Ginkgo-Magnol (ESTROVEN MOOD & MEMORY) TABS, Take 1 tablet by mouth daily, Disp: 30 tablet, Rfl: 6    fluticasone (FLONASE) 50 MCG/ACT nasal spray, 2 sprays by Each Nostril route daily, Disp: 1 Bottle, Rfl: 0    B Complex-C (SUPER B COMPLEX PO), Take by mouth, Disp: , Rfl:     megestrol (MEGACE) 40 MG/ML suspension, Take 10 mLs by mouth daily, Disp: , Rfl:      Allergies:    Diphenhydramine and Sleep aid [diphenhydramine hcl (sleep)]    Family History:  family history includes Colon Cancer in her paternal grandfather.     Social History:   Social History     Occupational History    Not on file   Tobacco Use    Smoking status: Current Every Day Smoker     Packs/day: 0.75 maintained. 2.  No acute osseous abnormality identified in the foot. ASSESSMENT AND PLAN:  Body mass index is 20.98 kg/m². She has a left nondisplaced lateral malleolus fracture, sustained on 6/10/2021. Notably, she has the past medical history as above. She has a history of tobacco use (reports that she smokes about 1 pack of cigarettes per day). We had a discussion today about the likely diagnosis and its natural history, physical exam and imaging findings, as well as various treatment options in detail. Surgically, we discussed I do not recommend surgical invention, and recommended conservative management, as her fracture appears stable on weightbearing radiographs. Orders/referrals were placed as below at today's visit. She will continue to avoid the routine use of NSAIDs and pain provoking activity. She may continue weightbearing as tolerated in her cam boot. She does report that the cam boot has been rubbing on her posterior heel, and we discussed using a thicker sock or a superficial padding (for example a Band-Aid). She may remove the boot at night to sleep. She does report that she is not been taking the aspirin daily, and I did recommend that she do that, and we again discussed the risks of DVTs and pulmonary embolus.    -She has previously been prescribed tramadol for acute pain control, along with Narcan (due to her concomitant use of alprazolam), and she does state today that she was \"offended by this\", and explained that she would never misuse medications. We discussed that this was merely a safety issue, and we again discussed the risks of accidental overdose. All questions were answered and the above plan was agreed upon. The patient will return to clinic in 4 weeks with repeat left ankle x-rays. At her next visit, I anticipate progressing her activity with the help of physical therapy and weaning her out of the boot into a lace up ankle brace.            At the patient's next visit, depending on how the patient is doing and/or new imaging/labs results, we may consider the following options:    []  Lace up ankle     []  CAM boot         []  removable wrist brace     []  PT:        []  Wean out immobilization         []  Adv activity      []  Footmind        []  Spenco       []  Custom Orthotic:               []  AZ brace                    []  Rocker Bottom      []  Night splint    []  Heel cups        []  Strap        []  Toe gizmos    []  Topl        []  NSAIDs         []  Bobo        []  Ref:         []  Stress Xray    []  CT        []  MRI  []  Inj:          []  Consider OR      []  Pick OR date    No follow-ups on file. No orders of the defined types were placed in this encounter. No orders of the defined types were placed in this encounter. Jelani Nobles MD  Orthopedic Surgery        Please excuse any typos/errors, as this note was created with the assistance of voice recognition software. While intending to generate a document that actually reflects the content of the visit, the document can still have some errors including those of syntax and sound-a-like substitutions which may escape proof reading. In such instances, actual meaning can be extrapolated by context.

## 2021-07-12 ENCOUNTER — OFFICE VISIT (OUTPATIENT)
Dept: PRIMARY CARE CLINIC | Age: 46
End: 2021-07-12
Payer: COMMERCIAL

## 2021-07-12 VITALS
SYSTOLIC BLOOD PRESSURE: 124 MMHG | DIASTOLIC BLOOD PRESSURE: 82 MMHG | WEIGHT: 138 LBS | HEART RATE: 100 BPM | BODY MASS INDEX: 22.27 KG/M2 | OXYGEN SATURATION: 98 %

## 2021-07-12 DIAGNOSIS — Z79.899 MEDICATION MANAGEMENT: Primary | ICD-10-CM

## 2021-07-12 DIAGNOSIS — J06.9 UPPER RESPIRATORY TRACT INFECTION, UNSPECIFIED TYPE: ICD-10-CM

## 2021-07-12 DIAGNOSIS — F41.9 ANXIETY: ICD-10-CM

## 2021-07-12 PROCEDURE — 4004F PT TOBACCO SCREEN RCVD TLK: CPT | Performed by: NURSE PRACTITIONER

## 2021-07-12 PROCEDURE — G8420 CALC BMI NORM PARAMETERS: HCPCS | Performed by: NURSE PRACTITIONER

## 2021-07-12 PROCEDURE — 99213 OFFICE O/P EST LOW 20 MIN: CPT | Performed by: NURSE PRACTITIONER

## 2021-07-12 PROCEDURE — G8427 DOCREV CUR MEDS BY ELIG CLIN: HCPCS | Performed by: NURSE PRACTITIONER

## 2021-07-12 RX ORDER — SOY ISOFLAV/BCOHOSH/CISSUS QUA 198 MG
CAPSULE ORAL
COMMUNITY

## 2021-07-12 RX ORDER — GUAIFENESIN 1200 MG/1
1200 TABLET, EXTENDED RELEASE ORAL 2 TIMES DAILY PRN
Qty: 30 TABLET | Refills: 0 | Status: SHIPPED | OUTPATIENT
Start: 2021-07-12 | End: 2021-11-24 | Stop reason: SDUPTHER

## 2021-07-12 RX ORDER — PRASTERONE (DHEA) 50 MG
CAPSULE ORAL
COMMUNITY
End: 2021-10-11

## 2021-07-12 SDOH — ECONOMIC STABILITY: FOOD INSECURITY: WITHIN THE PAST 12 MONTHS, YOU WORRIED THAT YOUR FOOD WOULD RUN OUT BEFORE YOU GOT MONEY TO BUY MORE.: NEVER TRUE

## 2021-07-12 SDOH — ECONOMIC STABILITY: FOOD INSECURITY: WITHIN THE PAST 12 MONTHS, THE FOOD YOU BOUGHT JUST DIDN'T LAST AND YOU DIDN'T HAVE MONEY TO GET MORE.: NEVER TRUE

## 2021-07-12 ASSESSMENT — SOCIAL DETERMINANTS OF HEALTH (SDOH): HOW HARD IS IT FOR YOU TO PAY FOR THE VERY BASICS LIKE FOOD, HOUSING, MEDICAL CARE, AND HEATING?: NOT HARD AT ALL

## 2021-07-12 ASSESSMENT — ENCOUNTER SYMPTOMS
BACK PAIN: 1
COUGH: 0
CONSTIPATION: 1
NAUSEA: 0
SHORTNESS OF BREATH: 0

## 2021-07-12 NOTE — PROGRESS NOTES
[address]  PRIMARY CARE  [facility]  [facility] [address] [place] [address]  Dept: [phone]    Cathy Salinas is a 48 y.o. female Established patient, who presents today for her medical conditions/complaints as noted below. Chief Complaint   Patient presents with    Anxiety       HPI:     HPI  She fell down stairs 6/10/21 and fractured left ankle. She is seeing Dr. Clare Avina. She states she is a little more mucous than normal.  She has walking boot on. She is taking Xanax 1/2 tablet 2x/day and 1 tablet at night. She also using trazodone also in evening. No marijuana X two years per patient.    She is drinking 2 Activa each morning    Reviewed prior notes Orthopedics  Reviewed previous Labs, Imaging and Hospital Records    No results found for: LDLCHOLESTEROL, LDLCALC    (goal LDL is <100)   AST (U/L)   Date Value   04/20/2021 16     ALT (U/L)   Date Value   04/20/2021 22     BUN (mg/dL)   Date Value   04/20/2021 18     BP Readings from Last 3 Encounters:   07/12/21 124/82   06/10/21 (!) 142/96   04/21/21 132/60          (goal 120/80)    Past Medical History:   Diagnosis Date    Anxiety     Endometriosis     Fibroid tumor 2017    Ovarian cyst     Wears dentures     uppers      Past Surgical History:   Procedure Laterality Date    BREAST SURGERY  2004    lumpectomy of the L     HYSTERECTOMY      cervix was taken    LAPAROSCOPIC APPENDECTOMY N/A 9/24/2020    APPENDECTOMY, LAPAROSCOPIC performed by Shaylee Best MD at [address] N/A 9/24/2020    OPERATIVE LAPAROSCOPY WITH RIGHT ADNEXECTOMY performed by Shireen Maldonado DO at [address], ureteral Bilateral 9/24/2020    URETERAL CATHETER INSERTION performed by Lia Mahajan MD at [address] EXTRACTION         Family History   Problem Relation Age of Onset    Colon Cancer Paternal Grandfather        Social History     Tobacco Use    Smoking status: Current Every Day Smoker     Packs/day: 0.75     Years: 20.00     Pack years: 15.00     Types: Cigarettes    Smokeless tobacco: Never Used   Substance Use Topics    Alcohol use: No     Comment: social      Current Outpatient Medications   Medication Sig Dispense Refill    Multiple Vitamins-Minerals (ONE-A-DAY MENOPAUSE FORMULA PO) Take by mouth      Rhubarb (ESTROVEN COMPLETE) 4 MG TABS Take by mouth      Ginger 500 MG CAPS Take by mouth Taking for dizziness - 1 in the morning      guaiFENesin (MUCINEX MAXIMUM STRENGTH) 1200 MG TB12 Take 1,200 mg by mouth 2 times daily as needed (congestion) 30 tablet 0    traZODone (DESYREL) 150 MG tablet Take 1 tablet by mouth nightly 30 tablet 0    ALPRAZolam (XANAX) 1 MG tablet Take 1 tablet by mouth 2 times daily as needed for Sleep or Anxiety for up to 30 days. 60 tablet 0    aspirin 325 MG EC tablet Take 1 tablet by mouth daily 42 tablet 0    diclofenac (VOLTAREN) 50 MG EC tablet Take 1 tablet by mouth nightly as needed for Pain 60 tablet 0    Elastic Bandages & Supports (MEDICAL COMPRESSION STOCKINGS) MISC 2 each by Does not apply route daily Right and left knee high compression stockings. 30-40 mmHg. To be worn continuously throughout the day. 4 each 3    ibuprofen (ADVIL;MOTRIN) 800 MG tablet Take 1 tablet by mouth 3 times daily (with meals) 90 tablet 1    butalbital-acetaminophen-caffeine (FIORICET, ESGIC) -40 MG per tablet Take 1 tablet by mouth every 6 hours as needed for Headaches 60 tablet 0    triamcinolone (KENALOG) 0.1 % ointment Apply topically 2 times daily 1 Tube 3    fluticasone (FLONASE) 50 MCG/ACT nasal spray 2 sprays by Each Nostril route daily 1 Bottle 0     No current facility-administered medications for this visit.      Allergies   Allergen Reactions    Diphenhydramine Itching    Sleep Aid [Diphenhydramine Hcl (Sleep)]        Health Maintenance   Topic Date Due    Hepatitis C screen  Never done    HIV screen  Never done    Lipid screen  Never done    Colon cancer screen colonoscopy  Never done    Flu vaccine (1) 09/01/2021    DTaP/Tdap/Td vaccine (2 - Td or Tdap) 10/09/2030    Pneumococcal 0-64 years Vaccine (2 of 2 - PPSV23) 03/27/2038    COVID-19 Vaccine  Completed    Hepatitis A vaccine  Aged Out    Hepatitis B vaccine  Aged Out    Hib vaccine  Aged Out    Meningococcal (ACWY) vaccine  Aged Out       Subjective:      Review of Systems   Constitutional: Positive for fatigue. Negative for chills and fever. HENT: Negative for congestion and ear pain. Respiratory: Negative for cough and shortness of breath. Gastrointestinal: Positive for constipation (Improved). Negative for nausea. Genitourinary: Negative for difficulty urinating and dysuria. Musculoskeletal: Positive for back pain (due to wearing boot.) and gait problem. Skin: Negative for rash and wound. Psychiatric/Behavioral: Positive for sleep disturbance. The patient is nervous/anxious. Objective:     /82   Pulse 100   Wt 138 lb (62.6 kg) Comment: pt wearing boot for ankle injury  LMP 02/26/2018   SpO2 98%   BMI 22.27 kg/m²   Physical Exam  Vitals and nursing note reviewed. Constitutional:       General: She is not in acute distress. Appearance: She is well-developed. She is not ill-appearing. HENT:      Head: Normocephalic and atraumatic. Right Ear: External ear normal.      Left Ear: External ear normal.   Eyes:      General: No scleral icterus. Right eye: No discharge. Left eye: No discharge. Conjunctiva/sclera: Conjunctivae normal.      Pupils: Pupils are equal, round, and reactive to light. Neck:      Thyroid: No thyromegaly. Trachea: No tracheal deviation. Cardiovascular:      Rate and Rhythm: Normal rate and regular rhythm. Heart sounds: Normal heart sounds. Pulmonary:      Effort: Pulmonary effort is normal. No respiratory distress. Breath sounds: Normal breath sounds. No wheezing.    Abdominal: General: Bowel sounds are normal.      Palpations: Abdomen is soft. Musculoskeletal:      Comments: Walking boot in place left ankle   Lymphadenopathy:      Cervical: No cervical adenopathy. Skin:     General: Skin is warm. Findings: No rash. Neurological:      Mental Status: She is alert and oriented to person, place, and time. Psychiatric:         Mood and Affect: Mood normal.         Behavior: Behavior normal.         Thought Content: Thought content normal.       Controlled substances monitoring: possible medication side effects, risk of tolerance and/or dependence, and alternative treatments discussed, no signs of potential drug abuse or diversion identified and OARRS report reviewed today- activity consistent with treatment plan. Assessment/Plan:   1. Medication management  2. Upper respiratory tract infection, unspecified type  -     guaiFENesin (MUCINEX MAXIMUM STRENGTH) 1200 MG TB12; Take 1,200 mg by mouth 2 times daily as needed (congestion), Disp-30 tablet, R-0Normal  3. Anxiety   -Continue alprazolam as needed  -Trazodone for sleep as needed    Return in about 3 months (around 10/12/2021) for med check. No orders of the defined types were placed in this encounter. Orders Placed This Encounter   Medications    guaiFENesin (MUCINEX MAXIMUM STRENGTH) 1200 MG TB12     Sig: Take 1,200 mg by mouth 2 times daily as needed (congestion)     Dispense:  30 tablet     Refill:  0       Patient given educational materials - see patient instructions. Discussed use, benefit, and side effects of prescribed medications. All patient questions answered. Pt voiced understanding. Reviewed health maintenance. Instructed to continue current medications, diet and exercise. Patient agreed with treatment plan. Follow up as directed.      Electronically signed by ANITA Isidro CNP on 7/12/2021 at 8:42 PM

## 2021-07-22 DIAGNOSIS — S82.65XD CLOSED NONDISPLACED FRACTURE OF LATERAL MALLEOLUS OF LEFT FIBULA WITH ROUTINE HEALING, SUBSEQUENT ENCOUNTER: ICD-10-CM

## 2021-07-22 DIAGNOSIS — M25.572 LEFT ANKLE PAIN, UNSPECIFIED CHRONICITY: Primary | ICD-10-CM

## 2021-07-22 DIAGNOSIS — Z79.899 MEDICATION MANAGEMENT: ICD-10-CM

## 2021-07-22 DIAGNOSIS — S82.65XD CLOSED NONDISPLACED FRACTURE OF LATERAL MALLEOLUS OF LEFT FIBULA WITH ROUTINE HEALING, SUBSEQUENT ENCOUNTER: Primary | ICD-10-CM

## 2021-07-22 DIAGNOSIS — F51.01 PRIMARY INSOMNIA: ICD-10-CM

## 2021-07-22 DIAGNOSIS — F41.9 ANXIETY: ICD-10-CM

## 2021-07-22 RX ORDER — ALPRAZOLAM 1 MG/1
1 TABLET ORAL 2 TIMES DAILY PRN
Qty: 60 TABLET | Refills: 0 | Status: SHIPPED | OUTPATIENT
Start: 2021-07-22 | End: 2021-08-19 | Stop reason: SDUPTHER

## 2021-07-22 RX ORDER — TRAZODONE HYDROCHLORIDE 150 MG/1
150 TABLET ORAL NIGHTLY
Qty: 30 TABLET | Refills: 0 | Status: SHIPPED | OUTPATIENT
Start: 2021-07-22 | End: 2021-08-19 | Stop reason: SDUPTHER

## 2021-08-02 ENCOUNTER — OFFICE VISIT (OUTPATIENT)
Dept: ORTHOPEDIC SURGERY | Age: 46
End: 2021-08-02
Payer: COMMERCIAL

## 2021-08-02 VITALS — HEIGHT: 66 IN | WEIGHT: 130 LBS | RESPIRATION RATE: 12 BRPM | BODY MASS INDEX: 20.89 KG/M2

## 2021-08-02 DIAGNOSIS — S82.65XD CLOSED NONDISPLACED FRACTURE OF LATERAL MALLEOLUS OF LEFT FIBULA WITH ROUTINE HEALING, SUBSEQUENT ENCOUNTER: Primary | ICD-10-CM

## 2021-08-02 PROCEDURE — 4004F PT TOBACCO SCREEN RCVD TLK: CPT | Performed by: ORTHOPAEDIC SURGERY

## 2021-08-02 PROCEDURE — G8427 DOCREV CUR MEDS BY ELIG CLIN: HCPCS | Performed by: ORTHOPAEDIC SURGERY

## 2021-08-02 PROCEDURE — 99213 OFFICE O/P EST LOW 20 MIN: CPT | Performed by: ORTHOPAEDIC SURGERY

## 2021-08-02 PROCEDURE — G8420 CALC BMI NORM PARAMETERS: HCPCS | Performed by: ORTHOPAEDIC SURGERY

## 2021-08-02 NOTE — PROGRESS NOTES
[facility]  [address] [facility] [place] [address]  Dept: [phone]    Ambulatory Orthopedic Consult      CHIEF COMPLAINT:    Chief Complaint   Patient presents with    Ankle Pain     left       HISTORY OF PRESENT ILLNESS:      The patient is a 48 y.o. female who is being seen for evaluation of the above, which began 6/10/2021 secondary to a fall downstairs  . At today's visit, she is using a brace/boot. History is obtained today from:   [x]  the patient     [x]  EMR     [x]  one family member/friend --her mother   []  multiple family members/friends    []  other:      INTERVAL HISTORY 7/2/2021:  She is seen again today in the office for follow up of a previous issue (as above). Since being seen last, the patient is doing about the same overall. At today's visit, she is using a removable brace. History is obtained today from:   [x]  the patient     []  EMR     []  one family member/friend    []  multiple family members/friends    []  other:      INTERVAL HISTORY 8/2/2021:  She is seen again today in the office for follow up of a previous issue (as above). Since being seen last, the patient is doing about the same overall. At today's visit, she is using a removable brace. History is obtained today from:   [x]  the patient     []  EMR     []  one family member/friend    []  multiple family members/friends    []  other:             REVIEW OF SYSTEMS:  Constitutional: Negative for fever. HENT: Negative for tinnitus. Eyes: Negative for pain. Respiratory: Negative for shortness of breath. Cardiovascular: Negative for chest pain. Gastrointestinal: Negative for abdominal pain. Genitourinary: Negative for dysuria. Skin: Negative for rash. Neurological: Negative for headaches. Hematological: Does not bruise/bleed easily.    Musculoskeletal: See HPI for pertinent positives     Past Medical History:    She  has a past medical history of Anxiety, Endometriosis, Fibroid tumor (2017), Ovarian cyst, and Wears dentures. Past Surgical History:    She  has a past surgical history that includes Breast surgery (2004); Hysterectomy; [place] tooth extraction; laparoscopy (N/A, 9/24/2020); pr catheter, ureteral (Bilateral, 9/24/2020); and laparoscopic appendectomy (N/A, 9/24/2020). Current Medications:     Current Outpatient Medications:     traZODone (DESYREL) 150 MG tablet, Take 1 tablet by mouth nightly, Disp: 30 tablet, Rfl: 0    ALPRAZolam (XANAX) 1 MG tablet, Take 1 tablet by mouth 2 times daily as needed for Sleep or Anxiety for up to 30 days. , Disp: 60 tablet, Rfl: 0    Multiple Vitamins-Minerals (ONE-A-DAY MENOPAUSE FORMULA PO), Take by mouth, Disp: , Rfl:     Rhubarb (ESTROVEN COMPLETE) 4 MG TABS, Take by mouth, Disp: , Rfl:     Ginger 500 MG CAPS, Take by mouth Taking for dizziness - 1 in the morning, Disp: , Rfl:     guaiFENesin (MUCINEX MAXIMUM STRENGTH) 1200 MG TB12, Take 1,200 mg by mouth 2 times daily as needed (congestion), Disp: 30 tablet, Rfl: 0    diclofenac (VOLTAREN) 50 MG EC tablet, Take 1 tablet by mouth nightly as needed for Pain, Disp: 60 tablet, Rfl: 0    Elastic Bandages & Supports ([address]) MISC, 2 each by Does not apply route daily Right and left knee high compression stockings. 30-40 mmHg.   To be worn continuously throughout the day., Disp: 4 each, Rfl: 3    ibuprofen (ADVIL;MOTRIN) 800 MG tablet, Take 1 tablet by mouth 3 times daily (with meals), Disp: 90 tablet, Rfl: 1    butalbital-acetaminophen-caffeine (FIORICET, ESGIC) -40 MG per tablet, Take 1 tablet by mouth every 6 hours as needed for Headaches, Disp: 60 tablet, Rfl: 0    triamcinolone (KENALOG) 0.1 % ointment, Apply topically 2 times daily, Disp: 1 Tube, Rfl: 3    fluticasone (FLONASE) 50 MCG/ACT nasal spray, 2 sprays by Each Nostril route daily, Disp: 1 Bottle, Rfl: 0    aspirin 325 MG EC tablet, Take 1 tablet by mouth daily, Disp: 42 tablet, Rfl: 0     Allergies:    Diphenhydramine and Sleep aid [diphenhydramine hcl (sleep)]    Family History:  family history includes Colon Cancer in her paternal grandfather. Social History:   Social History     Occupational History    Not on file   Tobacco Use    Smoking status: Current Every Day Smoker     Packs/day: 0.75     Years: 20.00     Pack years: 15.00     Types: Cigarettes    Smokeless tobacco: Never Used   Vaping Use    Vaping Use: Never used   Substance and Sexual Activity    Alcohol use: No     Comment: social    Drug use: Not Currently     Frequency: 7.0 times per week     Types: Marijuana    Sexual activity: Not on file     Occupation: Lost job due to [employer]     OBJECTIVE:  Resp 12   Ht 5' 6\" (1.676 m)   Wt 130 lb (59 kg)   LMP 02/26/2018   BMI 20.98 kg/m²    Psych: alert and oriented to person, time, and place   Cardio:  well perfused extremities, no cyanosis   Resp:  normal respiratory effort  Musculoskeletal:    Affected lower extremity:    Vascular: Limb well perfused, compartments soft/compressible. Skin: No erythema/ulcers. Intact. Neurovascular Status:  Grossly neurovascularly intact throughout  Motion:  Grossly able to fire major muscle groups with appropriate/expected AROM  Tenderness to Palpation: Distal fibula--mild  -Negative squeeze test for syndesmotic injury      RADIOLOGY:   8/2/2021 FINDINGS:  Three views (AP, Mortise, and Lateral) of the left ankle were obtained in the office today and reviewed, revealing none displaced Samaniego B distal fibula fracture. No widening of the medial clear space or syndesmosis. No interval displacement. Interval healing is noted. IMPRESSION:  Osseous injury as above.      Electronically signed by Lydia Nascimento MD        Relevant previous imaging reviewed, both imaging and report(s) as below:    XR KNEE LEFT (3 VIEWS)    Result Date: 6/10/2021  No acute osseous abnormality identified. XR ANKLE LEFT (MIN 3 VIEWS)    Result Date: 6/10/2021  1. Oblique nondisplaced fracture of the distal fibular shaft. Ankle mortise alignment appears maintained. 2.  No acute osseous abnormality identified in the foot. XR FOOT LEFT (MIN 3 VIEWS)    Result Date: 6/10/2021  1. Oblique nondisplaced fracture of the distal fibular shaft. Ankle mortise alignment appears maintained. 2.  No acute osseous abnormality identified in the foot. ASSESSMENT AND PLAN:  Body mass index is 20.98 kg/m². She has a left nondisplaced lateral malleolus fracture, sustained on 6/10/2021. She is doing well overall with conservative management; she did report a recent increase in pain secondary to \"overdoing it\", but reports that she was doing better prior to this. Notably, she has the past medical history as above. She has a history of tobacco use (reports that she smokes about 1 pack of cigarettes per day). We had a discussion today about the likely diagnosis and its natural history, physical exam and imaging findings, as well as various treatment options in detail. Surgically, I recommended continuing with conservative management, as her fracture is showing radiographic and clinical evidence of healing. We again discussed the risks of smoking in the context of fracture healing, and I did recommend tobacco cessation. Orders/referrals were placed as below at today's visit. She will continue to avoid the routine use of NSAIDs. She may wean out of the cam boot into a lace up ankle brace, while avoiding pain provoking activity. When she is fully out of the cam boot, she does not need any more aspirin for DVT prophylaxis. The patient was referred to physical therapy to work on gait training and general strengthening. All questions were answered and the above plan was agreed upon. The patient will return to clinic in 6 weeks with repeat left ankle x-rays.   At her next visit, I anticipate fully progressing her activity. At the patient's next visit, depending on how the patient is doing and/or new imaging/labs results, we may consider the following options:    []  Lace up ankle     []  CAM boot         []  removable wrist brace     []  PT:        []  Wean out immobilization         []  Adv activity      []  Footmind        []  Spenco       []  Custom Orthotic:               []  AZ brace                    []  Rocker Bottom      []  Night splint    []  Heel cups        []  Strap        []  Toe gizmos    []  Topl        []  NSAIDs         []  Bobo        []  Ref:         []  Stress Xray    []  CT        []  MRI  []  Inj:          []  Consider OR      []  Pick OR date    No follow-ups on file. No orders of the defined types were placed in this encounter. No orders of the defined types were placed in this encounter. Gabriela Gillis MD  Orthopedic Surgery        Please excuse any typos/errors, as this note was created with the assistance of voice recognition software. While intending to generate a document that actually reflects the content of the visit, the document can still have some errors including those of syntax and sound-a-like substitutions which may escape proof reading. In such instances, actual meaning can be extrapolated by context.

## 2021-08-03 ENCOUNTER — HOSPITAL ENCOUNTER (OUTPATIENT)
Dept: PHYSICAL THERAPY | Facility: CLINIC | Age: 46
Setting detail: THERAPIES SERIES
Discharge: HOME | End: 2021-08-03
Payer: COMMERCIAL

## 2021-08-03 PROCEDURE — 97161 PT EVAL LOW COMPLEX 20 MIN: CPT

## 2021-08-03 PROCEDURE — 97110 THERAPEUTIC EXERCISES: CPT

## 2021-08-03 NOTE — CONSULTS
[] [facility] &  Therapy  [address]  [phone]  F: [phone] [] [address]  [facility] [address]   [address]  P: [phone]  F: [phone] [x] [address] &  Therapy  [address]  P: [phone]  F: [phone] [] [address]  P: [phone]  F: [phone] [] [address]  [facility]   [address]   [place]: [phone]  F: [phone]      Physical Therapy Lower Extremity Evaluation    Date:  8/3/2021  Patient: Annie Gibson  : 1975  MRN: [id number]  Physician: Hemant Lima MD Insurance: [facility] ()  Medical Diagnosis: Closed Nondisplaced Fracture of left lateral malleolus   Rehab Codes: M25.57, M25.67, M25.372  Onset date: 6/10/2021  Next 's appt.: PRN    Subjective:   CC/HPI: Patient reports that she was fixing her daughter's car on 6/10/2021 when she stepped backwards and fell down two stairs. Patient reports she felt increased pain in her (L) ankle and went to the [facility] ED, where x-rays were taken showing a nondisplaced fracture of (L) distal fibula. Patient was given ankle brace and referred to Dr. Connor Cummings at that time. Patient saw Dr. Connor Cummings on 2021 and was placed in a CAM boot until 2021. Patient saw Dr. Connor Cummings on 2021, where she was placed in an soft ankle brace on and was given WBAT precautions. X-rays during this appointment show stable fx. Patient was referred to physical therapy at that time.      PMHx: [] Unremarkable [] Diabetes [] HTN  [] Pacemaker   [] MI/Heart Problems [] Cancer [] Arthritis [] Other:              [x] Refer to full medical chart  In EPIC       Comorbidities:   [] Obesity [] Dialysis  [] N/A   [] Asthma/COPD [] Dementia [] Other:   [] Stroke [] Sleep apnea [] Other:   [] Vascular disease [] Rheumatic disease [] Other:     Tests: [x] X-Ray: [] MRI:  [] Other:  RADIOLOGY:   8/2/2021 FINDINGS:  Three views (AP, Mortise, and Lateral) of the left ankle were obtained in the office today and reviewed, revealing none displaced Samaniego B distal fibula fracture. No widening of the medial clear space or syndesmosis. No interval displacement. Medications: [x] Refer to full medical record [] None [] Other:  Allergies:      [x] Refer to full medical record [] None [] Other:    Function:  Hand Dominance  [] Right  [] Left  Patient lives with: Staying with Father   In what type of home []  One story   [x] Two story   [] Split level   Number of stairs to enter 5 stairs    With handrail on the [x]  Right to enter   [x] Left to enter   Bathroom has a []  Tub only  [x] Tub/shower combo   [] Walk in shower    []  Grab bars   Washing machine is on [x]  Main level   [] Second level   [] Basement   Employer    Job Status []  Normal duty   [] Light duty   [] Off due to condition    []  Retired   [x] Not employed   [] Disability  [] Other:  []  Return to work:    Work activities/duties        ADL/IADL Previous level of function Current level of function Who currently assists the patient with task   Bathing  [x] Independent  [] Assist [x] Independent  [] Assist Sits downs when bathing    Dress/grooming [x] Independent  [] Assist [x] Independent  [] Assist Sits to put on pants    Transfer/mobility [x] Independent  [] Assist [x] Independent  [] Assist Ambulates with rollator when pain in ankle is increased   Feeding [x] Independent  [] Assist [x] Independent  [] Assist    Toileting [x] Independent  [] Assist [x] Independent  [] Assist    Driving [x] Independent  [] Assist [x] Independent  [] Assist    Housekeeping [x] Independent  [] Assist [x] Independent  [] Assist Unable to vacuum, puts laundry basket on rollator walker to get it to laundry room   Grocery shop/meal prep [x] Independent  [] Assist [x] Independent  [] Assist Uses scooter at grocery store     Gait Prior level of function Current level of function    [x] Independent  [] Assist [x] Independent  [] Assist   Device: [x] Independent [x] Independent    [] Straight Cane [] Quad cane [] Straight Cane [] Quad cane    [] Standard walker [] Rolling walker   [] 4 wheeled walker [] Standard walker [x] Rolling walker at times   [] 4 wheeled walker    [] Wheelchair [] Wheelchair     Pain:  [x] Yes  [] No Location: (L) ankle  Pain Rating: (0-10 scale) 3/10  Pain altered Tx:  [] Yes  [x] No  Action:    Symptoms:  [x] Improving [] Worsening [] Same  Better:  [] AM    [] PM    [] Sit    [] Rise/Sit    []Stand    [] Walk    [] Lying    [x] Other: Rest   Worse: [] AM    [] PM    [] Sit    [] Rise/Sit    [x]Stand    [x] Walk    [] Lying    [] Bend                      [] Valsalva    [] Other:  Sleep: [x] OK    [] Disturbed    Objective:    ROM  ° A/P STRENGTH    Left Right Left Right   Hip Flex [facility] 4+/5 4/5   Ext       ER WFL WFL 5/5 5/5   IR WFL WFL 5/5 5/5   ABD       ADD       Knee Flex [facility] 4+/5 4/5   Ext 5 deg from 0 2 deg from 0 4+/5 5/5   Ankle DF 20 deg from 0, 15 deg from 0 5 deg from 0/0 deg 3-/5 5/5   PF 45 deg/50 deg 80 deg/85 deg 3-/5 5/5   INV 10 deg 25 deg 3-/5 5/5   EVER 10 deg 25 deg 3-/5 5/5   Toe Ext 55 deg 20 deg 3-/5 5/5   Toe Flex 55 deg 15 deg 3-/5 5/5       OBSERVATION No Deficit Deficit Not Tested Comments   Posture       Iliac Crest [] [] [x]    PSIS [] [] [x]    ASIS [] [] [x]    Genu Valgus [x] [] []    Genu Varus [x] [] []    Genu Recurvatum [x] [] []    Palpation [] [x] [] TTP: (L) lat malleolus   Sensation [] [x] [] Increase sensitivity to light touch on (L) lateral fibula and lateral foot    Edema [] [] [x] Assess at next session   Neurological [x] [] []    Gait [] [x] [] Analysis: Decreased (R) step length, decreased (L) stance time, ambulates with (L) LE abducted         FUNCTION Normal Difficult Unable Sitting [x] [] []   Standing [] [x] []   Ambulation [] [x] []   Groom/Dress [] [x] []   Lift/Carry [] [] [x]   Stairs [] [x] []   Bending [x] [] []   Squat [] [] [x]   Kneel [] [] [x]           Functional Test: LEFS Score: 69% functionally impaired     Comments:    Assessment:  Patient is a 48year old female who presents to physical therapy post (L) distal fibular fracture. Patient demonstrates increased (L) ankle pain, decreased (L) ankle ROM, decreased (L) ankle strength and inconsistent gait. These impairments affect the patient's ability to perform ADLs and household related tasks without assistance from family. Patient would benefit from skilled physical therapy in order to improve these impairments and overall quality of life. Educated patient on findings and POC. Educated patient on WBAT status and showed patient correct way to utilize soft ankle brace. Educated patient extensively on activity modification, such as decreasing standing and walking time and taking seated rest breaks throughout the day, in order to aid healing of fracture site. Patient verbalized good understanding of all education, however states Gustabo Morales is tough and can handle pain. \" Education should be reviewed in future sessions. Problems:    [x] ? Pain:  [x] ? ROM:  [x] ? Strength:  [x] ? Function:  [] Other:       STG: (to be met in 12 treatments)  1. ? Pain: Patient will report pain as 0/10 in order to increase ease of performing ADLs  2. ? ROM:   a. Patient will increase (B) ankle DF to 0 deg in order to improve gait mechanics  b. Patient will improve toe extension to 55 deg in order to improve push off during gait. 3. ? Function: Patient will ambulate with consistent step length and even gait in order to reduce risk of falls   4. Patient to be independent with home exercise program as demonstrated by performance with correct form without cues.   5. Demonstrate Knowledge of fall prevention  LTG: (to be met in 6 treatments) 1. Patient will improve LEFS 34/80 in order to improve overall quality of life. 2. ? Strength: Patient will improve (L) ankle strength to 5/5 in order to increase stability within (L) ankle            3. Patient will report being able to vacuum home without pain in (L) ankle         . Patient goals: \"Get my ankle better\"    Rehab Potential:  [x] Good  [] Fair  [] Poor   Suggested Professional Referral:  [x] No  [] Yes:  Barriers to Goal Achievement:  [x] No  [] Yes:  Domestic Concerns:  [x] No  [] Yes:    Pt. Education:  [x] Plans/Goals, Risks/Benefits discussed  [x] Home exercise program    Method of Education: [x] Verbal  [x] Demo  [x] Written  Comprehension of Education:  [x] Verbalizes understanding. [x] Demonstrates understanding. [x] Needs Review. [] Demonstrates/verbalizes understanding of HEP/Ed previously given. Treatment Plan:  [x] Therapeutic Exercise   23191  [] Iontophoresis: 4 mg/mL Dexamethasone Sodium Phosphate  mAmin  40574   [x] Therapeutic Activity  09135 [x] Vasopneumatic cold with compression  67580    [x] Gait Training   31979 [] Ultrasound   99833   [x] Neuromuscular Re-education  98703 [] Electrical Stimulation Unattended  22385   [x] Manual Therapy  76946 [] Electrical Stimulation Attended  54165   [x] Instruction in HEP  [] Lumbar/Cervical Traction  27836   [] Aquatic Therapy   29972 [x] Cold/hotpack    [] Massage   84673      [] Dry Needling, 1 or 2 muscles  35344   [] Biofeedback, first 15 minutes   11592  [] Biofeedback, additional 15 minutes   48437 [] Dry Needling, 3 or more muscles  87426     [x]  Medication allergies reviewed for use of    Dexamethasone Sodium Phosphate 4mg/ml     with iontophoresis treatments. Pt is not allergic.     Frequency:  2 x/week for 16 visits        Todays Treatment:  Modalities:   Precautions: WBAT  Exercises:  Exercise Reps/ Time Weight/ Level Comments   Supine Gastroc Strap S 30\"x3     Hamstring S DF/PF AROM x10     Inv/Ev AROM x10     Toe ext  x10     Ankle ABCs            Other:    Specific Instructions for next treatment:      Evaluation Complexity:  History (Personal factors, comorbidities) [x] 0 [] 1-2 [] 3+   Exam (limitations, restrictions) [x] 1-2 [] 3 [] 4+   Clinical presentation (progression) [x] Stable [] Evolving  [] Unstable   Decision Making [x] Low [] Moderate [] High    [x] Low Complexity [] Moderate Complexity [] High Complexity       Treatment Charges: Mins Units   [x] Evaluation       [x]  Low       []  Moderate       []  High 35 1   []  Modalities     [x]  Ther Exercise 15 1   []  Manual Therapy     []  Ther Activities     []  Aquatics     []  Vasocompression     []  Other       TOTAL TREATMENT TIME: 50    Time in: 4:05 pm   Time Out: 5:05    Electronically signed by: Avelina Colunga PT        Physician Signature:________________________________Date:__________________  By signing above or cosigning this note, I have reviewed this plan of care and certify a need for medically necessary rehabilitation services.      *PLEASE SIGN ABOVE AND FAX BACK ALL PAGES*

## 2021-08-05 ENCOUNTER — HOSPITAL ENCOUNTER (OUTPATIENT)
Dept: PHYSICAL THERAPY | Facility: CLINIC | Age: 46
Setting detail: THERAPIES SERIES
Discharge: HOME | End: 2021-08-05
Payer: COMMERCIAL

## 2021-08-05 PROCEDURE — 97110 THERAPEUTIC EXERCISES: CPT

## 2021-08-05 NOTE — FLOWSHEET NOTE
[] [facility]) [facility] &  Therapy  [address]  [phone]  F: [phone] [] [address]  [address]   [address]  P: [phone]  F: [phone] [x] [address] &  Therapy  [address] [facility]  P: [phone]  F: [phone] [] [address]  P: [phone]  F: [phone] [] [address]  [facility]   [address]   [place]: [phone]  F: [phone]      Physical Therapy Daily Treatment Note    Date:  2021  Patient Name:  Clayton Ayala    :  1975  MRN: [id number]  Physician: Taylor Red MD     Insurance: [address] [place] [address] ( visits approved) Pre-Auth after eval   Medical Diagnosis: Closed Nondisplaced Fracture of left lateral malleolus   Rehab Codes: M25.57, M25.67, M25.372  Onset date: 6/10/2021                       Next Dr's appt.: PRN   Visit# / total visits:      Cancels/No Shows: 0/0    Subjective:    Pain:  [x] Yes  [] No Location: (L) ankle Pain Rating: (0-10 scale) 2-3/10  Pain altered Tx:  [x] No  [] Yes  Action:  Comments: Patient reports increased pain in (L) ankle after last session that has since subsided. Patient reports that she has been able to complete HEP at home with no pain.      Objective:  Modalities:   Precautions: WBAT  Exercises:  Exercise Reps/ Time Weight/ Level Comments   Nustep 8'           SB S 30\"x3       Hamstring S  30\"x3                 DF/PF AROM 2x10       Inv/Ev AROM 2x10       Toe ext  2x10       Ankle ABCs x1       BAPS   x20ea  L2           Seated HR/DF x20     Weight Shifts (L) x10  Walker    TG HR x10 L16    Other:      Treatment Charges: Mins Units   []  Modalities     [x]  Ther Exercise 43 3   []  Manual Therapy     []  Ther Activities     []  Aquatics     [] Vasocompression     []  Other     Total Treatment time 43 3       Assessment: [x] Progressing toward goals. Initiated exercise program per flowsheet listed above. Began session on Nustep to increase blood flow and warm up muscles. Followed with stretching to gastroc and HS to increase mobility within (B) LE. Initiated mobility and AROM ex during this session in order to increase in (L) ankle ROM and improve gait mechanics. Initiated weight shifts onto (L) foot as tolerated in order to begin increasing WB tolerance within (L) foot. Ended session with seated and TG heel raises in order to increase strength within (L) gastroc. Patient declined vasocompression at end of session. Advised patient to utilize ice at home if (L) ankle becomes sore or swollen after treatment. Patient reports no increase in pain after treatment. [] No change. [] Other:  [x] Patient would continue to benefit from skilled physical therapy services in order to improve toward     STG: (to be met in 12 treatments)  1. ? Pain: Patient will report pain as 0/10 in order to increase ease of performing ADLs  2. ? ROM:   a. Patient will increase (B) ankle DF to 0 deg in order to improve gait mechanics  b. Patient will improve toe extension to 55 deg in order to improve push off during gait. 3. ? Function: Patient will ambulate with consistent step length and even gait in order to reduce risk of falls   4. Patient to be independent with home exercise program as demonstrated by performance with correct form without cues. 5. Demonstrate Knowledge of fall prevention  LTG: (to be met in 6 treatments)          1. Patient will improve LEFS 34/80 in order to improve overall quality of life. 2. ? Strength: Patient will improve (L) ankle strength to 5/5 in order to increase stability within (L) ankle            3. Patient will report being able to vacuum home without pain in (L) ankle         . Pt.  Education:  [x] Yes  [] No  [x] Reviewed Prior HEP/Ed  Method of Education: [x] Verbal  [x] Demo  [x] Written  Comprehension of Education:  [x] Verbalizes understanding. [x] Demonstrates understanding. [x] Needs review. [] Demonstrates/verbalizes HEP/Ed previously given. Plan: [x] Continue current frequency toward long and short term goals.     [x] Specific Instructions for subsequent treatments: Continue to progress (L) ankle mobility and strength      Time In: 2:00 pm            Time Out: 2:45 pm    Electronically signed by:  Hernesto Bermudez PT

## 2021-08-09 ENCOUNTER — HOSPITAL ENCOUNTER (OUTPATIENT)
Dept: PHYSICAL THERAPY | Facility: CLINIC | Age: 46
Setting detail: THERAPIES SERIES
Discharge: HOME | End: 2021-08-09
Payer: COMMERCIAL

## 2021-08-09 PROCEDURE — 97110 THERAPEUTIC EXERCISES: CPT

## 2021-08-09 PROCEDURE — 97016 VASOPNEUMATIC DEVICE THERAPY: CPT

## 2021-08-09 NOTE — FLOWSHEET NOTE
Activities     []  Aquatics     [x]  Vasocompression 15 1   []  Other     Total Treatment time 50 3       Assessment: [x] Progressing toward goals. Pt began session with a warm up on the Airdyne to help increase bloodflow to warm up tight mm. Able to perform all standing stretches and exercises with no verbal c/o of pain. Added in multiple directional mini lunges in CKC to help actively move joint through ranges of motion. Able to use vaso at the end of the session to aide in reduction of swelling. [] No change. [] Other:  [x] Patient would continue to benefit from skilled physical therapy services in order to improve toward     STG: (to be met in 12 treatments)  1. ? Pain: Patient will report pain as 0/10 in order to increase ease of performing ADLs  2. ? ROM:   a. Patient will increase (B) ankle DF to 0 deg in order to improve gait mechanics  b. Patient will improve toe extension to 55 deg in order to improve push off during gait. 3. ? Function: Patient will ambulate with consistent step length and even gait in order to reduce risk of falls   4. Patient to be independent with home exercise program as demonstrated by performance with correct form without cues. 5. Demonstrate Knowledge of fall prevention  LTG: (to be met in 6 treatments)          1. Patient will improve LEFS 34/80 in order to improve overall quality of life. 2. ? Strength: Patient will improve (L) ankle strength to 5/5 in order to increase stability within (L) ankle            3. Patient will report being able to vacuum home without pain in (L) ankle         . Pt. Education:  [x] Yes  [] No  [x] Reviewed Prior HEP/Ed  Method of Education: [x] Verbal  [x] Demo  [x] Written  Comprehension of Education:  [x] Verbalizes understanding. [x] Demonstrates understanding. [x] Needs review. [] Demonstrates/verbalizes HEP/Ed previously given. Plan: [x] Continue current frequency toward long and short term goals.     [x] Specific Instructions for subsequent treatments: Continue to progress (L) ankle mobility and strength      Time In: 10:01am            Time Out:  11:05am    Electronically signed by:  Brenda Jain PTA

## 2021-08-11 ENCOUNTER — HOSPITAL ENCOUNTER (OUTPATIENT)
Dept: PHYSICAL THERAPY | Facility: CLINIC | Age: 46
Setting detail: THERAPIES SERIES
End: 2021-08-11
Payer: COMMERCIAL

## 2021-08-16 ENCOUNTER — HOSPITAL ENCOUNTER (OUTPATIENT)
Dept: PHYSICAL THERAPY | Facility: CLINIC | Age: 46
Setting detail: THERAPIES SERIES
Discharge: HOME | End: 2021-08-16
Payer: COMMERCIAL

## 2021-08-16 PROCEDURE — 97016 VASOPNEUMATIC DEVICE THERAPY: CPT

## 2021-08-16 PROCEDURE — 97110 THERAPEUTIC EXERCISES: CPT

## 2021-08-16 NOTE — FLOWSHEET NOTE
[] [facility] &  Therapy  [address]  [phone]  F: [phone] [] [address]  [facility]   [address]  P: [phone]  F: [phone] [x] [address]  [facility] &  Therapy  [address] [facility]  P: [phone]  F: [phone]     Physical Therapy Daily Treatment Note    Date:  2021  Patient Name:  Jeanette Stephenson    :  1975  MRN: [id number]  Physician: Marcy Arriaga MD     Insurance: [address] [place] [address] ( visits approved) Pre-Auth after eval   Medical Diagnosis: Closed Nondisplaced Fracture of left lateral malleolus   Rehab Codes: M25.57, M25.67, M25.372  Onset date: 6/10/2021                       Next 's appt.: PRN   Visit# / total visits:      Cancels/No Shows: 0/0    Subjective:    Pain:  [x] Yes  [] No Location: (L) ankle Pain Rating: (0-10 scale) --/10  Pain altered Tx:  [x] No  [] Yes  Action:  Comments: Pt mentioning that she is good and does not discuss pain levels. Objective:  Modalities:   Precautions: WBAT  Exercises:  Exercise Reps/ Time Weight/ Level Comments    Elias 10'   x          SB S 30\"x3   Gastroc and Soleus  x   Hamstring S 30\"x3     x              BAPS   x30ea  L2 F/B, S/S, CW, CCW x          Eccentric HR  x20   x   Balance SL (L) 90\" hold One UE // bars x   TG: Squats/HR x20 L16  x          Step up  30x 6\"  x   BOSU lunges 30x   x           Other:      Treatment Charges: Mins Units   []  Modalities     [x]  Ther Exercise 35 2   []  Manual Therapy     []  Ther Activities     []  Aquatics     [x]  Vasocompression 15 1   []  Other     Total Treatment time 50 3       Assessment: [x] Progressing toward goals. Continued with warm up and stretches at the beginning of session. Able to add in new exercises that focus on CKC strengthening and working on increasing stability.  Verbal cueing for proper foot placement and neutral stance d/t pt wishing to place L foot in ER. Finished with vaso. [] No change. [] Other:  [x] Patient would continue to benefit from skilled physical therapy services in order to improve toward     STG: (to be met in 8 treatments)  1. ? Pain: Patient will report pain as 0/10 in order to increase ease of performing ADLs  2. ? ROM:   a. Patient will increase (B) ankle DF to 0 deg in order to improve gait mechanics  b. Patient will improve toe extension to 55 deg in order to improve push off during gait. 3. ? Function: Patient will ambulate with consistent step length and even gait in order to reduce risk of falls   4. Patient to be independent with home exercise program as demonstrated by performance with correct form without cues. 5. Demonstrate Knowledge of fall prevention  LTG: (to be met in 16 treatments)          1. Patient will improve LEFS 34/80 in order to improve overall quality of life. 2. ? Strength: Patient will improve (L) ankle strength to 5/5 in order to increase stability within (L) ankle            3. Patient will report being able to vacuum home without pain in (L) ankle         . Pt. Education:  [x] Yes  [] No  [x] Reviewed Prior HEP/Ed  Method of Education: [x] Verbal  [x] Demo  [x] Written  Comprehension of Education:  [x] Verbalizes understanding. [x] Demonstrates understanding. [x] Needs review. [] Demonstrates/verbalizes HEP/Ed previously given. Plan: [x] Continue current frequency toward long and short term goals.     [x] Specific Instructions for subsequent treatments: Continue to progress (L) ankle mobility and strength      Time In: 10:01am            Time Out:  11:05am    Electronically signed by:  Luis Gusman PTA

## 2021-08-18 ENCOUNTER — HOSPITAL ENCOUNTER (OUTPATIENT)
Dept: PHYSICAL THERAPY | Facility: CLINIC | Age: 46
Setting detail: THERAPIES SERIES
Discharge: HOME | End: 2021-08-18
Payer: COMMERCIAL

## 2021-08-18 PROCEDURE — 97110 THERAPEUTIC EXERCISES: CPT

## 2021-08-18 PROCEDURE — 97016 VASOPNEUMATIC DEVICE THERAPY: CPT

## 2021-08-18 NOTE — FLOWSHEET NOTE
[] [facility] &  Therapy  [address]  [phone]  F: [phone] [] [address]  [facility]   [address]  P: [phone]  F: [phone] [x] [address]  Outpatient Rehabilitation &  Therapy  [address] [facility]  P: [phone]  F: [phone]     Physical Therapy Daily Treatment Note    Date:  2021  Patient Name:  Josiane Edwards    :  1975  MRN: [id number]  Physician: Jose Sandoval MD     Insurance: [address] [place] [address] ( visits approved) Pre-Auth after eval   Medical Diagnosis: Closed Nondisplaced Fracture of left lateral malleolus   Rehab Codes: M25.57, M25.67, M25.372  Onset date: 6/10/2021                       Next 's appt.: PRN   Visit# / total visits:      Cancels/No Shows: 0/0    Subjective:    Pain:  [x] Yes  [] No Location: (L) ankle Pain Rating: (0-10 scale) --/10  Pain altered Tx:  [x] No  [] Yes  Action:  Comments: Pt mentioning that her Wednesday is starting off wonderful and that she saw a rainbow on the way to PT. Modalities:   Precautions: WBAT  Exercises:  Exercise Reps/ Time Weight/ Level Comments    Elias 10'   x          SB S 30\"x3   Gastroc and Soleus  x   Hamstring S 30\"x3     x              BAPS   x30ea  L2 F/B, S/S, CW, CCW x          Eccentric HR  x30   x   Balance SL (L) 90\" hold One UE // bars x   TG: Squats/HR x20 L17  x   TG: Squats SL x20 L10  NEXT   Step up  20x 8\" 20 with L, 20 with R lead x   BOSU lunges 30x   x   Balance board  3m L2  x   Other:      Treatment Charges: Mins Units   []  Modalities     [x]  Ther Exercise 35 2   []  Manual Therapy     []  Ther Activities     []  Aquatics     [x]  Vasocompression 15 1   []  Other     Total Treatment time 50 3       Assessment: [x] Progressing toward goals. Increased height of TG to further challenge strengthening and ankle motion.  Pt continued with single leg stance balance and utilizes one UE support. Added in balance board into program to further enhance pt balance. Finished with vaso at end of session on high setting per pt request.      [] No change. [] Other:  [x] Patient would continue to benefit from skilled physical therapy services in order to improve toward     STG: (to be met in 8 treatments)  1. ? Pain: Patient will report pain as 0/10 in order to increase ease of performing ADLs  2. ? ROM:   a. Patient will increase (B) ankle DF to 0 deg in order to improve gait mechanics  b. Patient will improve toe extension to 55 deg in order to improve push off during gait. 3. ? Function: Patient will ambulate with consistent step length and even gait in order to reduce risk of falls   4. Patient to be independent with home exercise program as demonstrated by performance with correct form without cues. 5. Demonstrate Knowledge of fall prevention  LTG: (to be met in 16 treatments)          1. Patient will improve LEFS 34/80 in order to improve overall quality of life. 2. ? Strength: Patient will improve (L) ankle strength to 5/5 in order to increase stability within (L) ankle            3. Patient will report being able to vacuum home without pain in (L) ankle         . Pt. Education:  [x] Yes  [] No  [x] Reviewed Prior HEP/Ed  Method of Education: [x] Verbal  [x] Demo  [x] Written  Comprehension of Education:  [x] Verbalizes understanding. [x] Demonstrates understanding. [x] Needs review. [] Demonstrates/verbalizes HEP/Ed previously given. Plan: [x] Continue current frequency toward long and short term goals.     [x] Specific Instructions for subsequent treatments: Continue to progress (L) ankle mobility and strength      Time In: 8:55am            Time Out:  9:50am    Electronically signed by:  Reshma Dumont PTA

## 2021-08-19 DIAGNOSIS — F41.9 ANXIETY: ICD-10-CM

## 2021-08-19 DIAGNOSIS — Z79.899 MEDICATION MANAGEMENT: ICD-10-CM

## 2021-08-19 DIAGNOSIS — F51.01 PRIMARY INSOMNIA: ICD-10-CM

## 2021-08-19 RX ORDER — TRAZODONE HYDROCHLORIDE 150 MG/1
150 TABLET ORAL NIGHTLY
Qty: 30 TABLET | Refills: 0 | Status: SHIPPED | OUTPATIENT
Start: 2021-08-19 | End: 2021-09-16 | Stop reason: SDUPTHER

## 2021-08-19 RX ORDER — FLUTICASONE PROPIONATE 50 MCG
2 SPRAY, SUSPENSION (ML) NASAL DAILY
Qty: 1 BOTTLE | Refills: 0 | Status: SHIPPED | OUTPATIENT
Start: 2021-08-19 | End: 2021-11-24 | Stop reason: SDUPTHER

## 2021-08-19 RX ORDER — ALPRAZOLAM 1 MG/1
1 TABLET ORAL 2 TIMES DAILY PRN
Qty: 60 TABLET | Refills: 0 | Status: SHIPPED | OUTPATIENT
Start: 2021-08-19 | End: 2021-09-16 | Stop reason: SDUPTHER

## 2021-08-23 ENCOUNTER — HOSPITAL ENCOUNTER (OUTPATIENT)
Dept: PHYSICAL THERAPY | Facility: CLINIC | Age: 46
Setting detail: THERAPIES SERIES
Discharge: HOME | End: 2021-08-23
Payer: COMMERCIAL

## 2021-08-23 PROCEDURE — 97110 THERAPEUTIC EXERCISES: CPT

## 2021-08-23 PROCEDURE — 97016 VASOPNEUMATIC DEVICE THERAPY: CPT

## 2021-08-23 NOTE — FLOWSHEET NOTE
[] [facility] &  Therapy  [address]  [phone]  F: [phone] [] [address]  [facility]   [address]  P: [phone]  F: [phone] [x] [address]  Outpatient Rehabilitation &  Therapy  [address]  P: [phone]  F: [phone]     Physical Therapy Daily Treatment Note    Date:  2021  Patient Name:  Mike Christie    :  1975  MRN: [id number]  Physician: Ry Naranjo MD     Insurance: [facility] ( visits approved) Pre-Auth after eval   Medical Diagnosis: Closed Nondisplaced Fracture of left lateral malleolus   Rehab Codes: M25.57, M25.67, M25.372  Onset date: 6/10/2021                       Next 's appt.: PRN   Visit# / total visits:      Cancels/No Shows: 0/0    Subjective:    Pain:  [x] Yes  [] No Location: (L) ankle Pain Rating: (0-10 scale) --/10  Pain altered Tx:  [x] No  [] Yes  Action:  Comments: Pt stated that she was sore today from helping her daughter move this weekend. Modalities:   Precautions: WBAT  Exercises:  Exercise Reps/ Time Weight/ Level Comments    Elias 10'   x          SB S 30\"x3   Gastroc and Soleus  x   Hamstring S 30\"x3     x              BAPS   x30ea  L2 F/B, S/S, CW, CCW x          Eccentric HR  x30  Back of treadmill x   Balance SL (L) 90\" hold One UE // bars x   TG: Squats/HR x30 L18  x   TG: Squats SL x20 L10  x   Step up  20x 8\" 20 with L, 20 with R lead x   Lateral step ups 20x 8\"  x   BOSU lunges 30x  Forward and lateral x   Balance board  3m L2  x   Other:      Treatment Charges: Mins Units   []  Modalities     [x]  Ther Exercise 40 3   []  Manual Therapy     []  Ther Activities     []  Aquatics     [x]  Vasocompression 15 1   []  Other     Total Treatment time 55 4       Assessment: [x] Progressing toward goals.    Progressed pt program today with lateral movements with steps and BOSU. Able to add ing SL squatting on the TG. Increased height level with the eccentric calf raises to strengthen in a deeper ROM. Pt with notable fatigue in her walking. Finished with vaso. [] No change. [] Other:  [x] Patient would continue to benefit from skilled physical therapy services in order to improve toward     STG: (to be met in 8 treatments)  1. ? Pain: Patient will report pain as 0/10 in order to increase ease of performing ADLs  2. ? ROM:   a. Patient will increase (B) ankle DF to 0 deg in order to improve gait mechanics  b. Patient will improve toe extension to 55 deg in order to improve push off during gait. 3. ? Function: Patient will ambulate with consistent step length and even gait in order to reduce risk of falls   4. Patient to be independent with home exercise program as demonstrated by performance with correct form without cues. 5. Demonstrate Knowledge of fall prevention  LTG: (to be met in 16 treatments)          1. Patient will improve LEFS 34/80 in order to improve overall quality of life. 2. ? Strength: Patient will improve (L) ankle strength to 5/5 in order to increase stability within (L) ankle            3. Patient will report being able to vacuum home without pain in (L) ankle         . Pt. Education:  [x] Yes  [] No  [x] Reviewed Prior HEP/Ed  Method of Education: [x] Verbal  [x] Demo  [x] Written  Comprehension of Education:  [x] Verbalizes understanding. [x] Demonstrates understanding. [x] Needs review. [] Demonstrates/verbalizes HEP/Ed previously given. Plan: [x] Continue current frequency toward long and short term goals.     [x] Specific Instructions for subsequent treatments: Continue to progress (L) ankle mobility and strength      Time In: 8:00am            Time Out:  9:03am    Electronically signed by:  Elizabeth Huff PTA

## 2021-08-25 ENCOUNTER — HOSPITAL ENCOUNTER (OUTPATIENT)
Dept: PHYSICAL THERAPY | Facility: CLINIC | Age: 46
Setting detail: THERAPIES SERIES
Discharge: HOME | End: 2021-08-25
Payer: COMMERCIAL

## 2021-08-25 PROCEDURE — 97110 THERAPEUTIC EXERCISES: CPT

## 2021-08-25 PROCEDURE — 97530 THERAPEUTIC ACTIVITIES: CPT

## 2021-08-25 PROCEDURE — 97016 VASOPNEUMATIC DEVICE THERAPY: CPT

## 2021-08-25 NOTE — FLOWSHEET NOTE
[] [facility] &  Therapy  [address]  [phone]  F: [phone] [] [address]  [facility] 36   [address]  P: [phone]  F: [phone] [x] [address]  Outpatient Rehabilitation &  Therapy  [address]  P: [phone]  F: [phone]     Physical Therapy Daily Treatment Note/Progress Note    Date:  2021  Patient Name:  Josiane Edwards    :  1975  MRN: [id number]  Physician: Jose Sandoval MD     Insurance: [facility] ( visits approved) Pre-Auth after eval   Medical Diagnosis: Closed Nondisplaced Fracture of left lateral malleolus   Rehab Codes: M25.57, M25.67, M25.372  Onset date: 6/10/2021                       Next 's appt.: PRN   Visit# / total visits:      Cancels/No Shows: 0/0    Subjective:    Pain:  [] Yes  [x] No Location: (L) ankle Pain Rating: (0-10 scale) 0/10  Pain altered Tx:  [x] No  [] Yes  Action:  Comments: Pt states that she feels her (L) ankle is doing much better. Reports that she is able to walk in the grocery store for longer periods of time and no longer uses a motorized scooter.  Patient reports that she continues to be cautious with stairs and stepping onto (L) foot in order to \"let her ankle heal.\"  Modalities:   Precautions: WBAT  Exercises:  Exercise Reps/ Time Weight/ Level Comments    Elias 6'   x          SB S 30\"x3   Gastroc and Soleus  x   Hamstring S 30\"x3     x              BAPS   x30ea  L2 F/B, S/S, CW, CCW           Eccentric HR  x30  Back of treadmill x   Balance SL (L) 90\" hold One UE // bars x   TG: Squats/HR x30 L18  x   TG: Squats SL x20 L10  x   Step up  20x 8\" 20 with L, 20 with R lead x   Lateral step ups 20x 8\"  x   BOSU lunges 30x  Forward and lateral x   Balance board  3m L2  x   Other:      Treatment Charges: Mins Units   []  Modalities     [x]  Ther Exercise 36 2   []  Manual Therapy [x]  Ther Activities 10 1   []  Aquatics     [x]  Vasocompression 10 1   []  Other     Total Treatment time 56 4       Assessment: [x] Progressing toward goals. Reassessed patient's goals today with good progress. Patient met 4/6 short term goals at this time. Patient demonstrates improvements in pain tolerance, (L) ankle ROM, (L) first toe ROM and gait mechanics. Patient would continue to benefit from physical therapy in order to continue to improve (L) ankle and foot ROM, proprioception within (L) ankle, gait mechanics and (L) ankle strength. Plan to continue with current POC of (L) ankle ROM, strengthening and balance. Began session on bike in order to warm up muscles and improve (L) ankle ROM. Followed with stretching to (B) LE in order to improve mobility. Continued with current (L) ankle strengthening and mobility ex per flowsheet above. Ended session with vaso to decrease post-exercise soreness. Patient reports no increased pain at end of session. [] No change. [] Other:  [x] Patient would continue to benefit from skilled physical therapy services in order to improve toward     STG: (to be met in 8 treatments)  1. ? Pain: Patient will report pain as 0/10 in order to increase ease of performing ADLs (8/25/21: Patient reports 0/10 pain, however states there is a slight ache in the ankle) (MET)  2. ? ROM:   a. Patient will increase (B) ankle DF to 0 deg in order to improve gait mechanics (8/25/21: Patient demonstrates 10 deg from neutral of DF within (L) ankle) (NOT MET)   b. Patient will improve toe extension to 55 deg in order to improve push off during gait. (8/25/21: Patient demonstrates 45 deg of (L) first toe extension) (NOT MET)   3. ? Function: Patient will ambulate with consistent step length and even gait in order to reduce risk of falls (8/25/21: Patient demonstrates improve step length and consistent reciprocal gait pattern.  Continues to demonstrate decreased stance time on (L) ankle and WBOS when fatigued. (MET)  4. Patient to be independent with home exercise program as demonstrated by performance with correct form without cues. (8/25/21: Patient reports completing HEP at least 5x/day) (MET)  5. Demonstrate Knowledge of fall prevention (MET)  LTG: (to be met in 16 treatments)          1. Patient will improve LEFS 34/80 in order to improve overall quality of life. 2. ? Strength: Patient will improve (L) ankle strength to 5/5 in order to increase stability within (L) ankle            3. Patient will report being able to vacuum home without pain in (L) ankle         . Pt. Education:  [x] Yes  [] No  [x] Reviewed Prior HEP/Ed  Method of Education: [x] Verbal  [x] Demo  [x] Written  Comprehension of Education:  [x] Verbalizes understanding. [x] Demonstrates understanding. [x] Needs review. [] Demonstrates/verbalizes HEP/Ed previously given. Plan: [x] Continue current frequency toward long and short term goals. [x] Specific Instructions for subsequent treatments: Continue to progress (L) ankle mobility and strength      Time In: 8:00am            Time Out:  9:00 am    Patient Status:     [x] Continue per initial plan of care. [x] Additional visits necessary in order to continue to improve (L) ankle ROM and strength. [] Other:     Requested Frequency/Duration: 2 times per week for 10 treatments. Electronically signed by Karina Macedo PT on 8/30/2021 at 11:26 AM      If you have any questions or concerns, please don't hesitate to call. Thank you for your referral.    Physician Signature:________________________________Date:__________________  By signing above or cosigning this note, I have reviewed this plan of care and certify a need for medically necessary rehabilitation services.      *PLEASE SIGN ABOVE AND FAX BACK ALL PAGES*

## 2021-08-27 ENCOUNTER — OFFICE VISIT (OUTPATIENT)
Dept: PRIMARY CARE CLINIC | Age: 46
End: 2021-08-27
Payer: COMMERCIAL

## 2021-08-27 VITALS
HEART RATE: 84 BPM | OXYGEN SATURATION: 98 % | DIASTOLIC BLOOD PRESSURE: 80 MMHG | BODY MASS INDEX: 22.18 KG/M2 | WEIGHT: 138 LBS | HEIGHT: 66 IN | SYSTOLIC BLOOD PRESSURE: 132 MMHG

## 2021-08-27 DIAGNOSIS — M54.32 LEFT SIDED SCIATICA: Primary | ICD-10-CM

## 2021-08-27 PROCEDURE — 99213 OFFICE O/P EST LOW 20 MIN: CPT | Performed by: PHYSICIAN ASSISTANT

## 2021-08-27 PROCEDURE — 4004F PT TOBACCO SCREEN RCVD TLK: CPT | Performed by: PHYSICIAN ASSISTANT

## 2021-08-27 PROCEDURE — G8427 DOCREV CUR MEDS BY ELIG CLIN: HCPCS | Performed by: PHYSICIAN ASSISTANT

## 2021-08-27 PROCEDURE — G8420 CALC BMI NORM PARAMETERS: HCPCS | Performed by: PHYSICIAN ASSISTANT

## 2021-08-27 RX ORDER — CYCLOBENZAPRINE HCL 5 MG
5 TABLET ORAL 3 TIMES DAILY PRN
Qty: 20 TABLET | Refills: 0 | Status: SHIPPED | OUTPATIENT
Start: 2021-08-27 | End: 2021-09-08 | Stop reason: SDUPTHER

## 2021-08-27 RX ORDER — PREDNISONE 20 MG/1
20 TABLET ORAL 2 TIMES DAILY
Qty: 10 TABLET | Refills: 0 | Status: SHIPPED | OUTPATIENT
Start: 2021-08-27 | End: 2021-09-08 | Stop reason: SDUPTHER

## 2021-08-27 ASSESSMENT — ENCOUNTER SYMPTOMS
ABDOMINAL PAIN: 0
RESPIRATORY NEGATIVE: 1
VOMITING: 0
NAUSEA: 0

## 2021-08-27 NOTE — PROGRESS NOTES
[facility] Primary Care  [address] [place] [address]  Phone: [phone]  Fax: [phone]    Perico Pinon is a 48 y.o. female who presents today for her medical conditions/complaintsas noted below. Chief Complaint   Patient presents with    Back Pain     patient said left side sciatic pain started yesterday when walking in [place]. HPI:     HPI  Patient having a sciatic flare. Was walking into [place] and felt pain shoot down left leg. Tried heating pad and Ibuprofen and helped only the first day. Has had sciatica flares in the past    Is dealing with a left ankle fracture right now so is walking differently with this. Is in PT for this currently. Current Outpatient Medications   Medication Sig Dispense Refill    cyclobenzaprine (FLEXERIL) 5 MG tablet Take 1 tablet by mouth 3 times daily as needed for Muscle spasms 20 tablet 0    predniSONE (DELTASONE) 20 MG tablet Take 1 tablet by mouth 2 times daily for 5 days 10 tablet 0    fluticasone (FLONASE) 50 MCG/ACT nasal spray 2 sprays by Each Nostril route daily 1 Bottle 0    traZODone (DESYREL) 150 MG tablet Take 1 tablet by mouth nightly 30 tablet 0    ALPRAZolam (XANAX) 1 MG tablet Take 1 tablet by mouth 2 times daily as needed for Sleep or Anxiety for up to 30 days. 60 tablet 0    Multiple Vitamins-Minerals (ONE-A-DAY MENOPAUSE FORMULA PO) Take by mouth      Rhubarb (ESTROVEN COMPLETE) 4 MG TABS Take by mouth      guaiFENesin (MUCINEX MAXIMUM STRENGTH) 1200 MG TB12 Take 1,200 mg by mouth 2 times daily as needed (congestion) 30 tablet 0    aspirin 325 MG EC tablet Take 1 tablet by mouth daily 42 tablet 0    diclofenac (VOLTAREN) 50 MG EC tablet Take 1 tablet by mouth nightly as needed for Pain 60 tablet 0    Elastic Bandages & Supports (MEDICAL COMPRESSION STOCKINGS) MISC 2 each by Does not apply route daily Right and left knee high compression stockings. 30-40 mmHg.   To be worn continuously throughout the day. 4 each 3    ibuprofen (ADVIL;MOTRIN) 800 MG tablet Take 1 tablet by mouth 3 times daily (with meals) 90 tablet 1    butalbital-acetaminophen-caffeine (FIORICET, ESGIC) -40 MG per tablet Take 1 tablet by mouth every 6 hours as needed for Headaches 60 tablet 0    triamcinolone (KENALOG) 0.1 % ointment Apply topically 2 times daily 1 Tube 3    Ginger 500 MG CAPS Take by mouth Taking for dizziness - 1 in the morning (Patient not taking: Reported on 8/27/2021)       No current facility-administered medications for this visit. Allergies   Allergen Reactions    Diphenhydramine Itching    Sleep Aid [Diphenhydramine Hcl (Sleep)]        Subjective:      Review of Systems   Constitutional: Negative for chills, diaphoresis and fever. Respiratory: Negative. Cardiovascular: Negative. Gastrointestinal: Negative for abdominal pain, nausea and vomiting. Musculoskeletal:        Left sided buttock pain that shoots down leg   Skin: Negative for rash. Neurological: Negative for weakness and numbness. Objective:     /80   Pulse 84   Ht 5' 6\" (1.676 m)   Wt 138 lb (62.6 kg)   LMP 02/26/2018   SpO2 98%   BMI 22.27 kg/m²   Physical Exam  Vitals and nursing note reviewed. Constitutional:       General: She is in acute distress (in pain). Appearance: Normal appearance. She is normal weight. Cardiovascular:      Rate and Rhythm: Normal rate and regular rhythm. Heart sounds: Normal heart sounds. Pulmonary:      Effort: Pulmonary effort is normal.      Breath sounds: Normal breath sounds. Musculoskeletal:      Lumbar back: Spasms and tenderness present. No swelling, edema, deformity, signs of trauma or lacerations. Normal range of motion. No scoliosis. Back:       Comments: Pain located herer   Neurological:      Mental Status: She is alert and oriented to person, place, and time.    Psychiatric:         Mood and Affect: Mood normal.         Assessment: Diagnosis Orders   1. Left sided sciatica  cyclobenzaprine (FLEXERIL) 5 MG tablet    predniSONE (DELTASONE) 20 MG tablet    [facility] -  [place]        Plan:    Prednisone burst  Flexeril for muscle spasm  May use Tylenol for more pain relief. Added sciatica for PT order---seeing [facility] PT now for ankle fracture. Return if symptoms worsen or fail to improve.     Orders Placed This Encounter   Procedures    [facility]  [place]     Referral Priority:   Routine     Referral Type:   Eval and Treat     Referral Reason:   Specialty Services Required     Requested Specialty:   Physical Therapy     Number of Visits Requested:   1     Orders Placed This Encounter   Medications    cyclobenzaprine (FLEXERIL) 5 MG tablet     Sig: Take 1 tablet by mouth 3 times daily as needed for Muscle spasms     Dispense:  20 tablet     Refill:  0    predniSONE (DELTASONE) 20 MG tablet     Sig: Take 1 tablet by mouth 2 times daily for 5 days     Dispense:  10 tablet     Refill:  0           Electronically signed by Darron Duverney, PA-Con 8/27/2021 at 12:04 PM

## 2021-08-30 ENCOUNTER — HOSPITAL ENCOUNTER (OUTPATIENT)
Dept: PHYSICAL THERAPY | Facility: CLINIC | Age: 46
Setting detail: THERAPIES SERIES
Discharge: HOME | End: 2021-08-30
Payer: COMMERCIAL

## 2021-08-30 PROCEDURE — 97110 THERAPEUTIC EXERCISES: CPT

## 2021-08-30 PROCEDURE — 97016 VASOPNEUMATIC DEVICE THERAPY: CPT

## 2021-08-30 NOTE — FLOWSHEET NOTE
[] [facility] &  Therapy  [address]  [phone]  F: [phone] [] [address]  [place] [address]   [address]  P: [phone]  F: [phone] [x] [address]  Outpatient Rehabilitation &  Therapy  [address]  P: [phone]  F: [phone]     Physical Therapy Daily Treatment Note/Progress Note    Date:  2021  Patient Name:  Radha Del Castillo    :  1975  MRN: [id number]  Physician: Penny Lange MD     Insurance: [facility] ( visits approved) Pre-Auth after eval   Medical Diagnosis: Closed Nondisplaced Fracture of left lateral malleolus   Rehab Codes: M25.57, M25.67, M25.372  Onset date: 6/10/2021                       Next 's appt.: PRN   Visit# / total visits:      Cancels/No Shows: 0/0    Subjective:    Pain:  [] Yes  [x] No Location: (L) ankle Pain Rating: (0-10 scale) 0/10  Pain altered Tx:  [x] No  [] Yes  Action:  Comments: Pt mentioned that she was doing ok today. Modalities:   Precautions: WBAT  Exercises:  Exercise Reps/ Time Weight/ Level Comments    Spin bike 10'   x          SB S 30\"x3   Gastroc and Soleus  x   Hamstring S 30\"x3     x              BAPS   x30ea  L2 F/B, S/S, CW, CCW           Eccentric HR  x30  Back of treadmill     Balance SL (L) 90\" hold One UE // bars x   TG: Squats/HR x30 L18  x   TG: Squats SL x30 L12  x   Step up  20x 8\" 20 with L, 20 with R lead x   Lateral step ups 20x 8\"  x   BOSU lunges 30x  Forward and lateral x   Balance board  3m L2     Other:      Treatment Charges: Mins Units   []  Modalities     [x]  Ther Exercise 41 3   []  Manual Therapy     []  Ther Activities     []  Aquatics     [x]  Vasocompression 15 1   []  Other     Total Treatment time 56 4       Assessment: [x] Progressing toward goals. Continued working on increasing strength and stability while working into increasing ROM.  Pt demo improved balance in SLS and did not have to utilize UE support with step ups. Able to increase levels with single leg squats on TG. Finished with vaso to help decrease swelling. [] No change. [] Other:  [x] Patient would continue to benefit from skilled physical therapy services in order to improve toward     STG: (to be met in 8 treatments)  ? Pain: Patient will report pain as 0/10 in order to increase ease of performing ADLs (8/25/21: Patient reports 0/10 pain, however states there is a slight ache in the ankle) (MET)  ? ROM:   Patient will increase (B) ankle DF to 0 deg in order to improve gait mechanics (8/25/21: Patient demonstrates 10 deg from neutral of DF within (L) ankle) (NOT MET)   Patient will improve toe extension to 55 deg in order to improve push off during gait. (8/25/21: Patient demonstrates 45 deg of (L) first toe extension) (NOT MET)   ? Function: Patient will ambulate with consistent step length and even gait in order to reduce risk of falls (8/25/21: Patient demonstrates improve step length and consistent reciprocal gait pattern. Continues to demonstrate decreased stance time on (L) ankle and WBOS when fatigued. (MET)  Patient to be independent with home exercise program as demonstrated by performance with correct form without cues. (8/25/21: Patient reports completing HEP at least 5x/day) (MET)  Demonstrate Knowledge of fall prevention (MET)  LTG: (to be met in 16 treatments)          1. Patient will improve LEFS 34/80 in order to improve overall quality of life. 2. ? Strength: Patient will improve (L) ankle strength to 5/5 in order to increase stability within (L) ankle            3. Patient will report being able to vacuum home without pain in (L) ankle         . Pt. Education:  [x] Yes  [] No  [x] Reviewed Prior HEP/Ed  Method of Education: [x] Verbal  [x] Demo  [x] Written  Comprehension of Education:  [x] Verbalizes understanding.   [x] Demonstrates understanding. [x] Needs review. [] Demonstrates/verbalizes HEP/Ed previously given. Plan: [x] Continue current frequency toward long and short term goals.     [x] Specific Instructions for subsequent treatments: Continue to progress (L) ankle mobility and strength      Time In: 10:00am            Time Out:  11:05 am    Electronically signed by:  Corey Sumner PTA

## 2021-09-01 ENCOUNTER — HOSPITAL ENCOUNTER (OUTPATIENT)
Dept: PHYSICAL THERAPY | Facility: CLINIC | Age: 46
Setting detail: THERAPIES SERIES
Discharge: HOME | End: 2021-09-01
Payer: COMMERCIAL

## 2021-09-01 PROCEDURE — 97110 THERAPEUTIC EXERCISES: CPT

## 2021-09-01 PROCEDURE — 97016 VASOPNEUMATIC DEVICE THERAPY: CPT

## 2021-09-01 NOTE — FLOWSHEET NOTE
[] [facility]  [address]  [phone]  F: [phone] [] [address]  [place] [address]   [address]  P: [phone]  F: [phone] [x] [address]   [facility] &  Therapy  [address]  P: [phone]  F: [phone]     Physical Therapy Daily Treatment Note    Date:  2021  Patient Name:  Perico Pinon    :  1975  MRN: [id number]  Physician: Jason Rutledge MD     Insurance: [facility] ( visits approved) Pre-Auth after eval   Medical Diagnosis: Closed Nondisplaced Fracture of left lateral malleolus   Rehab Codes: M25.57, M25.67, M25.372  Onset date: 6/10/2021                       Next 's appt.: PRN   Visit# / total visits:      Cancels/No Shows: 0/0    Subjective:    Pain:  [] Yes  [x] No Location: (L) ankle Pain Rating: (0-10 scale) 0/10  Pain altered Tx:  [x] No  [] Yes  Action:  Comments: Pt stated that she feels like her ankle is doing better. Modalities:   Precautions: WBAT  Exercises:  Exercise Reps/ Time Weight/ Level Comments    Spin bike 10'   x          SB S 30\"x3   Gastroc and Soleus  x   Hamstring S 30\"x3     x              BAPS (2 feet on board)  x30ea  L2 F/B, S/S, CW, CCW x          Eccentric HR  x30  Back of treadmill  x   Re-bounder SL (L) 30x 3.5#    x   TG: Squats/HR x30 L18  x   TG: Squats SL x30 L14  x   BOSU step ups 20x 8\" 20 with L, 20 with R lead x   Lateral step ups 20x 8\"  x   BOSU lunges 30x  Forward and lateral x   Balance board  3m L3  x   Other:      Treatment Charges: Mins Units   []  Modalities     [x]  Ther Exercise 43 3   []  Manual Therapy     []  Ther Activities     []  Aquatics     [x]  Vasocompression 15 1   []  Other     Total Treatment time 58 4       Assessment: [x] Progressing toward goals.    Progressions added in today were step ups on BOSU to challenge pt on an unstable surface, increased height on SL squats on TG, SL stance with re-bounder, along with increased WB on BAPS. Pt required UE support with some of the progressions but able to complete with no increased symptoms. [] No change. [] Other:  [x] Patient would continue to benefit from skilled physical therapy services in order to improve toward     STG: (to be met in 8 treatments)  1. ? Pain: Patient will report pain as 0/10 in order to increase ease of performing ADLs (8/25/21: Patient reports 0/10 pain, however states there is a slight ache in the ankle) (MET)  2. ? ROM:   a. Patient will increase (B) ankle DF to 0 deg in order to improve gait mechanics (8/25/21: Patient demonstrates 10 deg from neutral of DF within (L) ankle) (NOT MET)   b. Patient will improve toe extension to 55 deg in order to improve push off during gait. (8/25/21: Patient demonstrates 45 deg of (L) first toe extension) (NOT MET)   3. ? Function: Patient will ambulate with consistent step length and even gait in order to reduce risk of falls (8/25/21: Patient demonstrates improve step length and consistent reciprocal gait pattern. Continues to demonstrate decreased stance time on (L) ankle and WBOS when fatigued. (MET)  4. Patient to be independent with home exercise program as demonstrated by performance with correct form without cues. (8/25/21: Patient reports completing HEP at least 5x/day) (MET)  5. Demonstrate Knowledge of fall prevention (MET)  LTG: (to be met in 16 treatments)          1. Patient will improve LEFS 34/80 in order to improve overall quality of life. 2. ? Strength: Patient will improve (L) ankle strength to 5/5 in order to increase stability within (L) ankle            3. Patient will report being able to vacuum home without pain in (L) ankle         . Pt.  Education:  [x] Yes  [] No  [x] Reviewed Prior HEP/Ed  Method of Education: [x] Verbal  [x] Demo  [x] Written  Comprehension of Education:  [x] Eunice understanding. [x] Demonstrates understanding. [x] Needs review. [] Demonstrates/verbalizes HEP/Ed previously given. Plan: [x] Continue current frequency toward long and short term goals.     [x] Specific Instructions for subsequent treatments: Continue to progress (L) ankle mobility and strength      Time In: 9:58am            Time Out:  11:06 am    Electronically signed by:  Myra Valero PTA

## 2021-09-07 ENCOUNTER — HOSPITAL ENCOUNTER (OUTPATIENT)
Dept: PHYSICAL THERAPY | Facility: CLINIC | Age: 46
Setting detail: THERAPIES SERIES
Discharge: HOME | End: 2021-09-07
Payer: COMMERCIAL

## 2021-09-07 DIAGNOSIS — M54.32 LEFT SIDED SCIATICA: ICD-10-CM

## 2021-09-07 NOTE — TELEPHONE ENCOUNTER
----- Message from Chi Hernández sent at 9/3/2021  8:21 AM EDT -----  Subject: Refill Request    QUESTIONS  Name of Medication? predniSONE (DELTASONE) 20 MG tablet  Patient-reported dosage and instructions? 20 mg twice a day   How many days do you have left? 0  Preferred Pharmacy? [id number]  Pharmacy phone number (if available)? [phone]  Additional Information for Provider? PT states that the rehab PT for her   sciatica nerve says her insurance is not approved even though Aidenlinda Morrisn   has approved it. She said the prednisone helps and she is out. She states   she has to get evaluated again for her sciatica nerve and she is only   allowed a certain amount of PT visits a year.  ---------------------------------------------------------------------------  --------------,  Name of Medication? cyclobenzaprine (FLEXERIL) 5 MG tablet  Patient-reported dosage and instructions? 5 mg twice a day   How many days do you have left? 0  Preferred Pharmacy? [id number] [address]  Pharmacy phone number (if available)? [phone]  Additional Information for Provider? Prefers [facility]. Pt would like a   higher dosage if possible, if not its okay she states. ---------------------------------------------------------------------------  --------------  La MENJIVAR  What is the best way for the office to contact you? OK to leave message on   voicemail, OK to respond with electronic message via [facility] portal (only   for patients who have registered [facility] account)  Preferred Call Back Phone Number?  [phone]

## 2021-09-07 NOTE — FLOWSHEET NOTE
[] [facility]) [facility] &  Therapy  [address]    [phone]  F: [phone]   [] [address]  [facility] [address]   [address]  P: [phone]  F: [phone]  [] [address]  [address]  P: [phone]  F: [phone] [] [address]  P: [phone]  F: [phone]  [] [address]  [address] [facility] [address]   [address]   [place]: [phone]  F: [phone]   [] [place] [address] [facility] [address]  [place]: [phone]   F: [phone]     Physical Therapy Cancel/No Show note    Date: 2021  Patient: Valery Rahman  : 1975  MRN: [id number]    Cancels/No Shows to date:     For today's appointment patient:    [x]  Cancelled    [] Rescheduled appointment    [] No-show     Reason given by patient:    []  Patient ill    []  Conflicting appointment    [x] No transportation      [] Conflict with work    [] No reason given    [] Weather related    [] [id number]    [] Other:      Comments:        [x] Next appointment was confirmed    Electronically signed by: Jan Martinez

## 2021-09-08 RX ORDER — PREDNISONE 20 MG/1
20 TABLET ORAL 2 TIMES DAILY
Qty: 10 TABLET | Refills: 0 | Status: SHIPPED | OUTPATIENT
Start: 2021-09-08 | End: 2021-09-13

## 2021-09-08 RX ORDER — CYCLOBENZAPRINE HCL 5 MG
5 TABLET ORAL 3 TIMES DAILY PRN
Qty: 20 TABLET | Refills: 0 | Status: SHIPPED | OUTPATIENT
Start: 2021-09-08 | End: 2022-06-28 | Stop reason: SDUPTHER

## 2021-09-09 ENCOUNTER — APPOINTMENT (OUTPATIENT)
Dept: PHYSICAL THERAPY | Facility: CLINIC | Age: 46
End: 2021-09-09
Payer: COMMERCIAL

## 2021-09-16 DIAGNOSIS — Z79.899 MEDICATION MANAGEMENT: ICD-10-CM

## 2021-09-16 DIAGNOSIS — F51.01 PRIMARY INSOMNIA: ICD-10-CM

## 2021-09-16 DIAGNOSIS — F41.9 ANXIETY: ICD-10-CM

## 2021-09-16 RX ORDER — ALPRAZOLAM 1 MG/1
1 TABLET ORAL 2 TIMES DAILY PRN
Qty: 60 TABLET | Refills: 0 | Status: SHIPPED | OUTPATIENT
Start: 2021-09-16 | End: 2021-10-18

## 2021-09-16 RX ORDER — TRAZODONE HYDROCHLORIDE 150 MG/1
150 TABLET ORAL NIGHTLY
Qty: 30 TABLET | Refills: 0 | Status: SHIPPED | OUTPATIENT
Start: 2021-09-16 | End: 2021-10-19

## 2021-09-24 DIAGNOSIS — S82.65XD CLOSED NONDISPLACED FRACTURE OF LATERAL MALLEOLUS OF LEFT FIBULA WITH ROUTINE HEALING, SUBSEQUENT ENCOUNTER: Primary | ICD-10-CM

## 2021-09-27 ENCOUNTER — OFFICE VISIT (OUTPATIENT)
Dept: ORTHOPEDIC SURGERY | Age: 46
End: 2021-09-27
Payer: COMMERCIAL

## 2021-09-27 VITALS — WEIGHT: 138 LBS | BODY MASS INDEX: 22.18 KG/M2 | HEIGHT: 66 IN | RESPIRATION RATE: 12 BRPM

## 2021-09-27 DIAGNOSIS — S82.65XD CLOSED NONDISPLACED FRACTURE OF LATERAL MALLEOLUS OF LEFT FIBULA WITH ROUTINE HEALING, SUBSEQUENT ENCOUNTER: Primary | ICD-10-CM

## 2021-09-27 PROCEDURE — 4004F PT TOBACCO SCREEN RCVD TLK: CPT | Performed by: ORTHOPAEDIC SURGERY

## 2021-09-27 PROCEDURE — 99212 OFFICE O/P EST SF 10 MIN: CPT | Performed by: ORTHOPAEDIC SURGERY

## 2021-09-27 PROCEDURE — G8427 DOCREV CUR MEDS BY ELIG CLIN: HCPCS | Performed by: ORTHOPAEDIC SURGERY

## 2021-09-27 PROCEDURE — G8420 CALC BMI NORM PARAMETERS: HCPCS | Performed by: ORTHOPAEDIC SURGERY

## 2021-09-27 NOTE — PROGRESS NOTES
pain.   Respiratory: Negative for shortness of breath. Cardiovascular: Negative for chest pain. Gastrointestinal: Negative for abdominal pain. Genitourinary: Negative for dysuria. Skin: Negative for rash. Neurological: Negative for headaches. Hematological: Does not bruise/bleed easily. Musculoskeletal: See HPI for pertinent positives     Past Medical History:    She  has a past medical history of Anxiety, Endometriosis, Fibroid tumor (2017), Ovarian cyst, and Wears dentures. Past Surgical History:    She  has a past surgical history that includes Breast surgery (2004); Hysterectomy; [place] tooth extraction; laparoscopy (N/A, 9/24/2020); pr catheter, ureteral (Bilateral, 9/24/2020); and laparoscopic appendectomy (N/A, 9/24/2020). Current Medications:     Current Outpatient Medications:     diclofenac (VOLTAREN) 50 MG EC tablet, Take 1 tablet by mouth nightly as needed for Pain, Disp: 60 tablet, Rfl: 0    traZODone (DESYREL) 150 MG tablet, Take 1 tablet by mouth nightly, Disp: 30 tablet, Rfl: 0    ALPRAZolam (XANAX) 1 MG tablet, Take 1 tablet by mouth 2 times daily as needed for Sleep or Anxiety for up to 30 days. , Disp: 60 tablet, Rfl: 0    fluticasone (FLONASE) 50 MCG/ACT nasal spray, 2 sprays by Each Nostril route daily, Disp: 1 Bottle, Rfl: 0    Multiple Vitamins-Minerals (ONE-A-DAY MENOPAUSE FORMULA PO), Take by mouth, Disp: , Rfl:     Rhubarb (ESTROVEN COMPLETE) 4 MG TABS, Take by mouth, Disp: , Rfl:     Ginger 500 MG CAPS, Take by mouth Taking for dizziness - 1 in the morning (Patient not taking: Reported on 8/27/2021), Disp: , Rfl:     guaiFENesin (MUCINEX MAXIMUM STRENGTH) 1200 MG TB12, Take 1,200 mg by mouth 2 times daily as needed (congestion), Disp: 30 tablet, Rfl: 0    aspirin 325 MG EC tablet, Take 1 tablet by mouth daily, Disp: 42 tablet, Rfl: 0    Elastic Bandages & Supports (MEDICAL COMPRESSION STOCKINGS) MISC, 2 each by Does not apply route daily Right and left knee high compression stockings. 30-40 mmHg. To be worn continuously throughout the day., Disp: 4 each, Rfl: 3    ibuprofen (ADVIL;MOTRIN) 800 MG tablet, Take 1 tablet by mouth 3 times daily (with meals), Disp: 90 tablet, Rfl: 1    butalbital-acetaminophen-caffeine (FIORICET, ESGIC) -40 MG per tablet, Take 1 tablet by mouth every 6 hours as needed for Headaches, Disp: 60 tablet, Rfl: 0    triamcinolone (KENALOG) 0.1 % ointment, Apply topically 2 times daily, Disp: 1 Tube, Rfl: 3     Allergies:    Diphenhydramine and Sleep aid [diphenhydramine hcl (sleep)]    Family History:  family history includes Colon Cancer in her paternal grandfather. Social History:   Social History     Occupational History    Not on file   Tobacco Use    Smoking status: Current Every Day Smoker     Packs/day: 0.75     Years: 20.00     Pack years: 15.00     Types: Cigarettes    Smokeless tobacco: Never Used   Vaping Use    Vaping Use: Never used   Substance and Sexual Activity    Alcohol use: No     Comment: social    Drug use: Not Currently     Frequency: 7.0 times per week     Types: Marijuana    Sexual activity: Not on file     Occupation: Lost job due to Tractive     OBJECTIVE:  Resp 12   Ht 5' 6\" (1.676 m)   Wt 138 lb (62.6 kg)   LMP 02/26/2018   BMI 22.27 kg/m²    Psych: alert and oriented to person, time, and place   Cardio:  well perfused extremities, no cyanosis   Resp:  normal respiratory effort  Musculoskeletal:    Affected lower extremity:    Vascular: Limb well perfused, compartments soft/compressible. Skin: No erythema/ulcers. Intact.    Neurovascular Status:  Grossly neurovascularly intact throughout  Motion:  Grossly able to fire major muscle groups with appropriate/expected AROM  Tenderness to Palpation: None  -Negative squeeze test for syndesmotic injury      RADIOLOGY:   9/27/2021 FINDINGS:  Three views (AP, Mortise, and Lateral) of the left ankle were obtained in the office today and reviewed, revealing none displaced Samaniego B distal fibula fracture. No widening of the medial clear space or syndesmosis. No interval displacement. Interval healing is noted. IMPRESSION:  Osseous injury as above. Electronically signed by Nuris Temple MD        Relevant previous imaging reviewed, both imaging and report(s) as below:    XR KNEE LEFT (3 VIEWS)    Result Date: 6/10/2021  No acute osseous abnormality identified. XR ANKLE LEFT (MIN 3 VIEWS)    Result Date: 6/10/2021  1. Oblique nondisplaced fracture of the distal fibular shaft. Ankle mortise alignment appears maintained. 2.  No acute osseous abnormality identified in the foot. XR FOOT LEFT (MIN 3 VIEWS)    Result Date: 6/10/2021  1. Oblique nondisplaced fracture of the distal fibular shaft. Ankle mortise alignment appears maintained. 2.  No acute osseous abnormality identified in the foot. ASSESSMENT AND PLAN:  Body mass index is 22.27 kg/m². She has a left nondisplaced lateral malleolus fracture, sustained on 6/10/2021. She has healed well with conservative management. Notably, she has the past medical history as above. She has a history of tobacco use (reports that she smokes about 1 pack of cigarettes per day). We had a discussion today about the likely diagnosis and its natural history, physical exam and imaging findings, as well as various treatment options in detail. Surgically, we discussed that no surgery is indicated, and she has healed very well with conservative management. Orders/referrals were placed as below at today's visit. We discussed she has no specific restrictions. She may continue to advance her activity as tolerated. No immobilization is needed for stability at this time. She may continue weight bearing as tolerated. All questions were answered and the above plan was agreed upon. The patient will return to clinic in the future as needed.                  At the patient's next visit, depending on how the patient is doing and/or new imaging/labs results, we may consider the following options:    []  Lace up ankle     []  CAM boot         []  removable wrist brace     []  PT:        []  Wean out immobilization         []  Adv activity      []  Footmind        []  Spenco       []  Custom Orthotic:               []  AZ brace                    []  Rocker Bottom      []  Night splint    []  Heel cups        []  Strap        []  Toe gizmos    []  Topl        []  NSAIDs         []  Bobo        []  Ref:         []  Stress Xray    []  CT        []  MRI  []  Inj:          []  Consider OR      []  Pick OR date    No follow-ups on file. No orders of the defined types were placed in this encounter. No orders of the defined types were placed in this encounter. Stephanie Villavicencio MD  Orthopedic Surgery        Please excuse any typos/errors, as this note was created with the assistance of voice recognition software. While intending to generate a document that actually reflects the content of the visit, the document can still have some errors including those of syntax and sound-a-like substitutions which may escape proof reading. In such instances, actual meaning can be extrapolated by context.

## 2021-10-11 ENCOUNTER — OFFICE VISIT (OUTPATIENT)
Dept: PRIMARY CARE CLINIC | Age: 46
End: 2021-10-11
Payer: COMMERCIAL

## 2021-10-11 VITALS
OXYGEN SATURATION: 98 % | HEART RATE: 94 BPM | SYSTOLIC BLOOD PRESSURE: 116 MMHG | DIASTOLIC BLOOD PRESSURE: 64 MMHG | HEIGHT: 66 IN | WEIGHT: 142.4 LBS | TEMPERATURE: 99.1 F | BODY MASS INDEX: 22.88 KG/M2

## 2021-10-11 DIAGNOSIS — Z79.899 MEDICATION MANAGEMENT: ICD-10-CM

## 2021-10-11 DIAGNOSIS — Z20.822 CLOSE EXPOSURE TO COVID-19 VIRUS: Primary | ICD-10-CM

## 2021-10-11 DIAGNOSIS — F41.9 ANXIETY: ICD-10-CM

## 2021-10-11 PROCEDURE — G8427 DOCREV CUR MEDS BY ELIG CLIN: HCPCS | Performed by: NURSE PRACTITIONER

## 2021-10-11 PROCEDURE — G8484 FLU IMMUNIZE NO ADMIN: HCPCS | Performed by: NURSE PRACTITIONER

## 2021-10-11 PROCEDURE — 4004F PT TOBACCO SCREEN RCVD TLK: CPT | Performed by: NURSE PRACTITIONER

## 2021-10-11 PROCEDURE — 99213 OFFICE O/P EST LOW 20 MIN: CPT | Performed by: NURSE PRACTITIONER

## 2021-10-11 PROCEDURE — G8420 CALC BMI NORM PARAMETERS: HCPCS | Performed by: NURSE PRACTITIONER

## 2021-10-11 ASSESSMENT — PATIENT HEALTH QUESTIONNAIRE - PHQ9
SUM OF ALL RESPONSES TO PHQ QUESTIONS 1-9: 0
SUM OF ALL RESPONSES TO PHQ9 QUESTIONS 1 & 2: 0
1. LITTLE INTEREST OR PLEASURE IN DOING THINGS: 0
2. FEELING DOWN, DEPRESSED OR HOPELESS: 0

## 2021-10-11 ASSESSMENT — ENCOUNTER SYMPTOMS
VOMITING: 0
SHORTNESS OF BREATH: 0
COUGH: 0
EYE DISCHARGE: 0
RHINORRHEA: 0
ABDOMINAL PAIN: 0
SORE THROAT: 0
NAUSEA: 0
DIARRHEA: 0
EYE REDNESS: 0
WHEEZING: 0

## 2021-10-11 NOTE — PROGRESS NOTES
[address]  PRIMARY CARE  [address]  [facility] [address] [place] [address]  Dept: [phone]    Brandi Gregorio is a 48 y.o. female Established patient, who presents today for her medical conditions/complaints as noted below. Chief Complaint   Patient presents with    Other     Exposure to coivd, w/o symptoms    Flu Vaccine     Declined       HPI:     HPI  Face is getting hot  She was exposed Thursday and Friday for approximately for approximately 3 hours. She did not have a mask on. Anxiety is controlled as long as she takes alprazolam.  She is sleeping OK. She started drinking boost drinks and is taking vitamins. She is smoking 15 cigarettes  per day. No alcohol and street drugs. Ankle is much better. She has completed physical therapy.       Reviewed prior notes  orthopedics  Reviewed previous      No results found for: LDLCHOLESTEROL, LDLCALC    (goal LDL is <100)   AST (U/L)   Date Value   04/20/2021 16     ALT (U/L)   Date Value   04/20/2021 22     BUN (mg/dL)   Date Value   04/20/2021 18     BP Readings from Last 3 Encounters:   10/11/21 116/64   08/27/21 132/80   07/12/21 124/82          (goal 120/80)    Past Medical History:   Diagnosis Date    Anxiety     Endometriosis     Fibroid tumor 2017    Ovarian cyst     Wears dentures     uppers      Past Surgical History:   Procedure Laterality Date    BREAST SURGERY  2004    lumpectomy of the L     HYSTERECTOMY      cervix was taken    LAPAROSCOPIC APPENDECTOMY N/A 9/24/2020    APPENDECTOMY, LAPAROSCOPIC performed by Ruby Bowles MD at [facility] N/A 9/24/2020    OPERATIVE LAPAROSCOPY WITH RIGHT ADNEXECTOMY performed by Yolie Bray DO at [address], ureteral Bilateral 9/24/2020    URETERAL CATHETER INSERTION performed by India Leon MD at [address] [facility]         Family History   Problem Relation Age of Onset    Colon Cancer Paternal Grandfather Social History     Tobacco Use    Smoking status: Current Every Day Smoker     Packs/day: 0.75     Years: 20.00     Pack years: 15.00     Types: Cigarettes    Smokeless tobacco: Never Used   Substance Use Topics    Alcohol use: No     Comment: social      Current Outpatient Medications   Medication Sig Dispense Refill    diclofenac (VOLTAREN) 50 MG EC tablet Take 1 tablet by mouth nightly as needed for Pain 60 tablet 0    traZODone (DESYREL) 150 MG tablet Take 1 tablet by mouth nightly 30 tablet 0    ALPRAZolam (XANAX) 1 MG tablet Take 1 tablet by mouth 2 times daily as needed for Sleep or Anxiety for up to 30 days. 60 tablet 0    fluticasone (FLONASE) 50 MCG/ACT nasal spray 2 sprays by Each Nostril route daily 1 Bottle 0    Multiple Vitamins-Minerals (ONE-A-DAY MENOPAUSE FORMULA PO) Take by mouth      Rhubarb (ESTROVEN COMPLETE) 4 MG TABS Take by mouth      guaiFENesin (MUCINEX MAXIMUM STRENGTH) 1200 MG TB12 Take 1,200 mg by mouth 2 times daily as needed (congestion) 30 tablet 0    Elastic Bandages & Supports (MEDICAL COMPRESSION STOCKINGS) MISC 2 each by Does not apply route daily Right and left knee high compression stockings. 30-40 mmHg. To be worn continuously throughout the day. 4 each 3    ibuprofen (ADVIL;MOTRIN) 800 MG tablet Take 1 tablet by mouth 3 times daily (with meals) 90 tablet 1    butalbital-acetaminophen-caffeine (FIORICET, ESGIC) -40 MG per tablet Take 1 tablet by mouth every 6 hours as needed for Headaches 60 tablet 0    triamcinolone (KENALOG) 0.1 % ointment Apply topically 2 times daily 1 Tube 3    aspirin 325 MG EC tablet Take 1 tablet by mouth daily 42 tablet 0     No current facility-administered medications for this visit.      Allergies   Allergen Reactions    Diphenhydramine Itching    Sleep Aid [Diphenhydramine Hcl (Sleep)]        Health Maintenance   Topic Date Due    Hepatitis C screen  Never done    HIV screen  Never done    Lipid screen  Never done  Colon cancer screen colonoscopy  Never done    Flu vaccine (1) 09/01/2021    DTaP/Tdap/Td vaccine (2 - Td or Tdap) 10/09/2030    Pneumococcal 0-64 years Vaccine (2 of 2 - PPSV23) 03/27/2038    COVID-19 Vaccine  Completed    Hepatitis A vaccine  Aged Out    Hepatitis B vaccine  Aged Out    Hib vaccine  Aged Out    Meningococcal (ACWY) vaccine  Aged Out       Subjective:      Review of Systems   Constitutional: Negative for chills and fever. Face feels flush   HENT: Negative for rhinorrhea and sore throat. Eyes: Negative for discharge and redness. Respiratory: Negative for cough, shortness of breath and wheezing. Cardiovascular: Negative for chest pain and palpitations. Gastrointestinal: Negative for abdominal pain, diarrhea, nausea and vomiting. Genitourinary: Negative for dysuria and frequency. Musculoskeletal: Negative for arthralgias and myalgias. Skin: Negative for rash and wound. Neurological: Positive for headaches. Negative for dizziness and light-headedness. Psychiatric/Behavioral: Negative for dysphoric mood and sleep disturbance. The patient is nervous/anxious. Objective:     /64   Pulse 94   Temp 99.1 °F (37.3 °C)   Ht 5' 6\" (1.676 m)   Wt 142 lb 6.4 oz (64.6 kg)   LMP 02/26/2018   SpO2 98%   BMI 22.98 kg/m²   Physical Exam  Vitals and nursing note reviewed. Constitutional:       General: She is not in acute distress. Appearance: She is well-developed. She is not ill-appearing. HENT:      Head: Normocephalic and atraumatic. Right Ear: External ear normal.      Left Ear: External ear normal.   Eyes:      General: No scleral icterus. Right eye: No discharge. Left eye: No discharge. Conjunctiva/sclera: Conjunctivae normal.      Pupils: Pupils are equal, round, and reactive to light. Neck:      Thyroid: No thyromegaly. Trachea: No tracheal deviation.    Cardiovascular:      Rate and Rhythm: Normal rate and regular rhythm. Heart sounds: Normal heart sounds. Comments: No carotid bruit  Pulmonary:      Effort: Pulmonary effort is normal. No respiratory distress. Breath sounds: Normal breath sounds. No wheezing. Lymphadenopathy:      Cervical: No cervical adenopathy. Skin:     General: Skin is warm. Findings: No rash. Neurological:      Mental Status: She is alert and oriented to person, place, and time. Psychiatric:         Mood and Affect: Mood normal.         Behavior: Behavior normal.         Thought Content: Thought content normal.     Controlled substances monitoring: possible medication side effects, risk of tolerance and/or dependence, and alternative treatments discussed, no signs of potential drug abuse or diversion identified and OARRS report reviewed today- activity consistent with treatment plan. Assessment/Plan:   1. Close exposure to COVID-19 virus  2. Medication management  3. Anxiety       Return in about 3 months (around 1/11/2022) for anxiety med check. No orders of the defined types were placed in this encounter. No orders of the defined types were placed in this encounter. Patient given educational materials - see patient instructions. Discussed use, benefit, and side effects of prescribed medications. All patient questions answered. Pt voiced understanding. Reviewed health maintenance. Instructed to continue current medications, diet and exercise. Patient agreed with treatment plan. Follow up as directed.      Electronically signed by ANITA Verma CNP on 10/11/2021 at 9:53 PM

## 2021-10-17 DIAGNOSIS — F41.9 ANXIETY: ICD-10-CM

## 2021-10-17 DIAGNOSIS — Z79.899 MEDICATION MANAGEMENT: ICD-10-CM

## 2021-10-17 DIAGNOSIS — F51.01 PRIMARY INSOMNIA: ICD-10-CM

## 2021-10-19 RX ORDER — TRAZODONE HYDROCHLORIDE 150 MG/1
TABLET ORAL
Qty: 30 TABLET | Refills: 0 | Status: SHIPPED | OUTPATIENT
Start: 2021-10-19 | End: 2021-11-15 | Stop reason: SDUPTHER

## 2021-10-19 RX ORDER — ALPRAZOLAM 1 MG/1
TABLET ORAL
Qty: 60 TABLET | Refills: 0 | Status: SHIPPED | OUTPATIENT
Start: 2021-10-19 | End: 2021-11-15 | Stop reason: SDUPTHER

## 2021-11-15 DIAGNOSIS — F41.9 ANXIETY: ICD-10-CM

## 2021-11-15 DIAGNOSIS — Z79.899 MEDICATION MANAGEMENT: ICD-10-CM

## 2021-11-15 DIAGNOSIS — F51.01 PRIMARY INSOMNIA: ICD-10-CM

## 2021-11-15 RX ORDER — ALPRAZOLAM 1 MG/1
1 TABLET ORAL 2 TIMES DAILY PRN
Qty: 60 TABLET | Refills: 0 | Status: SHIPPED | OUTPATIENT
Start: 2021-11-15 | End: 2021-12-14 | Stop reason: SDUPTHER

## 2021-11-15 RX ORDER — TRAZODONE HYDROCHLORIDE 150 MG/1
150 TABLET ORAL NIGHTLY
Qty: 30 TABLET | Refills: 0 | Status: SHIPPED | OUTPATIENT
Start: 2021-11-15 | End: 2021-12-14 | Stop reason: SDUPTHER

## 2021-11-24 DIAGNOSIS — J06.9 UPPER RESPIRATORY TRACT INFECTION, UNSPECIFIED TYPE: ICD-10-CM

## 2021-11-24 RX ORDER — FLUTICASONE PROPIONATE 50 MCG
2 SPRAY, SUSPENSION (ML) NASAL DAILY
Qty: 1 EACH | Refills: 0 | Status: SHIPPED | OUTPATIENT
Start: 2021-11-24 | End: 2022-01-31

## 2021-11-25 RX ORDER — GUAIFENESIN 1200 MG/1
1200 TABLET, EXTENDED RELEASE ORAL 2 TIMES DAILY PRN
Qty: 30 TABLET | Refills: 0 | Status: SHIPPED | OUTPATIENT
Start: 2021-11-25

## 2021-12-14 DIAGNOSIS — F41.9 ANXIETY: ICD-10-CM

## 2021-12-14 DIAGNOSIS — F51.01 PRIMARY INSOMNIA: ICD-10-CM

## 2021-12-14 DIAGNOSIS — Z79.899 MEDICATION MANAGEMENT: ICD-10-CM

## 2021-12-14 RX ORDER — ALPRAZOLAM 1 MG/1
1 TABLET ORAL 2 TIMES DAILY PRN
Qty: 60 TABLET | Refills: 0 | Status: SHIPPED | OUTPATIENT
Start: 2021-12-14 | End: 2022-01-12 | Stop reason: SDUPTHER

## 2021-12-14 RX ORDER — TRAZODONE HYDROCHLORIDE 150 MG/1
150 TABLET ORAL NIGHTLY
Qty: 30 TABLET | Refills: 0 | Status: SHIPPED | OUTPATIENT
Start: 2021-12-14 | End: 2022-01-12 | Stop reason: SDUPTHER

## 2022-01-10 ENCOUNTER — HOSPITAL ENCOUNTER (OUTPATIENT)
Age: 47
Setting detail: SPECIMEN
Discharge: HOME | End: 2022-01-10

## 2022-01-10 ENCOUNTER — OFFICE VISIT (OUTPATIENT)
Dept: PRIMARY CARE CLINIC | Age: 47
End: 2022-01-10
Payer: COMMERCIAL

## 2022-01-10 VITALS
DIASTOLIC BLOOD PRESSURE: 86 MMHG | BODY MASS INDEX: 22.98 KG/M2 | OXYGEN SATURATION: 95 % | WEIGHT: 142.4 LBS | HEART RATE: 98 BPM | SYSTOLIC BLOOD PRESSURE: 138 MMHG

## 2022-01-10 DIAGNOSIS — Z79.899 MEDICATION MANAGEMENT: ICD-10-CM

## 2022-01-10 DIAGNOSIS — J01.40 ACUTE NON-RECURRENT PANSINUSITIS: ICD-10-CM

## 2022-01-10 DIAGNOSIS — R05.9 COUGH: ICD-10-CM

## 2022-01-10 DIAGNOSIS — U07.1 COVID: Primary | ICD-10-CM

## 2022-01-10 DIAGNOSIS — F41.9 ANXIETY: ICD-10-CM

## 2022-01-10 PROCEDURE — G8484 FLU IMMUNIZE NO ADMIN: HCPCS | Performed by: NURSE PRACTITIONER

## 2022-01-10 PROCEDURE — G8427 DOCREV CUR MEDS BY ELIG CLIN: HCPCS | Performed by: NURSE PRACTITIONER

## 2022-01-10 PROCEDURE — 4004F PT TOBACCO SCREEN RCVD TLK: CPT | Performed by: NURSE PRACTITIONER

## 2022-01-10 PROCEDURE — G8420 CALC BMI NORM PARAMETERS: HCPCS | Performed by: NURSE PRACTITIONER

## 2022-01-10 PROCEDURE — 99214 OFFICE O/P EST MOD 30 MIN: CPT | Performed by: NURSE PRACTITIONER

## 2022-01-10 RX ORDER — BENZONATATE 200 MG/1
200 CAPSULE ORAL 3 TIMES DAILY PRN
Qty: 30 CAPSULE | Refills: 0 | Status: SHIPPED | OUTPATIENT
Start: 2022-01-10 | End: 2022-01-20

## 2022-01-10 RX ORDER — AMOXICILLIN AND CLAVULANATE POTASSIUM 875; 125 MG/1; MG/1
1 TABLET, FILM COATED ORAL 2 TIMES DAILY
Qty: 20 TABLET | Refills: 0 | Status: SHIPPED | OUTPATIENT
Start: 2022-01-10 | End: 2022-01-20

## 2022-01-10 ASSESSMENT — ENCOUNTER SYMPTOMS
SHORTNESS OF BREATH: 1
WHEEZING: 1
COUGH: 1
VOMITING: 0
NAUSEA: 1
SINUS PRESSURE: 1
DIARRHEA: 0
SINUS PAIN: 1

## 2022-01-10 NOTE — PROGRESS NOTES
[address] [place] [address] PRIMARY CARE  [id number] [facility] [address]  Dept: Joss Cardenas is a 48 y.o. female Established patient, who presents today for her medical conditions/complaints as noted below. Chief Complaint   Patient presents with    Dizziness     sx's started Friday 01/07/2022    Pharyngitis    Cough       HPI:     HPI  Friday night and into Saturday morning she was super dizzy. She stayed up from work  Sore throat started Thursday maybe Wednesday and is still present and is getting worse. Dizzy tilting head back  She is drinking two protein shakes daily. Cough is productive with green mucous. Sometimes when blow nose there is blood in it. She is having fever and chills off and on. Cigarettes test bad. She is smoking less now that it taste bad. One she gets dizzy she feels like it rushes down body into stomach and makes her nausea. No vomiting. No diarrhea. Body aches.     Reviewed prior notes None  Reviewed previous     anxiety is controlled as long as she takes Xanax  She uses trazodone every night  No street drugs    No results found for: LDLCHOLESTEROL, LDLCALC    (goal LDL is <100)   AST (U/L)   Date Value   04/20/2021 16     ALT (U/L)   Date Value   04/20/2021 22     BUN (mg/dL)   Date Value   04/20/2021 18     BP Readings from Last 3 Encounters:   01/10/22 138/86   10/11/21 116/64   08/27/21 132/80          (goal 120/80)    Past Medical History:   Diagnosis Date    Anxiety     Endometriosis     Fibroid tumor 2017    Ovarian cyst     Wears dentures     uppers      Past Surgical History:   Procedure Laterality Date    BREAST SURGERY  2004    lumpectomy of the L     HYSTERECTOMY      cervix was taken    LAPAROSCOPIC APPENDECTOMY N/A 9/24/2020    APPENDECTOMY, LAPAROSCOPIC performed by Aubrey Cary MD at [address] 9/24/2020    OPERATIVE LAPAROSCOPY WITH RIGHT ADNEXECTOMY performed by Marko Klein hours as needed for Headaches 60 tablet 0    triamcinolone (KENALOG) 0.1 % ointment Apply topically 2 times daily 1 Tube 3    aspirin 325 MG EC tablet Take 1 tablet by mouth daily 42 tablet 0     No current facility-administered medications for this visit. Allergies   Allergen Reactions    Diphenhydramine Itching    Sleep Aid [Diphenhydramine Hcl (Sleep)]        Health Maintenance   Topic Date Due    Hepatitis C screen  Never done    HIV screen  Never done    Lipid screen  Never done    Colon cancer screen colonoscopy  Never done    Flu vaccine (1) 09/01/2021    COVID-19 Vaccine (3 - Booster for Pfizer series) 10/14/2021    Depression Screen  10/11/2022    DTaP/Tdap/Td vaccine (2 - Td or Tdap) 10/09/2030    Pneumococcal 0-64 years Vaccine (2 of 2 - PPSV23) 03/27/2038    Hepatitis A vaccine  Aged Out    Hepatitis B vaccine  Aged Out    Hib vaccine  Aged Out    Meningococcal (ACWY) vaccine  Aged Out       Subjective:      Review of Systems   Constitutional: Positive for activity change, chills, fatigue and fever. HENT: Positive for congestion, postnasal drip, sinus pressure and sinus pain. Respiratory: Positive for cough, shortness of breath and wheezing. Cardiovascular: Negative for chest pain and leg swelling. Gastrointestinal: Positive for nausea. Negative for diarrhea and vomiting. Genitourinary: Negative for difficulty urinating and dysuria. Neurological: Positive for dizziness and headaches. Objective:     /86   Pulse 98   Wt 142 lb 6.4 oz (64.6 kg)   LMP 02/26/2018   SpO2 95%   BMI 22.98 kg/m²   Physical Exam  Vitals and nursing note reviewed. Constitutional:       Appearance: She is ill-appearing. HENT:      Head: Normocephalic and atraumatic.       Right Ear: Tympanic membrane, ear canal and external ear normal.      Left Ear: Tympanic membrane, ear canal and external ear normal.      Nose:      Right Sinus: Maxillary sinus tenderness and frontal sinus tenderness present. Left Sinus: Maxillary sinus tenderness and frontal sinus tenderness present. Mouth/Throat:      Pharynx: Pharyngeal swelling and posterior oropharyngeal erythema present. No oropharyngeal exudate. Tonsils: 2+ on the right. 2+ on the left. Cardiovascular:      Rate and Rhythm: Normal rate and regular rhythm. Heart sounds: Normal heart sounds. Pulmonary:      Breath sounds: Wheezing present. Abdominal:      General: Bowel sounds are normal.      Palpations: Abdomen is soft. Musculoskeletal:      Right lower leg: No edema. Left lower leg: No edema. Skin:     General: Skin is warm and dry. Neurological:      Mental Status: She is alert and oriented to person, place, and time. Psychiatric:         Mood and Affect: Mood is anxious. Cognition and Memory: Cognition and memory normal.         Assessment/Plan:   1. COVID  -     COVID-19; Future  2. Acute non-recurrent pansinusitis  -     amoxicillin-clavulanate (AUGMENTIN) 875-125 MG per tablet; Take 1 tablet by mouth 2 times daily for 10 days, Disp-20 tablet, R-0Normal  3. Cough  -     benzonatate (TESSALON) 200 MG capsule; Take 1 capsule by mouth 3 times daily as needed for Cough, Disp-30 capsule, R-0Normal  4. Anxiety  5. Medication management     Augmentin BID X 10 days  Benzonatate as needed for cough  Off work until [place]. Try to decrease smoking. Return if symptoms worsen or fail to improve. Orders Placed This Encounter   Procedures    COVID-19     Standing Status:   Future     Standing Expiration Date:   1/10/2023     Scheduling Instructions:      1) Due to current limited availability of the COVID-19 test, tests will be prioritized based on responses to questions above. Testing may be delayed due to volume.             2) Print and instruct patient to adhere to [facility] home isolation program. (Link Above)              3) Set up or refer patient for a monitoring program.              4) Have patient sign up for and leverage [facility] (if not previously done). Order Specific Question:   Is this test for diagnosis or screening? Answer:   Diagnosis of ill patient     Order Specific Question:   Symptomatic for COVID-19 as defined by CDC? Answer:   Yes     Order Specific Question:   Date of Symptom Onset     Answer:   1/7/2022     Order Specific Question:   Hospitalized for COVID-19? Answer:   No     Order Specific Question:   Admitted to ICU for COVID-19? Answer:   No     Order Specific Question:   Employed in healthcare setting? Answer:   No     Order Specific Question:   Resident in a congregate (group) care setting? Answer:   No     Order Specific Question:   Pregnant? Answer:   No     Order Specific Question:   Previously tested for COVID-19? Answer:   Yes     Orders Placed This Encounter   Medications    amoxicillin-clavulanate (AUGMENTIN) 875-125 MG per tablet     Sig: Take 1 tablet by mouth 2 times daily for 10 days     Dispense:  20 tablet     Refill:  0    benzonatate (TESSALON) 200 MG capsule     Sig: Take 1 capsule by mouth 3 times daily as needed for Cough     Dispense:  30 capsule     Refill:  0       Patient given educational materials - see patient instructions. Discussed use, benefit, and side effects of prescribed medications. All patient questions answered. Pt voiced understanding. Reviewed health maintenance. Instructed to continue current medications, diet and exercise. Patient agreed with treatment plan. Follow up as directed.      Electronically signed by Archie Krabbe, APRN - CNP on 1/10/2022 at 7:34 PM

## 2022-01-11 DIAGNOSIS — U07.1 COVID: ICD-10-CM

## 2022-01-12 DIAGNOSIS — F51.01 PRIMARY INSOMNIA: ICD-10-CM

## 2022-01-12 DIAGNOSIS — F41.9 ANXIETY: ICD-10-CM

## 2022-01-12 DIAGNOSIS — Z79.899 MEDICATION MANAGEMENT: ICD-10-CM

## 2022-01-12 LAB
SARS-COV-2: NORMAL
SARS-COV-2: NOT DETECTED
SOURCE: NORMAL

## 2022-01-12 RX ORDER — ALPRAZOLAM 1 MG/1
1 TABLET ORAL 2 TIMES DAILY PRN
Qty: 60 TABLET | Refills: 0 | Status: SHIPPED | OUTPATIENT
Start: 2022-01-12 | End: 2022-02-09 | Stop reason: SDUPTHER

## 2022-01-12 RX ORDER — TRAZODONE HYDROCHLORIDE 150 MG/1
150 TABLET ORAL NIGHTLY
Qty: 30 TABLET | Refills: 0 | Status: SHIPPED | OUTPATIENT
Start: 2022-01-12 | End: 2022-02-09 | Stop reason: SDUPTHER

## 2022-02-09 DIAGNOSIS — G44.029 CHRONIC CLUSTER HEADACHE, NOT INTRACTABLE: ICD-10-CM

## 2022-02-09 DIAGNOSIS — Z79.899 MEDICATION MANAGEMENT: ICD-10-CM

## 2022-02-09 DIAGNOSIS — F41.9 ANXIETY: ICD-10-CM

## 2022-02-09 DIAGNOSIS — F51.01 PRIMARY INSOMNIA: ICD-10-CM

## 2022-02-10 RX ORDER — BUTALBITAL, ACETAMINOPHEN AND CAFFEINE 50; 325; 40 MG/1; MG/1; MG/1
1 TABLET ORAL EVERY 6 HOURS PRN
Qty: 60 TABLET | Refills: 0 | Status: SHIPPED | OUTPATIENT
Start: 2022-02-10 | End: 2022-04-21 | Stop reason: SDUPTHER

## 2022-02-10 RX ORDER — TRAZODONE HYDROCHLORIDE 150 MG/1
150 TABLET ORAL NIGHTLY
Qty: 30 TABLET | Refills: 0 | Status: SHIPPED | OUTPATIENT
Start: 2022-02-10 | End: 2022-03-08 | Stop reason: SDUPTHER

## 2022-02-10 RX ORDER — ALPRAZOLAM 1 MG/1
1 TABLET ORAL 2 TIMES DAILY PRN
Qty: 60 TABLET | Refills: 0 | Status: SHIPPED | OUTPATIENT
Start: 2022-02-10 | End: 2022-03-08 | Stop reason: SDUPTHER

## 2022-03-01 ENCOUNTER — TELEPHONE (OUTPATIENT)
Dept: PRIMARY CARE CLINIC | Age: 47
End: 2022-03-01

## 2022-03-01 RX ORDER — TIZANIDINE 4 MG/1
4 TABLET ORAL 3 TIMES DAILY PRN
Qty: 30 TABLET | Refills: 0 | Status: SHIPPED | OUTPATIENT
Start: 2022-03-01 | End: 2022-04-05 | Stop reason: ALTCHOICE

## 2022-03-01 NOTE — TELEPHONE ENCOUNTER
Pt c/o back pain. 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 Asking if you would send in a muscle relaxer to [facility]?

## 2022-03-08 DIAGNOSIS — F41.9 ANXIETY: ICD-10-CM

## 2022-03-08 DIAGNOSIS — Z79.899 MEDICATION MANAGEMENT: ICD-10-CM

## 2022-03-08 DIAGNOSIS — F51.01 PRIMARY INSOMNIA: ICD-10-CM

## 2022-03-09 RX ORDER — TRAZODONE HYDROCHLORIDE 150 MG/1
150 TABLET ORAL NIGHTLY
Qty: 30 TABLET | Refills: 0 | Status: SHIPPED | OUTPATIENT
Start: 2022-03-09 | End: 2022-04-05 | Stop reason: SDUPTHER

## 2022-03-09 RX ORDER — ALPRAZOLAM 1 MG/1
1 TABLET ORAL 2 TIMES DAILY PRN
Qty: 60 TABLET | Refills: 0 | Status: SHIPPED | OUTPATIENT
Start: 2022-03-09 | End: 2022-04-05 | Stop reason: SDUPTHER

## 2022-04-05 ENCOUNTER — OFFICE VISIT (OUTPATIENT)
Dept: PRIMARY CARE CLINIC | Age: 47
End: 2022-04-05
Payer: COMMERCIAL

## 2022-04-05 VITALS
SYSTOLIC BLOOD PRESSURE: 124 MMHG | WEIGHT: 130.9 LBS | OXYGEN SATURATION: 98 % | BODY MASS INDEX: 21.13 KG/M2 | DIASTOLIC BLOOD PRESSURE: 70 MMHG | HEART RATE: 78 BPM

## 2022-04-05 DIAGNOSIS — Z79.899 MEDICATION MANAGEMENT: ICD-10-CM

## 2022-04-05 DIAGNOSIS — F51.01 PRIMARY INSOMNIA: ICD-10-CM

## 2022-04-05 DIAGNOSIS — H93.8X3 CONGESTION OF BOTH EARS: Primary | ICD-10-CM

## 2022-04-05 DIAGNOSIS — M62.838 NECK MUSCLE SPASM: ICD-10-CM

## 2022-04-05 DIAGNOSIS — H93.13 TINNITUS OF BOTH EARS: ICD-10-CM

## 2022-04-05 DIAGNOSIS — F41.9 ANXIETY: ICD-10-CM

## 2022-04-05 DIAGNOSIS — Z79.899 HIGH RISK MEDICATION USE: ICD-10-CM

## 2022-04-05 LAB
ALCOHOL URINE: NEGATIVE
AMPHETAMINE SCREEN, URINE: NEGATIVE
BARBITURATE SCREEN, URINE: NEGATIVE
BENZODIAZEPINE SCREEN, URINE: POSITIVE
BUPRENORPHINE URINE: NEGATIVE
COCAINE METABOLITE SCREEN URINE: NEGATIVE
FENTANYL SCREEN, URINE: NEGATIVE
GABAPENTIN SCREEN, URINE: NEGATIVE
MDMA URINE: NEGATIVE
METHADONE SCREEN, URINE: NEGATIVE
METHAMPHETAMINE, URINE: NEGATIVE
OPIATE SCREEN URINE: NEGATIVE
OXYCODONE SCREEN URINE: NEGATIVE
PHENCYCLIDINE SCREEN URINE: NEGATIVE
PROPOXYPHENE SCREEN, URINE: NEGATIVE
SYNTHETIC CANNABINOIDS(K2) SCREEN, URINE: NEGATIVE
THC SCREEN, URINE: NEGATIVE
TRAMADOL SCREEN URINE: NEGATIVE
TRICYCLIC ANTIDEPRESSANTS, UR: NEGATIVE

## 2022-04-05 PROCEDURE — 4004F PT TOBACCO SCREEN RCVD TLK: CPT | Performed by: NURSE PRACTITIONER

## 2022-04-05 PROCEDURE — G8420 CALC BMI NORM PARAMETERS: HCPCS | Performed by: NURSE PRACTITIONER

## 2022-04-05 PROCEDURE — G8427 DOCREV CUR MEDS BY ELIG CLIN: HCPCS | Performed by: NURSE PRACTITIONER

## 2022-04-05 PROCEDURE — 99214 OFFICE O/P EST MOD 30 MIN: CPT | Performed by: NURSE PRACTITIONER

## 2022-04-05 PROCEDURE — 80305 DRUG TEST PRSMV DIR OPT OBS: CPT | Performed by: NURSE PRACTITIONER

## 2022-04-05 RX ORDER — TRAZODONE HYDROCHLORIDE 150 MG/1
150 TABLET ORAL NIGHTLY
Qty: 30 TABLET | Refills: 0 | Status: SHIPPED | OUTPATIENT
Start: 2022-04-05 | End: 2022-05-01 | Stop reason: SDUPTHER

## 2022-04-05 RX ORDER — PREDNISONE 20 MG/1
20 TABLET ORAL DAILY
Qty: 5 TABLET | Refills: 0 | Status: SHIPPED | OUTPATIENT
Start: 2022-04-05 | End: 2022-04-10

## 2022-04-05 RX ORDER — CYCLOBENZAPRINE HCL 10 MG
10 TABLET ORAL 2 TIMES DAILY PRN
Qty: 30 TABLET | Refills: 0 | Status: SHIPPED | OUTPATIENT
Start: 2022-04-05 | End: 2022-04-20

## 2022-04-05 RX ORDER — LORATADINE 10 MG/1
10 TABLET ORAL DAILY
Qty: 30 TABLET | Refills: 0 | Status: SHIPPED | OUTPATIENT
Start: 2022-04-05

## 2022-04-05 RX ORDER — ALPRAZOLAM 1 MG/1
1 TABLET ORAL 2 TIMES DAILY PRN
Qty: 60 TABLET | Refills: 0 | Status: SHIPPED | OUTPATIENT
Start: 2022-04-05 | End: 2022-05-01 | Stop reason: SDUPTHER

## 2022-04-05 ASSESSMENT — ENCOUNTER SYMPTOMS
COUGH: 0
SINUS PAIN: 0
DIARRHEA: 0
BACK PAIN: 0
SHORTNESS OF BREATH: 0
NAUSEA: 0
CONSTIPATION: 0

## 2022-04-05 NOTE — PATIENT INSTRUCTIONS
Loratadine 10 mg daily for ear congestion  Prednisone 20 mg daily X 5 days  Cyclobenzaprine 10 mg 2x/day as needed for muscle spasm  Continue alprazolam and trazodone

## 2022-04-05 NOTE — PROGRESS NOTES
[address] [address] PRIMARY CARE  [facility] [address]  [facility] [address] [place] [address]  Dept: [phone]    Oswaldo Birmingham is a 50 y.o. female Established patient, who presents today for her medical conditions/complaints as noted below. Chief Complaint   Patient presents with    Medication Check       HPI:     HPI    of a drug overdose on . She states she cries everyday. He new him since the 6th grade  She did not eat good for awhile - all she was eating was [employer] and protein shakes. Appetite is slowly coming back. Weight is down. She is sleeping OK with xanax and trazodone. She is using xanax 2x/day  Trazodone every night. Has a headache frequently. She woke up Friday with dizziness. When she tilts head back or moves to side she gets dizzy. No street drugs  She is smoking 1 ppd. States she would never hurt herself. She is the only parent her kids have now. She moving to [place] to be with her youngest daughter.     Reviewed prior notes None  Reviewed previous      No results found for: LDLCHOLESTEROL, LDLCALC    (goal LDL is <100)   AST (U/L)   Date Value   2021 16     ALT (U/L)   Date Value   2021 22     BUN (mg/dL)   Date Value   2021 18     BP Readings from Last 3 Encounters:   22 124/70   01/10/22 138/86   10/11/21 116/64          (goal 120/80)    Past Medical History:   Diagnosis Date    Anxiety     Endometriosis     Fibroid tumor 2017    Ovarian cyst     Wears dentures     uppers      Past Surgical History:   Procedure Laterality Date    BREAST SURGERY      lumpectomy of the L     HYSTERECTOMY      cervix was taken    LAPAROSCOPIC APPENDECTOMY N/A 2020    APPENDECTOMY, LAPAROSCOPIC performed by Radha Bates MD at [address] [place] [address] 2020    OPERATIVE LAPAROSCOPY WITH RIGHT ADNEXECTOMY performed by Sneha Mayo DO at [address], ureteral Bilateral 2020    URETERAL CATHETER INSERTION performed by Whitney Alvarado MD at [address] EXTRACTION         Family History   Problem Relation Age of Onset    Colon Cancer Paternal Grandfather        Social History     Tobacco Use    Smoking status: Current Every Day Smoker     Packs/day: 0.75     Years: 20.00     Pack years: 15.00     Types: Cigarettes    Smokeless tobacco: Never Used   Substance Use Topics    Alcohol use: No     Comment: social      Current Outpatient Medications   Medication Sig Dispense Refill    predniSONE (DELTASONE) 20 MG tablet Take 1 tablet by mouth daily for 5 days 5 tablet 0    loratadine (CLARITIN) 10 MG tablet Take 1 tablet by mouth daily 30 tablet 0    ALPRAZolam (XANAX) 1 MG tablet Take 1 tablet by mouth 2 times daily as needed for Sleep or Anxiety for up to 30 days. 60 tablet 0    traZODone (DESYREL) 150 MG tablet Take 1 tablet by mouth nightly 30 tablet 0    cyclobenzaprine (FLEXERIL) 10 MG tablet Take 1 tablet by mouth 2 times daily as needed for Muscle spasms 30 tablet 0    butalbital-acetaminophen-caffeine (FIORICET, ESGIC) -40 MG per tablet Take 1 tablet by mouth every 6 hours as needed for Headaches 60 tablet 0    diclofenac (VOLTAREN) 50 MG EC tablet Take 1 tablet by mouth nightly as needed for Pain 60 tablet 0    fluticasone (FLONASE) 50 MCG/ACT nasal spray instill 2 sprays into each nostril once daily 16 g 3    guaiFENesin (MUCINEX MAXIMUM STRENGTH) 1200 MG TB12 Take 1,200 mg by mouth 2 times daily as needed (congestion) 30 tablet 0    Multiple Vitamins-Minerals (ONE-A-DAY MENOPAUSE FORMULA PO) Take by mouth      Rhubarb (ESTROVEN COMPLETE) 4 MG TABS Take by mouth      Elastic Bandages & Supports (MEDICAL COMPRESSION STOCKINGS) MISC 2 each by Does not apply route daily Right and left knee high compression stockings. 30-40 mmHg. To be worn continuously throughout the day.  4 each 3    ibuprofen (ADVIL;MOTRIN) 800 MG tablet Take 1 tablet by mouth 3 times daily (with meals) 90 tablet 1    triamcinolone (KENALOG) 0.1 % ointment Apply topically 2 times daily 1 Tube 3    aspirin 325 MG EC tablet Take 1 tablet by mouth daily 42 tablet 0     No current facility-administered medications for this visit. Allergies   Allergen Reactions    Diphenhydramine Itching    Sleep Aid [Diphenhydramine Hcl (Sleep)]        Health Maintenance   Topic Date Due    Hepatitis C screen  Never done    HIV screen  Never done    Lipid screen  Never done    Colorectal Cancer Screen  09/04/2020    Flu vaccine (Season Ended) 09/01/2022    Depression Screen  10/11/2022    DTaP/Tdap/Td vaccine (2 - Td or Tdap) 10/09/2030    Pneumococcal 0-64 years Vaccine (2 of 2 - PPSV23) 03/27/2038    COVID-19 Vaccine  Completed    Hepatitis A vaccine  Aged Out    Hepatitis B vaccine  Aged Out    Hib vaccine  Aged Out    Meningococcal (ACWY) vaccine  Aged Out       Subjective:      Review of Systems   HENT: Positive for tinnitus. Negative for postnasal drip and sinus pain. Respiratory: Negative for cough and shortness of breath. Cardiovascular: Negative for chest pain and leg swelling. Gastrointestinal: Negative for constipation, diarrhea and nausea. Musculoskeletal: Negative for arthralgias and back pain. Skin: Negative for rash and wound. Neurological: Positive for dizziness and headaches. Psychiatric/Behavioral: Positive for dysphoric mood and sleep disturbance. The patient is nervous/anxious. Objective:     /70   Pulse 78   Wt 130 lb 14.4 oz (59.4 kg)   LMP 02/26/2018   SpO2 98%   BMI 21.13 kg/m²   Physical Exam  Vitals and nursing note reviewed. Constitutional:       General: She is not in acute distress. Appearance: She is well-developed. She is not ill-appearing. HENT:      Head: Normocephalic and atraumatic. Right Ear: External ear normal.      Left Ear: External ear normal.   Eyes:      General: No scleral icterus. Right eye: No discharge. Left eye: No discharge. Conjunctiva/sclera: Conjunctivae normal.      Pupils: Pupils are equal, round, and reactive to light. Neck:      Thyroid: No thyromegaly. Trachea: No tracheal deviation. Cardiovascular:      Rate and Rhythm: Normal rate and regular rhythm. Heart sounds: Normal heart sounds. Pulmonary:      Effort: Pulmonary effort is normal. No respiratory distress. Breath sounds: Normal breath sounds. No wheezing. Abdominal:      General: Bowel sounds are normal.      Palpations: Abdomen is soft. Lymphadenopathy:      Cervical: No cervical adenopathy. Skin:     General: Skin is warm. Findings: No rash. Neurological:      Mental Status: She is alert and oriented to person, place, and time. Coordination: Coordination normal.   Psychiatric:         Mood and Affect: Mood is depressed. Affect is labile and tearful. Behavior: Behavior normal.         Thought Content: Thought content normal.         Cognition and Memory: Cognition and memory normal.         Assessment/Plan:   1. Congestion of both ears  -     predniSONE (DELTASONE) 20 MG tablet; Take 1 tablet by mouth daily for 5 days, Disp-5 tablet, R-0Normal  -     loratadine (CLARITIN) 10 MG tablet; Take 1 tablet by mouth daily, Disp-30 tablet, R-0Normal  2. Tinnitus of both ears  -     predniSONE (DELTASONE) 20 MG tablet; Take 1 tablet by mouth daily for 5 days, Disp-5 tablet, R-0Normal  -     loratadine (CLARITIN) 10 MG tablet; Take 1 tablet by mouth daily, Disp-30 tablet, R-0Normal  3. Anxiety  -     ALPRAZolam (XANAX) 1 MG tablet; Take 1 tablet by mouth 2 times daily as needed for Sleep or Anxiety for up to 30 days. , Disp-60 tablet, R-0Normal  4. Medication management  -     ALPRAZolam (XANAX) 1 MG tablet; Take 1 tablet by mouth 2 times daily as needed for Sleep or Anxiety for up to 30 days. , Disp-60 tablet, R-0Normal  5. Primary insomnia  -     traZODone (DESYREL) 150 MG tablet;  Take 1 tablet by mouth nightly, Disp-30 tablet, R-0Normal  6. Neck muscle spasm  -     cyclobenzaprine (FLEXERIL) 10 MG tablet; Take 1 tablet by mouth 2 times daily as needed for Muscle spasms, Disp-30 tablet, R-0Normal  7. High risk medication use  -     POCT Rapid Drug Screen     Loratadine 10 mg daily for ear congestion  Prednisone 20 mg daily X 5 days  Cyclobenzaprine 10 mg 2x/day as needed for muscle spasm  Continue alprazolam and trazodone    Return in about 3 months (around 7/5/2022) for med check. Orders Placed This Encounter   Procedures    POCT Rapid Drug Screen     Orders Placed This Encounter   Medications    predniSONE (DELTASONE) 20 MG tablet     Sig: Take 1 tablet by mouth daily for 5 days     Dispense:  5 tablet     Refill:  0    loratadine (CLARITIN) 10 MG tablet     Sig: Take 1 tablet by mouth daily     Dispense:  30 tablet     Refill:  0    ALPRAZolam (XANAX) 1 MG tablet     Sig: Take 1 tablet by mouth 2 times daily as needed for Sleep or Anxiety for up to 30 days. Dispense:  60 tablet     Refill:  0    traZODone (DESYREL) 150 MG tablet     Sig: Take 1 tablet by mouth nightly     Dispense:  30 tablet     Refill:  0    cyclobenzaprine (FLEXERIL) 10 MG tablet     Sig: Take 1 tablet by mouth 2 times daily as needed for Muscle spasms     Dispense:  30 tablet     Refill:  0       Patient given educational materials - see patient instructions. Discussed use, benefit, and side effects of prescribed medications. All patient questions answered. Pt voiced understanding. Reviewed health maintenance. Instructed to continue current medications, diet and exercise. Patient agreed with treatment plan. Follow up as directed.      Electronically signed by ANITA Isidro CNP on 4/5/2022 at 11:40 AM

## 2022-04-21 DIAGNOSIS — G44.029 CHRONIC CLUSTER HEADACHE, NOT INTRACTABLE: ICD-10-CM

## 2022-04-21 RX ORDER — FLUTICASONE PROPIONATE 50 MCG
2 SPRAY, SUSPENSION (ML) NASAL DAILY
Qty: 16 G | Refills: 3 | Status: SHIPPED | OUTPATIENT
Start: 2022-04-21 | End: 2022-06-27 | Stop reason: SDUPTHER

## 2022-04-21 RX ORDER — BUTALBITAL, ACETAMINOPHEN AND CAFFEINE 50; 325; 40 MG/1; MG/1; MG/1
1 TABLET ORAL EVERY 6 HOURS PRN
Qty: 60 TABLET | Refills: 0 | Status: SHIPPED | OUTPATIENT
Start: 2022-04-21 | End: 2022-06-27 | Stop reason: SDUPTHER

## 2022-05-01 DIAGNOSIS — Z79.899 MEDICATION MANAGEMENT: ICD-10-CM

## 2022-05-01 DIAGNOSIS — F41.9 ANXIETY: ICD-10-CM

## 2022-05-01 DIAGNOSIS — F51.01 PRIMARY INSOMNIA: ICD-10-CM

## 2022-05-02 RX ORDER — ALPRAZOLAM 1 MG/1
1 TABLET ORAL 2 TIMES DAILY PRN
Qty: 60 TABLET | Refills: 0 | Status: SHIPPED | OUTPATIENT
Start: 2022-05-02 | End: 2022-05-30 | Stop reason: SDUPTHER

## 2022-05-02 RX ORDER — TRAZODONE HYDROCHLORIDE 150 MG/1
150 TABLET ORAL NIGHTLY
Qty: 30 TABLET | Refills: 0 | Status: SHIPPED | OUTPATIENT
Start: 2022-05-02 | End: 2022-05-30 | Stop reason: SDUPTHER

## 2022-05-30 DIAGNOSIS — F41.9 ANXIETY: ICD-10-CM

## 2022-05-30 DIAGNOSIS — F51.01 PRIMARY INSOMNIA: ICD-10-CM

## 2022-05-30 DIAGNOSIS — Z79.899 MEDICATION MANAGEMENT: ICD-10-CM

## 2022-05-31 RX ORDER — ALPRAZOLAM 1 MG/1
1 TABLET ORAL 2 TIMES DAILY PRN
Qty: 60 TABLET | Refills: 0 | Status: SHIPPED | OUTPATIENT
Start: 2022-05-31 | End: 2022-06-27 | Stop reason: SDUPTHER

## 2022-05-31 RX ORDER — TRAZODONE HYDROCHLORIDE 150 MG/1
150 TABLET ORAL NIGHTLY
Qty: 30 TABLET | Refills: 0 | Status: SHIPPED | OUTPATIENT
Start: 2022-05-31 | End: 2022-06-27 | Stop reason: SDUPTHER

## 2022-06-27 DIAGNOSIS — G44.029 CHRONIC CLUSTER HEADACHE, NOT INTRACTABLE: ICD-10-CM

## 2022-06-27 DIAGNOSIS — F51.01 PRIMARY INSOMNIA: ICD-10-CM

## 2022-06-27 DIAGNOSIS — Z79.899 MEDICATION MANAGEMENT: ICD-10-CM

## 2022-06-27 DIAGNOSIS — F41.9 ANXIETY: ICD-10-CM

## 2022-06-27 RX ORDER — ALPRAZOLAM 1 MG/1
1 TABLET ORAL 2 TIMES DAILY PRN
Qty: 60 TABLET | Refills: 0 | Status: SHIPPED | OUTPATIENT
Start: 2022-06-27 | End: 2022-07-26 | Stop reason: SDUPTHER

## 2022-06-27 RX ORDER — BUTALBITAL, ACETAMINOPHEN AND CAFFEINE 50; 325; 40 MG/1; MG/1; MG/1
1 TABLET ORAL EVERY 6 HOURS PRN
Qty: 60 TABLET | Refills: 0 | Status: SHIPPED | OUTPATIENT
Start: 2022-06-27 | End: 2022-06-28 | Stop reason: SDUPTHER

## 2022-06-27 RX ORDER — FLUTICASONE PROPIONATE 50 MCG
2 SPRAY, SUSPENSION (ML) NASAL DAILY
Qty: 16 G | Refills: 3 | Status: SHIPPED | OUTPATIENT
Start: 2022-06-27

## 2022-06-27 RX ORDER — TRAZODONE HYDROCHLORIDE 150 MG/1
150 TABLET ORAL NIGHTLY
Qty: 30 TABLET | Refills: 0 | Status: SHIPPED | OUTPATIENT
Start: 2022-06-27 | End: 2022-07-26 | Stop reason: SDUPTHER

## 2022-06-28 DIAGNOSIS — G44.029 CHRONIC CLUSTER HEADACHE, NOT INTRACTABLE: ICD-10-CM

## 2022-06-28 DIAGNOSIS — M54.32 LEFT SIDED SCIATICA: ICD-10-CM

## 2022-06-28 RX ORDER — CYCLOBENZAPRINE HCL 5 MG
5 TABLET ORAL 3 TIMES DAILY PRN
Qty: 20 TABLET | Refills: 0 | Status: SHIPPED | OUTPATIENT
Start: 2022-06-28 | End: 2022-07-08

## 2022-06-28 RX ORDER — BUTALBITAL, ACETAMINOPHEN AND CAFFEINE 50; 325; 40 MG/1; MG/1; MG/1
1 TABLET ORAL EVERY 6 HOURS PRN
Qty: 60 TABLET | Refills: 0 | Status: SHIPPED | OUTPATIENT
Start: 2022-06-28 | End: 2022-06-28 | Stop reason: SDUPTHER

## 2022-06-28 RX ORDER — BUTALBITAL, ACETAMINOPHEN AND CAFFEINE 50; 325; 40 MG/1; MG/1; MG/1
1 TABLET ORAL EVERY 6 HOURS PRN
Qty: 60 TABLET | Refills: 0 | Status: SHIPPED | OUTPATIENT
Start: 2022-06-28 | End: 2022-06-30 | Stop reason: SDUPTHER

## 2022-06-29 DIAGNOSIS — G44.029 CHRONIC CLUSTER HEADACHE, NOT INTRACTABLE: ICD-10-CM

## 2022-06-29 NOTE — TELEPHONE ENCOUNTER
Last 2 attempts for E-prescription did not go through - we may need to print RX and fax to [facility].

## 2022-06-30 RX ORDER — BUTALBITAL, ACETAMINOPHEN AND CAFFEINE 50; 325; 40 MG/1; MG/1; MG/1
1 TABLET ORAL EVERY 6 HOURS PRN
Qty: 60 TABLET | Refills: 0 | Status: SHIPPED | OUTPATIENT
Start: 2022-06-30

## 2022-07-26 DIAGNOSIS — Z79.899 MEDICATION MANAGEMENT: ICD-10-CM

## 2022-07-26 DIAGNOSIS — F41.9 ANXIETY: ICD-10-CM

## 2022-07-26 DIAGNOSIS — F51.01 PRIMARY INSOMNIA: ICD-10-CM

## 2022-07-26 RX ORDER — TRAZODONE HYDROCHLORIDE 150 MG/1
150 TABLET ORAL NIGHTLY
Qty: 30 TABLET | Refills: 0 | Status: SHIPPED | OUTPATIENT
Start: 2022-07-26

## 2022-07-26 RX ORDER — ALPRAZOLAM 1 MG/1
1 TABLET ORAL 2 TIMES DAILY PRN
Qty: 60 TABLET | Refills: 0 | Status: SHIPPED | OUTPATIENT
Start: 2022-07-26 | End: 2022-08-25

## 2022-08-30 DIAGNOSIS — F51.01 PRIMARY INSOMNIA: ICD-10-CM

## 2022-08-30 RX ORDER — TRAZODONE HYDROCHLORIDE 150 MG/1
150 TABLET ORAL NIGHTLY
Qty: 30 TABLET | Refills: 0 | OUTPATIENT
Start: 2022-08-30

## 2022-09-01 NOTE — TELEPHONE ENCOUNTER
Patient does have new provider filling medications. I asked her to have [facility] stop sending requests here. She will.

## (undated) DEVICE — GLOVE SURG SZ 65 THK91MIL LTX FREE SYN POLYISOPRENE

## (undated) DEVICE — SUTURE MCRYL + SZ 4-0 L27IN ABSRB UD L19MM PS-2 3/8 CIR MCP426H

## (undated) DEVICE — STRIP,CLOSURE,WOUND,MEDI-STRIP,1/2X4: Brand: MEDLINE

## (undated) DEVICE — BLANKET WRM W29.9XL79.1IN UP BODY FORC AIR MISTRAL-AIR

## (undated) DEVICE — CATHETER URET 5FR L70CM OPN END SGL LUMN INJ HUB FLEXIMA

## (undated) DEVICE — TOTAL TRAY, DB, 100% SILI FOLEY, 16FR 10: Brand: MEDLINE

## (undated) DEVICE — Z DISCONTINUED PER MEDLINE USE 2425483 TAPE UMB L30IN DIA1/8IN WHT COT NONABSORBABLE W/O NDL FOR

## (undated) DEVICE — SUTURE VCRL SZ 0 L36IN ABSRB UD CT-1 L36MM 1/2 CIR TAPR PNT VCP946H

## (undated) DEVICE — SHEET, T, LAPAROTOMY, STERILE: Brand: MEDLINE

## (undated) DEVICE — COVER,MAYO STAND,STERILE: Brand: MEDLINE

## (undated) DEVICE — SOLUTION IV IRRIG WATER 1000ML POUR BRL 2F7114

## (undated) DEVICE — SUTURE VCRL + SZ 3-0 L27IN ABSRB UD L26MM SH 1/2 CIR VCP416H

## (undated) DEVICE — CUTTER ENDOSCP L340MM LIN ARTC SGL STROKE FIRING ENDOPATH

## (undated) DEVICE — Device

## (undated) DEVICE — GOWN,AURORA,NONREINFORCED,LARGE: Brand: MEDLINE

## (undated) DEVICE — TROCAR: Brand: KII FIOS FIRST ENTRY

## (undated) DEVICE — SOLUTION IV IRRIG POUR BRL 0.9% SODIUM CHL 2F7124

## (undated) DEVICE — HYPODERMIC SAFETY NEEDLE: Brand: MAGELLAN

## (undated) DEVICE — Z DISCONTINUED GLOVE SURG SZ 7 L12IN FNGR THK13MIL WHT ISOLEX POLYISOPRENE

## (undated) DEVICE — STAPLER INT L16CM STD UNIV RELD DISP TRI-STAPLE ENDO GIA

## (undated) DEVICE — SPONGE: SPECIALTY PEANUT XR 100/CS: Brand: MEDICAL ACTION INDUSTRIES

## (undated) DEVICE — GLOVE SURG 7.5 PF POLYMER WHT STRL SIGN LTX ESSENTIAL LTX

## (undated) DEVICE — YANKAUER,BULB TIP,W/O VENT,RIGID,STERILE: Brand: MEDLINE

## (undated) DEVICE — METER,URINE,400ML,DRAIN BAG,L/F,LL: Brand: MEDLINE

## (undated) DEVICE — RELOAD STPL SZ 0 L45MM DIA3.5MM 0DEG STD REG TISS BLU TI

## (undated) DEVICE — ST CHARLES MAJOR ABDOMINAL PK: Brand: MEDLINE INDUSTRIES, INC.

## (undated) DEVICE — TOTAL TRAY, 16FR 10ML SIL FOLEY, URN: Brand: MEDLINE

## (undated) DEVICE — GUIDEWIRE URO L150CM DIA0.035IN STIFF NIT HYDRPHLC STR TIP

## (undated) DEVICE — SINGLE PORT MANIFOLD: Brand: NEPTUNE 2

## (undated) DEVICE — SYRINGE 20ML LL S/C 50

## (undated) DEVICE — TROCARS: Brand: KII® BALLOON BLUNT TIP SYSTEM

## (undated) DEVICE — INTENDED FOR TISSUE SEPARATION, AND OTHER PROCEDURES THAT REQUIRE A SHARP SURGICAL BLADE TO PUNCTURE OR CUT.: Brand: BARD-PARKER ® CARBON RIB-BACK BLADES

## (undated) DEVICE — SUTURE VCRL + SZ 0 L27IN ABSRB VLT L26MM UR-6 5/8 CIR VCP603H

## (undated) DEVICE — GLOVE SURG SZ 8 L12IN FNGR THK75MIL WHT LTX POLYMER BEAD

## (undated) DEVICE — GOWN,SIRUS,NONRNF,SETINSLV,XL,20/CS: Brand: MEDLINE

## (undated) DEVICE — MERCY HEALTH ST CHARLES: Brand: MEDLINE INDUSTRIES, INC.

## (undated) DEVICE — DRAPE IRRIG FLD WRM W44XL44IN W/ AORN STD PRTBL INTRATEMP

## (undated) DEVICE — SCISSOR SURG CRV ENDOCUT TIP FOR LAP DISP

## (undated) DEVICE — INSUFFLATION TUBING SET WITH FILTER, FUNNEL CONNECTOR AND LUER LOCK: Brand: JOSNOE MEDICAL INC

## (undated) DEVICE — SUTURE PDS II SZ 0 L60IN ABSRB VLT L48MM CTX 1/2 CIR Z990G

## (undated) DEVICE — SUTURE ENDOLOOP SZ 0 L18IN ABSRB VLT LIG SLDE KNOT VCRL

## (undated) DEVICE — BLADE ES L6IN ELASTOMERIC COAT EXT DURABLE BEND UPTO 90DEG

## (undated) DEVICE — RELOAD STPL H1-2.5X45MM VASC THN TISS WHT 6 ROW B FRM SGL

## (undated) DEVICE — TROCAR: Brand: KII® SLEEVE

## (undated) DEVICE — CATHETER IV 14GA L1.25IN TEF STR HUB INTROCAN SFTY

## (undated) DEVICE — Z DUP USE 2522782 SOLUTION IRRIG 1000ML STRL H2O PLAS CONTAINER UROMATIC

## (undated) DEVICE — BAG SPEC REM 224ML W4XL6IN DIA10MM 1 HND GYN DISP ENDOPCH

## (undated) DEVICE — ST CHARLES CYSTO PACK: Brand: MEDLINE INDUSTRIES, INC.

## (undated) DEVICE — CYSTO/BLADDER IRRIGATION SET, REGULATING CLAMP

## (undated) DEVICE — DRESSING TRNSPAR W2XL2.75IN FLM SHT SEMIPERMEABLE WIND

## (undated) DEVICE — GLOVE SURG SZ 75 L12IN FNGR THK79MIL GRN LTX FREE

## (undated) DEVICE — CATHETER URETH 16FR BLLN 5CC SIL ALLY W/ SIL HYDRGEL 2 W F